# Patient Record
Sex: MALE | Race: WHITE | NOT HISPANIC OR LATINO | Employment: OTHER | URBAN - METROPOLITAN AREA
[De-identification: names, ages, dates, MRNs, and addresses within clinical notes are randomized per-mention and may not be internally consistent; named-entity substitution may affect disease eponyms.]

---

## 2017-06-14 ENCOUNTER — ALLSCRIPTS OFFICE VISIT (OUTPATIENT)
Dept: OTHER | Facility: OTHER | Age: 82
End: 2017-06-14

## 2018-01-14 VITALS
DIASTOLIC BLOOD PRESSURE: 66 MMHG | SYSTOLIC BLOOD PRESSURE: 110 MMHG | HEART RATE: 80 BPM | HEIGHT: 69 IN | WEIGHT: 231.5 LBS | BODY MASS INDEX: 34.29 KG/M2

## 2018-02-11 ENCOUNTER — APPOINTMENT (EMERGENCY)
Dept: RADIOLOGY | Facility: HOSPITAL | Age: 83
DRG: 682 | End: 2018-02-11
Payer: MEDICARE

## 2018-02-11 ENCOUNTER — HOSPITAL ENCOUNTER (INPATIENT)
Facility: HOSPITAL | Age: 83
LOS: 7 days | Discharge: HOME HEALTH | DRG: 682 | End: 2018-02-18
Attending: EMERGENCY MEDICINE | Admitting: HOSPITALIST
Payer: MEDICARE

## 2018-02-11 DIAGNOSIS — R21 RASH AND NONSPECIFIC SKIN ERUPTION: ICD-10-CM

## 2018-02-11 DIAGNOSIS — F32.A DEPRESSION, UNSPECIFIED DEPRESSION TYPE: ICD-10-CM

## 2018-02-11 DIAGNOSIS — I48.91 ATRIAL FIBRILLATION (HCC): Primary | ICD-10-CM

## 2018-02-11 DIAGNOSIS — R13.10 DYSPHAGIA: ICD-10-CM

## 2018-02-11 DIAGNOSIS — R13.10 DYSPHAGIA, UNSPECIFIED TYPE: ICD-10-CM

## 2018-02-11 DIAGNOSIS — I95.9 HYPOTENSION: ICD-10-CM

## 2018-02-11 DIAGNOSIS — I50.22 CHRONIC SYSTOLIC CHF (CONGESTIVE HEART FAILURE) (HCC): ICD-10-CM

## 2018-02-11 DIAGNOSIS — R21 RASH: ICD-10-CM

## 2018-02-11 DIAGNOSIS — N17.9 AKI (ACUTE KIDNEY INJURY) (HCC): ICD-10-CM

## 2018-02-11 DIAGNOSIS — T18.128A FOOD IMPACTION OF ESOPHAGUS, INITIAL ENCOUNTER: ICD-10-CM

## 2018-02-11 PROBLEM — R07.9 CHEST PAIN: Status: ACTIVE | Noted: 2018-02-11

## 2018-02-11 PROBLEM — C44.709: Status: ACTIVE | Noted: 2018-02-11

## 2018-02-11 PROBLEM — R17 SERUM TOTAL BILIRUBIN ELEVATED: Status: ACTIVE | Noted: 2018-02-11

## 2018-02-11 LAB
ALBUMIN SERPL BCP-MCNC: 3.6 G/DL (ref 3.5–5)
ALP SERPL-CCNC: 46 U/L (ref 46–116)
ALT SERPL W P-5'-P-CCNC: 34 U/L (ref 12–78)
ANION GAP SERPL CALCULATED.3IONS-SCNC: 13 MMOL/L (ref 4–13)
APTT PPP: 34 SECONDS (ref 23–35)
AST SERPL W P-5'-P-CCNC: 39 U/L (ref 5–45)
BASOPHILS # BLD AUTO: 0.02 THOUSANDS/ΜL (ref 0–0.1)
BASOPHILS NFR BLD AUTO: 0 % (ref 0–1)
BILIRUB SERPL-MCNC: 1.2 MG/DL (ref 0.2–1)
BUN SERPL-MCNC: 39 MG/DL (ref 5–25)
CALCIUM SERPL-MCNC: 9.3 MG/DL (ref 8.3–10.1)
CHLORIDE SERPL-SCNC: 94 MMOL/L (ref 100–108)
CO2 SERPL-SCNC: 29 MMOL/L (ref 21–32)
CREAT SERPL-MCNC: 3.78 MG/DL (ref 0.6–1.3)
DIGOXIN SERPL-MCNC: 1.1 NG/ML (ref 0.8–2)
EOSINOPHIL # BLD AUTO: 0.03 THOUSAND/ΜL (ref 0–0.61)
EOSINOPHIL NFR BLD AUTO: 0 % (ref 0–6)
ERYTHROCYTE [DISTWIDTH] IN BLOOD BY AUTOMATED COUNT: 14 % (ref 11.6–15.1)
GFR SERPL CREATININE-BSD FRML MDRD: 14 ML/MIN/1.73SQ M
GLUCOSE SERPL-MCNC: 111 MG/DL (ref 65–140)
HCT VFR BLD AUTO: 48.3 % (ref 36.5–49.3)
HGB BLD-MCNC: 15.7 G/DL (ref 12–17)
INR PPP: 1.28 (ref 0.86–1.16)
LYMPHOCYTES # BLD AUTO: 1.34 THOUSANDS/ΜL (ref 0.6–4.47)
LYMPHOCYTES NFR BLD AUTO: 13 % (ref 14–44)
MCH RBC QN AUTO: 30.8 PG (ref 26.8–34.3)
MCHC RBC AUTO-ENTMCNC: 32.5 G/DL (ref 31.4–37.4)
MCV RBC AUTO: 95 FL (ref 82–98)
MONOCYTES # BLD AUTO: 0.57 THOUSAND/ΜL (ref 0.17–1.22)
MONOCYTES NFR BLD AUTO: 6 % (ref 4–12)
NEUTROPHILS # BLD AUTO: 8.17 THOUSANDS/ΜL (ref 1.85–7.62)
NEUTS SEG NFR BLD AUTO: 81 % (ref 43–75)
NT-PROBNP SERPL-MCNC: 760 PG/ML
PLATELET # BLD AUTO: 292 THOUSANDS/UL (ref 149–390)
PMV BLD AUTO: 10.4 FL (ref 8.9–12.7)
POTASSIUM SERPL-SCNC: 4.5 MMOL/L (ref 3.5–5.3)
PROT SERPL-MCNC: 7.2 G/DL (ref 6.4–8.2)
PROTHROMBIN TIME: 16.4 SECONDS (ref 12.1–14.4)
RBC # BLD AUTO: 5.1 MILLION/UL (ref 3.88–5.62)
SODIUM SERPL-SCNC: 136 MMOL/L (ref 136–145)
TROPONIN I SERPL-MCNC: 0.05 NG/ML
TROPONIN I SERPL-MCNC: 0.06 NG/ML
WBC # BLD AUTO: 10.13 THOUSAND/UL (ref 4.31–10.16)

## 2018-02-11 PROCEDURE — 84484 ASSAY OF TROPONIN QUANT: CPT | Performed by: PHYSICIAN ASSISTANT

## 2018-02-11 PROCEDURE — 96360 HYDRATION IV INFUSION INIT: CPT

## 2018-02-11 PROCEDURE — 36415 COLL VENOUS BLD VENIPUNCTURE: CPT | Performed by: PHYSICIAN ASSISTANT

## 2018-02-11 PROCEDURE — 80162 ASSAY OF DIGOXIN TOTAL: CPT | Performed by: PHYSICIAN ASSISTANT

## 2018-02-11 PROCEDURE — 80053 COMPREHEN METABOLIC PANEL: CPT | Performed by: PHYSICIAN ASSISTANT

## 2018-02-11 PROCEDURE — 93005 ELECTROCARDIOGRAM TRACING: CPT

## 2018-02-11 PROCEDURE — 83880 ASSAY OF NATRIURETIC PEPTIDE: CPT | Performed by: PHYSICIAN ASSISTANT

## 2018-02-11 PROCEDURE — 71045 X-RAY EXAM CHEST 1 VIEW: CPT

## 2018-02-11 PROCEDURE — 96375 TX/PRO/DX INJ NEW DRUG ADDON: CPT

## 2018-02-11 PROCEDURE — 85025 COMPLETE CBC W/AUTO DIFF WBC: CPT | Performed by: PHYSICIAN ASSISTANT

## 2018-02-11 PROCEDURE — 85610 PROTHROMBIN TIME: CPT | Performed by: PHYSICIAN ASSISTANT

## 2018-02-11 PROCEDURE — 96366 THER/PROPH/DIAG IV INF ADDON: CPT

## 2018-02-11 PROCEDURE — 99285 EMERGENCY DEPT VISIT HI MDM: CPT

## 2018-02-11 PROCEDURE — 85730 THROMBOPLASTIN TIME PARTIAL: CPT | Performed by: PHYSICIAN ASSISTANT

## 2018-02-11 PROCEDURE — 96365 THER/PROPH/DIAG IV INF INIT: CPT

## 2018-02-11 PROCEDURE — 99223 1ST HOSP IP/OBS HIGH 75: CPT | Performed by: PHYSICIAN ASSISTANT

## 2018-02-11 RX ORDER — SODIUM CHLORIDE 9 MG/ML
125 INJECTION, SOLUTION INTRAVENOUS CONTINUOUS
Status: DISCONTINUED | OUTPATIENT
Start: 2018-02-11 | End: 2018-02-11

## 2018-02-11 RX ORDER — LORATADINE 10 MG/1
10 TABLET ORAL DAILY
Status: DISCONTINUED | OUTPATIENT
Start: 2018-02-12 | End: 2018-02-18 | Stop reason: HOSPADM

## 2018-02-11 RX ORDER — ALBUTEROL SULFATE 90 UG/1
2 AEROSOL, METERED RESPIRATORY (INHALATION) EVERY 4 HOURS PRN
Status: DISCONTINUED | OUTPATIENT
Start: 2018-02-11 | End: 2018-02-18 | Stop reason: HOSPADM

## 2018-02-11 RX ORDER — LORATADINE 10 MG/1
TABLET ORAL DAILY PRN
COMMUNITY

## 2018-02-11 RX ORDER — DILTIAZEM HYDROCHLORIDE 5 MG/ML
INJECTION INTRAVENOUS
Status: DISCONTINUED
Start: 2018-02-11 | End: 2018-02-11 | Stop reason: WASHOUT

## 2018-02-11 RX ORDER — METOPROLOL TARTRATE 50 MG/1
50 TABLET, FILM COATED ORAL EVERY 12 HOURS SCHEDULED
Status: ON HOLD | COMMUNITY
End: 2018-02-18

## 2018-02-11 RX ORDER — DIPHENHYDRAMINE HYDROCHLORIDE 50 MG/ML
25 INJECTION INTRAMUSCULAR; INTRAVENOUS ONCE
Status: COMPLETED | OUTPATIENT
Start: 2018-02-11 | End: 2018-02-11

## 2018-02-11 RX ORDER — ACETAMINOPHEN 325 MG/1
650 TABLET ORAL EVERY 4 HOURS PRN
Status: DISCONTINUED | OUTPATIENT
Start: 2018-02-11 | End: 2018-02-18 | Stop reason: HOSPADM

## 2018-02-11 RX ORDER — METOPROLOL SUCCINATE 50 MG/1
1 TABLET, EXTENDED RELEASE ORAL 2 TIMES DAILY
COMMUNITY
Start: 2015-06-25 | End: 2018-02-11

## 2018-02-11 RX ORDER — CITALOPRAM 20 MG/1
10 TABLET ORAL DAILY
Status: DISCONTINUED | OUTPATIENT
Start: 2018-02-12 | End: 2018-02-18

## 2018-02-11 RX ORDER — FUROSEMIDE 40 MG/1
TABLET ORAL 2 TIMES DAILY
COMMUNITY
Start: 2017-05-02 | End: 2018-02-18 | Stop reason: HOSPADM

## 2018-02-11 RX ORDER — DIGOXIN 125 MCG
1 TABLET ORAL DAILY
COMMUNITY
Start: 2015-07-02 | End: 2018-02-18 | Stop reason: HOSPADM

## 2018-02-11 RX ORDER — ASPIRIN 81 MG/1
81 TABLET ORAL DAILY
COMMUNITY

## 2018-02-11 RX ORDER — METOPROLOL TARTRATE 5 MG/5ML
5 INJECTION INTRAVENOUS ONCE
Status: COMPLETED | OUTPATIENT
Start: 2018-02-11 | End: 2018-02-11

## 2018-02-11 RX ORDER — ALBUTEROL SULFATE 90 UG/1
AEROSOL, METERED RESPIRATORY (INHALATION)
COMMUNITY
Start: 2015-04-14 | End: 2018-02-26 | Stop reason: ALTCHOICE

## 2018-02-11 RX ORDER — LISINOPRIL 2.5 MG/1
TABLET ORAL
COMMUNITY
Start: 2015-06-17 | End: 2018-02-18 | Stop reason: HOSPADM

## 2018-02-11 RX ORDER — SODIUM CHLORIDE 9 MG/ML
75 INJECTION, SOLUTION INTRAVENOUS CONTINUOUS
Status: CANCELLED | OUTPATIENT
Start: 2018-02-11

## 2018-02-11 RX ORDER — ONDANSETRON 2 MG/ML
4 INJECTION INTRAMUSCULAR; INTRAVENOUS EVERY 6 HOURS PRN
Status: DISCONTINUED | OUTPATIENT
Start: 2018-02-11 | End: 2018-02-18 | Stop reason: HOSPADM

## 2018-02-11 RX ORDER — ASPIRIN 81 MG/1
81 TABLET ORAL DAILY
Status: DISCONTINUED | OUTPATIENT
Start: 2018-02-12 | End: 2018-02-18 | Stop reason: HOSPADM

## 2018-02-11 RX ORDER — SODIUM CHLORIDE 9 MG/ML
125 INJECTION, SOLUTION INTRAVENOUS CONTINUOUS
Status: CANCELLED | OUTPATIENT
Start: 2018-02-11

## 2018-02-11 RX ORDER — CALCIUM CARBONATE 200(500)MG
1000 TABLET,CHEWABLE ORAL DAILY PRN
Status: DISCONTINUED | OUTPATIENT
Start: 2018-02-11 | End: 2018-02-18 | Stop reason: HOSPADM

## 2018-02-11 RX ORDER — METOPROLOL TARTRATE 50 MG/1
50 TABLET, FILM COATED ORAL EVERY 12 HOURS SCHEDULED
Status: DISCONTINUED | OUTPATIENT
Start: 2018-02-11 | End: 2018-02-12

## 2018-02-11 RX ORDER — CITALOPRAM 10 MG/1
TABLET ORAL
COMMUNITY
Start: 2015-07-15 | End: 2018-02-18 | Stop reason: HOSPADM

## 2018-02-11 RX ORDER — DIGOXIN 125 MCG
125 TABLET ORAL DAILY
Status: DISCONTINUED | OUTPATIENT
Start: 2018-02-12 | End: 2018-02-12

## 2018-02-11 RX ORDER — OXYBUTYNIN CHLORIDE 5 MG/1
1 TABLET ORAL 2 TIMES DAILY
COMMUNITY
End: 2019-05-02

## 2018-02-11 RX ORDER — OXYBUTYNIN CHLORIDE 5 MG/1
5 TABLET ORAL 2 TIMES DAILY
Status: DISCONTINUED | OUTPATIENT
Start: 2018-02-12 | End: 2018-02-18 | Stop reason: HOSPADM

## 2018-02-11 RX ORDER — FUROSEMIDE 10 MG/ML
40 INJECTION INTRAMUSCULAR; INTRAVENOUS ONCE
Status: COMPLETED | OUTPATIENT
Start: 2018-02-11 | End: 2018-02-11

## 2018-02-11 RX ORDER — SENNOSIDES 8.6 MG
1 TABLET ORAL DAILY
Status: DISCONTINUED | OUTPATIENT
Start: 2018-02-12 | End: 2018-02-18 | Stop reason: HOSPADM

## 2018-02-11 RX ADMIN — FUROSEMIDE 40 MG: 10 INJECTION, SOLUTION INTRAMUSCULAR; INTRAVENOUS at 23:35

## 2018-02-11 RX ADMIN — DIPHENHYDRAMINE HYDROCHLORIDE 25 MG: 50 INJECTION, SOLUTION INTRAMUSCULAR; INTRAVENOUS at 19:11

## 2018-02-11 RX ADMIN — SODIUM CHLORIDE 125 ML/HR: 0.9 INJECTION, SOLUTION INTRAVENOUS at 19:50

## 2018-02-11 RX ADMIN — METOPROLOL TARTRATE 5 MG: 1 INJECTION, SOLUTION INTRAVENOUS at 18:00

## 2018-02-11 RX ADMIN — DILTIAZEM HYDROCHLORIDE 5 MG/HR: 5 INJECTION INTRAVENOUS at 18:30

## 2018-02-11 NOTE — ED PROVIDER NOTES
History  Chief Complaint   Patient presents with    Rash     pts daughter reports pt was vomiting today and "itchy all over " pt has generalized rash  states c/p last night  pt reports generalized body aches  denies any fevers  80-year-old male with past medical history of atrial fibrillation (on metoprolol and Eliquis), hypertension, congestive heart failure, presents to emergency department for chest pain and rash  Patient states that the chest discomfort started in his mid substernal chest yesterday while he was at rest, subsiding after a few minutes  Denies any palpitations, shortness of breath, coughing, increased leg swelling  Denies any current chest pain  Denies fevers, chills, nausea, vomiting, abdominal pain  Also developed a rash to the bilateral upper extremities and anterior and posterior trunk  Endorses hives to the anterior trunk that are itchy  Did not take anything for this prior to arrival  Denies new detergents, foods, lotions, environments, medications  History provided by:  Patient and relative   used: No    Rash   Associated symptoms: no abdominal pain, no diarrhea, no fatigue, no fever, no headaches, no joint pain, no myalgias, no nausea, no shortness of breath, no sore throat, not vomiting and not wheezing        Prior to Admission Medications   Prescriptions Last Dose Informant Patient Reported? Taking?    NON FORMULARY   Yes Yes   albuterol (PROAIR HFA) 90 mcg/act inhaler   Yes Yes   Sig: Inhale   apixaban (ELIQUIS) 5 mg   Yes Yes   Sig: Take 1 tablet by mouth 2 (two) times a day   aspirin (ECOTRIN LOW STRENGTH) 81 mg EC tablet   Yes Yes   Sig: Take by mouth   citalopram (CeleXA) 10 mg tablet   Yes Yes   Sig: Take by mouth   digoxin (LANOXIN) 0 125 mg tablet   Yes Yes   Sig: Take 1 tablet by mouth daily   furosemide (LASIX) 40 mg tablet   Yes Yes   Sig: Take by mouth 2 (two) times a day     lisinopril (ZESTRIL) 2 5 mg tablet   Yes Yes   Sig: Take by mouth   loratadine (CLARITIN) 10 mg tablet   Yes Yes   Sig: Take by mouth   metoprolol tartrate (LOPRESSOR) 50 mg tablet   Yes Yes   Sig: Take 50 mg by mouth every 12 (twelve) hours   oxybutynin (DITROPAN) 5 mg tablet   Yes Yes   Sig: Take 1 tablet by mouth 2 (two) times a day      Facility-Administered Medications: None       Past Medical History:   Diagnosis Date    Atrial fibrillation (HCC)     Hypertension     Macular degeneration        History reviewed  No pertinent surgical history  History reviewed  No pertinent family history  I have reviewed and agree with the history as documented  Social History   Substance Use Topics    Smoking status: Never Smoker    Smokeless tobacco: Not on file    Alcohol use No        Review of Systems   Constitutional: Negative for chills, diaphoresis, fatigue and fever  HENT: Negative for congestion, ear pain, rhinorrhea and sore throat  Respiratory: Positive for chest tightness  Negative for cough, choking, shortness of breath, wheezing and stridor  Cardiovascular: Positive for chest pain  Negative for palpitations and leg swelling  Gastrointestinal: Negative for abdominal pain, diarrhea, nausea and vomiting  Genitourinary: Negative for difficulty urinating, dysuria, flank pain, frequency and urgency  Musculoskeletal: Negative for arthralgias, back pain, gait problem, joint swelling, myalgias, neck pain and neck stiffness  Skin: Positive for rash  Negative for color change, pallor and wound  Neurological: Negative for dizziness, weakness, light-headedness, numbness and headaches         Physical Exam  ED Triage Vitals   Temperature Pulse Respirations Blood Pressure SpO2   02/11/18 2243 02/11/18 1728 02/11/18 1728 02/11/18 1728 02/11/18 1823   98 4 °F (36 9 °C) (!) 149 16 125/73 95 %      Temp Source Heart Rate Source Patient Position - Orthostatic VS BP Location FiO2 (%)   02/11/18 1728 02/11/18 1728 02/11/18 1823 02/11/18 1823 --   Oral Monitor Lying Left arm       Pain Score       02/11/18 1728       No Pain           Orthostatic Vital Signs  Vitals:    02/11/18 2047 02/11/18 2100 02/11/18 2145 02/11/18 2243   BP: (!) 108/44 109/54 111/51 120/78   Pulse: (!) 115 (!) 116 98 75   Patient Position - Orthostatic VS: Sitting Sitting Sitting Lying       Physical Exam   Constitutional: He is oriented to person, place, and time  He appears well-developed and well-nourished  Eyes: Conjunctivae and EOM are normal  Pupils are equal, round, and reactive to light  Neck: Normal range of motion  Neck supple  Cardiovascular: Intact distal pulses  Irregularly irregular, tachycardia  Pulmonary/Chest: Effort normal and breath sounds normal  He has no wheezes  He has no rales  Abdominal: Soft  Bowel sounds are normal  He exhibits no distension  There is no tenderness  There is no rebound and no guarding  Musculoskeletal: Normal range of motion  He exhibits no edema or tenderness  Neurological: He is alert and oriented to person, place, and time  No cranial nerve deficit or sensory deficit  He exhibits normal muscle tone  Coordination normal    Skin: Skin is warm and dry  Capillary refill takes less than 2 seconds  Rash (Diffuse to bilateral upper extremities and anterior trunk and posterior trunk  Erythematous hives  Dry skin with overlying excoriations ) noted  Psychiatric: He has a normal mood and affect  His behavior is normal    Nursing note and vitals reviewed        ED Medications  Medications   diltiazem (CARDIZEM) 125 mg in sodium chloride 0 9 % 125 mL infusion (5 mg/hr Intravenous Rate/Dose Change 2/11/18 2300)   aspirin (ECOTRIN LOW STRENGTH) EC tablet 81 mg (not administered)   citalopram (CeleXA) tablet 10 mg (not administered)   digoxin (LANOXIN) tablet 125 mcg (not administered)   apixaban (ELIQUIS) tablet 2 5 mg (not administered)   loratadine (CLARITIN) tablet 10 mg (not administered)   oxybutynin (DITROPAN) tablet 5 mg (not administered) albuterol (PROVENTIL HFA,VENTOLIN HFA) inhaler 2 puff (not administered)   metoprolol tartrate (LOPRESSOR) tablet 50 mg (not administered)   senna (SENOKOT) tablet 8 6 mg (not administered)   ondansetron (ZOFRAN) injection 4 mg (not administered)   calcium carbonate (TUMS) chewable tablet 1,000 mg (not administered)   acetaminophen (TYLENOL) tablet 650 mg (not administered)   furosemide (LASIX) injection 40 mg (not administered)   metoprolol (LOPRESSOR) injection 5 mg (5 mg Intravenous Given 2/11/18 1800)   diphenhydrAMINE (BENADRYL) injection 25 mg (25 mg Intravenous Given 2/11/18 1911)       Diagnostic Studies  Results Reviewed     Procedure Component Value Units Date/Time    Digoxin level [[id number]]  (Normal) Collected:  02/11/18 1806    Lab Status:  Final result Specimen:  Blood from Arm, Right Updated:  02/11/18 2229     Digoxin Lvl 1 1 ng/mL     Troponin I [[id number]]  (Abnormal) Collected:  02/11/18 2109    Lab Status:  Final result Specimen:  Blood from Arm, Left Updated:  02/11/18 2135     Troponin I 0 06 (H) ng/mL     Narrative:         [employer] Chemistry analyzer 99% cutoff is > 0 04 ng/mL in network labs    o cTnI 99% cutoff is useful only when applied to patients in the clinical setting of myocardial ischemia  o cTnI 99% cutoff should be interpreted in the context of clinical history, ECG findings and possibly cardiac imaging to establish correct diagnosis  o cTnI 99% cutoff may be suggestive but clearly not indicative of a coronary event without the clinical setting of myocardial ischemia      B-type natriuretic peptide [[id number]]  (Abnormal) Collected:  02/11/18 1806    Lab Status:  Final result Specimen:  Blood from Arm, Right Updated:  02/11/18 1843     NT-proBNP 760 (H) pg/mL     Comprehensive metabolic panel [[id number]]  (Abnormal) Collected:  02/11/18 1806    Lab Status:  Final result Specimen:  Blood from Arm, Right Updated:  02/11/18 1837     Sodium 136 mmol/L      Potassium 4 5 mmol/L Chloride 94 (L) mmol/L      CO2 29 mmol/L      Anion Gap 13 mmol/L      BUN 39 (H) mg/dL      Creatinine 3 78 (H) mg/dL      Glucose 111 mg/dL      Calcium 9 3 mg/dL      AST 39 U/L      ALT 34 U/L      Alkaline Phosphatase 46 U/L      Total Protein 7 2 g/dL      Albumin 3 6 g/dL      Total Bilirubin 1 20 (H) mg/dL      eGFR 14 ml/min/1 73sq m     Narrative:         National Kidney Disease Education Program recommendations are as follows:  GFR calculation is accurate only with a steady state creatinine  Chronic Kidney disease less than 60 ml/min/1 73 sq  meters  Kidney failure less than 15 ml/min/1 73 sq  meters  Troponin I [[id number]]  (Abnormal) Collected:  02/11/18 1806    Lab Status:  Final result Specimen:  Blood from Arm, Right Updated:  02/11/18 1837     Troponin I 0 05 (H) ng/mL     Narrative:         [employer] Chemistry analyzer 99% cutoff is > 0 04 ng/mL in network labs    o cTnI 99% cutoff is useful only when applied to patients in the clinical setting of myocardial ischemia  o cTnI 99% cutoff should be interpreted in the context of clinical history, ECG findings and possibly cardiac imaging to establish correct diagnosis  o cTnI 99% cutoff may be suggestive but clearly not indicative of a coronary event without the clinical setting of myocardial ischemia  Protime-INR [[id number]]  (Abnormal) Collected:  02/11/18 1806    Lab Status:  Final result Specimen:  Blood from Arm, Right Updated:  02/11/18 1831     Protime 16 4 (H) seconds      INR 1 28 (H)    APTT [[id number]]  (Normal) Collected:  02/11/18 1806    Lab Status:  Final result Specimen:  Blood from Arm, Right Updated:  02/11/18 1831     PTT 34 seconds     Narrative:          Therapeutic Heparin Range = 60-90 seconds    CBC and differential [[id number]]  (Abnormal) Collected:  02/11/18 1806    Lab Status:  Final result Specimen:  Blood from Arm, Right Updated:  02/11/18 1819     WBC 10 13 Thousand/uL      RBC 5 10 Million/uL      Hemoglobin 15 7 g/dL Hematocrit 48 3 %      MCV 95 fL      MCH 30 8 pg      MCHC 32 5 g/dL      RDW 14 0 %      MPV 10 4 fL      Platelets 471 Thousands/uL      Neutrophils Relative 81 (H) %      Lymphocytes Relative 13 (L) %      Monocytes Relative 6 %      Eosinophils Relative 0 %      Basophils Relative 0 %      Neutrophils Absolute 8 17 (H) Thousands/µL      Lymphocytes Absolute 1 34 Thousands/µL      Monocytes Absolute 0 57 Thousand/µL      Eosinophils Absolute 0 03 Thousand/µL      Basophils Absolute 0 02 Thousands/µL                  XR chest 1 view portable    (Results Pending)              Procedures  ECG 12 Lead Documentation  Date/Time: 2/11/2018 6:24 PM  Performed by: Rebecca Odonnell  Authorized by: Dave Ryan     Indications / Diagnosis:  Chest pain  ECG reviewed by me, the ED Provider: no    Patient location:  ED  Previous ECG:     Previous ECG:  Compared to current    Comparison ECG info:  6/16/15    Similarity:  Changes noted  Interpretation:     Interpretation: abnormal    Rate:     ECG rate:  140    ECG rate assessment: tachycardic    Rhythm:     Rhythm comment:  A-Fib in RVR  Ectopy:     Ectopy: none    QRS:     QRS axis:  Left    QRS intervals:  Normal  Conduction:     Conduction: normal    ST segments:     ST segments:  Normal  T waves:     T waves: normal               Phone Contacts  ED Phone Contact    ED Course  ED Course as of Feb 12 0025   Sun Feb 11, 2018   1858 Troponin I: (!) 0 05         HEART Risk Score    Flowsheet Row Most Recent Value   History  0 Filed at: 02/12/2018 0023   ECG  1 Filed at: 02/12/2018 0023   Age  2 Filed at: 02/12/2018 0023   Risk Factors  2 Filed at: 02/12/2018 0023   Troponin  1 Filed at: 02/12/2018 0023   Heart Score Risk Calculator   History  0 Filed at: 02/12/2018 0023   ECG  1 Filed at: 02/12/2018 0023   Age  2 Filed at: 02/12/2018 0023   Risk Factors  2 Filed at: 02/12/2018 0023   Troponin  1 Filed at: 02/12/2018 0023   HEART Score  6 Filed at: 02/12/2018 0023   HEART Score  6 Filed at: 02/12/2018 0023                            MDM  Number of Diagnoses or Management Options  JOHN (acute kidney injury) [facility]):   Atrial fibrillation ([facility] [address] ):   Hypotension:   Rash and nonspecific skin eruption:   Diagnosis management comments: 70-year-old male with past medical history of atrial fibrillation (on metoprolol and Eliquis), hypertension, congestive heart failure, presents to emergency department for chest pain and rash  Patient found to be in A-fib with RVR  Started on a cardizem drip  Trop 0 05  CXR Congestion  Labs show new JOHN 3 78    Given benadryl for rash  Started on Diltiazem drip 5mg/hr due to hypotension  Given 1L NS slowly with improvement of blood pressure, Lungs have been reevaluated during these times and has improved as well  Have slowly increased dilt drip to 7 5 mg/hr with improvement of HR to 90s-110s  Patient is already anticoagulated on Eliquis  Admit for A-fib with RVR, rash, JOHN  Amount and/or Complexity of Data Reviewed  Clinical lab tests: ordered and reviewed  Tests in the radiology section of CPT®: ordered and reviewed  Independent visualization of images, tracings, or specimens: yes      The patient presented with a condition in which there was a high probability of imminent or life-threatening deterioration, and critical care services (excluding separately billable procedures) totalled 30-74 minutes          Disposition  Final diagnoses:   Atrial fibrillation ([facility] [address] ) - in RVR   Rash and nonspecific skin eruption   Hypotension   JOHN (acute kidney injury) ([facility] [address] )     Time reflects when diagnosis was documented in both MDM as applicable and the Disposition within this note     Time User Action Codes Description Comment    2/11/2018  8:47 PM Mattie Gallus Add [I48 91] Atrial fibrillation ([facility] [address] )     2/11/2018  8:47 PM Mattie Gallus Modify [I48 91] Atrial fibrillation ([facility] [address] ) in RVR    2/11/2018  8:47 PM Sera Bernabe Add [R21] Rash and nonspecific skin eruption 2/11/2018  8:47 PM Eliane Bernabe Add [I95 9] Hypotension     2/11/2018  8:47 PM Eliane Bernabe Add [N17 9] JOHN (acute kidney injury) [facility])       ED Disposition     ED Disposition Condition Comment    Admit  Case was discussed with SLIM PA and the patient's admission status was agreed to be Admission Status: inpatient status to the service of Dr Sarbjit Rosales   Follow-up Information    None       Current Discharge Medication List      CONTINUE these medications which have NOT CHANGED    Details   albuterol (PROAIR HFA) 90 mcg/act inhaler Inhale      apixaban (ELIQUIS) 5 mg Take 1 tablet by mouth 2 (two) times a day      aspirin (ECOTRIN LOW STRENGTH) 81 mg EC tablet Take by mouth      citalopram (CeleXA) 10 mg tablet Take by mouth      digoxin (LANOXIN) 0 125 mg tablet Take 1 tablet by mouth daily      furosemide (LASIX) 40 mg tablet Take by mouth 2 (two) times a day        lisinopril (ZESTRIL) 2 5 mg tablet Take by mouth      loratadine (CLARITIN) 10 mg tablet Take by mouth      metoprolol tartrate (LOPRESSOR) 50 mg tablet Take 50 mg by mouth every 12 (twelve) hours      NON FORMULARY       oxybutynin (DITROPAN) 5 mg tablet Take 1 tablet by mouth 2 (two) times a day           No discharge procedures on file      ED Provider  Electronically Signed by           Lucas Mena PA-C  02/12/18 0025

## 2018-02-11 NOTE — ED NOTES
Blood pressures cycling q 5 minutes  Patient remains on continuous cardiac and oxygenation monitoring       Kemi Costa RN  02/11/18 7880

## 2018-02-12 ENCOUNTER — APPOINTMENT (INPATIENT)
Dept: ULTRASOUND IMAGING | Facility: HOSPITAL | Age: 83
DRG: 682 | End: 2018-02-12
Payer: MEDICARE

## 2018-02-12 ENCOUNTER — TELEPHONE (OUTPATIENT)
Dept: CARDIOLOGY CLINIC | Facility: CLINIC | Age: 83
End: 2018-02-12

## 2018-02-12 PROBLEM — R21 RASH: Status: ACTIVE | Noted: 2018-02-12

## 2018-02-12 PROBLEM — F32.A DEPRESSION: Status: ACTIVE | Noted: 2018-02-12

## 2018-02-12 LAB
ANION GAP SERPL CALCULATED.3IONS-SCNC: 15 MMOL/L (ref 4–13)
APTT PPP: 35 SECONDS (ref 23–35)
ATRIAL RATE: 192 BPM
ATRIAL RATE: 326 BPM
BUN SERPL-MCNC: 51 MG/DL (ref 5–25)
CALCIUM SERPL-MCNC: 8.4 MG/DL (ref 8.3–10.1)
CHLORIDE SERPL-SCNC: 95 MMOL/L (ref 100–108)
CO2 SERPL-SCNC: 25 MMOL/L (ref 21–32)
CREAT SERPL-MCNC: 4.58 MG/DL (ref 0.6–1.3)
ERYTHROCYTE [DISTWIDTH] IN BLOOD BY AUTOMATED COUNT: 14 % (ref 11.6–15.1)
GFR SERPL CREATININE-BSD FRML MDRD: 11 ML/MIN/1.73SQ M
GLUCOSE SERPL-MCNC: 119 MG/DL (ref 65–140)
HCT VFR BLD AUTO: 44.6 % (ref 36.5–49.3)
HGB BLD-MCNC: 14.6 G/DL (ref 12–17)
INR PPP: 1.48 (ref 0.86–1.16)
MAGNESIUM SERPL-MCNC: 1.9 MG/DL (ref 1.6–2.6)
MCH RBC QN AUTO: 31.1 PG (ref 26.8–34.3)
MCHC RBC AUTO-ENTMCNC: 32.7 G/DL (ref 31.4–37.4)
MCV RBC AUTO: 95 FL (ref 82–98)
P AXIS: 171 DEGREES
PLATELET # BLD AUTO: 285 THOUSANDS/UL (ref 149–390)
PMV BLD AUTO: 10.8 FL (ref 8.9–12.7)
POTASSIUM SERPL-SCNC: 5 MMOL/L (ref 3.5–5.3)
PROTHROMBIN TIME: 18.4 SECONDS (ref 12.1–14.4)
QRS AXIS: -23 DEGREES
QRS AXIS: -25 DEGREES
QRSD INTERVAL: 88 MS
QRSD INTERVAL: 90 MS
QT INTERVAL: 282 MS
QT INTERVAL: 292 MS
QTC INTERVAL: 397 MS
QTC INTERVAL: 430 MS
RBC # BLD AUTO: 4.69 MILLION/UL (ref 3.88–5.62)
SODIUM SERPL-SCNC: 135 MMOL/L (ref 136–145)
T WAVE AXIS: 135 DEGREES
T WAVE AXIS: 148 DEGREES
TROPONIN I SERPL-MCNC: 0.06 NG/ML
VENTRICULAR RATE: 111 BPM
VENTRICULAR RATE: 140 BPM
WBC # BLD AUTO: 8.61 THOUSAND/UL (ref 4.31–10.16)

## 2018-02-12 PROCEDURE — 80048 BASIC METABOLIC PNL TOTAL CA: CPT | Performed by: PHYSICIAN ASSISTANT

## 2018-02-12 PROCEDURE — 85027 COMPLETE CBC AUTOMATED: CPT | Performed by: PHYSICIAN ASSISTANT

## 2018-02-12 PROCEDURE — 99233 SBSQ HOSP IP/OBS HIGH 50: CPT | Performed by: INTERNAL MEDICINE

## 2018-02-12 PROCEDURE — 76770 US EXAM ABDO BACK WALL COMP: CPT

## 2018-02-12 PROCEDURE — 83735 ASSAY OF MAGNESIUM: CPT | Performed by: PHYSICIAN ASSISTANT

## 2018-02-12 PROCEDURE — 85610 PROTHROMBIN TIME: CPT | Performed by: PHYSICIAN ASSISTANT

## 2018-02-12 PROCEDURE — 84484 ASSAY OF TROPONIN QUANT: CPT | Performed by: PHYSICIAN ASSISTANT

## 2018-02-12 PROCEDURE — 85730 THROMBOPLASTIN TIME PARTIAL: CPT | Performed by: PHYSICIAN ASSISTANT

## 2018-02-12 PROCEDURE — 93010 ELECTROCARDIOGRAM REPORT: CPT | Performed by: INTERNAL MEDICINE

## 2018-02-12 PROCEDURE — 99221 1ST HOSP IP/OBS SF/LOW 40: CPT | Performed by: NURSE PRACTITIONER

## 2018-02-12 PROCEDURE — 99222 1ST HOSP IP/OBS MODERATE 55: CPT | Performed by: INTERNAL MEDICINE

## 2018-02-12 RX ORDER — FUROSEMIDE 10 MG/ML
40 INJECTION INTRAMUSCULAR; INTRAVENOUS DAILY
Status: DISCONTINUED | OUTPATIENT
Start: 2018-02-12 | End: 2018-02-12

## 2018-02-12 RX ADMIN — ASPIRIN 81 MG: 81 TABLET, COATED ORAL at 08:56

## 2018-02-12 RX ADMIN — APIXABAN 2.5 MG: 2.5 TABLET, FILM COATED ORAL at 18:10

## 2018-02-12 RX ADMIN — METOPROLOL TARTRATE 50 MG: 50 TABLET ORAL at 08:56

## 2018-02-12 RX ADMIN — SENNOSIDES 8.6 MG: 8.6 TABLET, FILM COATED ORAL at 08:56

## 2018-02-12 RX ADMIN — LORATADINE 10 MG: 10 TABLET ORAL at 08:56

## 2018-02-12 RX ADMIN — ACETAMINOPHEN 650 MG: 325 TABLET, FILM COATED ORAL at 21:16

## 2018-02-12 RX ADMIN — HYDROCORTISONE: 25 CREAM TOPICAL at 12:59

## 2018-02-12 RX ADMIN — OXYBUTYNIN CHLORIDE 5 MG: 5 TABLET ORAL at 18:10

## 2018-02-12 RX ADMIN — OXYBUTYNIN CHLORIDE 5 MG: 5 TABLET ORAL at 08:56

## 2018-02-12 RX ADMIN — METOPROLOL TARTRATE 75 MG: 50 TABLET ORAL at 20:24

## 2018-02-12 RX ADMIN — DIGOXIN 125 MCG: 125 TABLET ORAL at 08:56

## 2018-02-12 RX ADMIN — FUROSEMIDE 40 MG: 10 INJECTION, SOLUTION INTRAMUSCULAR; INTRAVENOUS at 11:01

## 2018-02-12 RX ADMIN — APIXABAN 2.5 MG: 2.5 TABLET, FILM COATED ORAL at 08:56

## 2018-02-12 RX ADMIN — CITALOPRAM HYDROBROMIDE 10 MG: 20 TABLET ORAL at 08:55

## 2018-02-12 RX ADMIN — ONDANSETRON 4 MG: 2 INJECTION INTRAMUSCULAR; INTRAVENOUS at 21:16

## 2018-02-12 RX ADMIN — HYDROCORTISONE: 25 CREAM TOPICAL at 15:45

## 2018-02-12 RX ADMIN — SODIUM CHLORIDE 500 ML: 0.9 INJECTION, SOLUTION INTRAVENOUS at 07:44

## 2018-02-12 NOTE — ED NOTES
Two attempts to place second peripheral line unsuccessful  Consulting another nurse now       Hank Gottron, BRANDON  02/11/18 1921

## 2018-02-12 NOTE — ASSESSMENT & PLAN NOTE
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

## 2018-02-12 NOTE — ED NOTES
Titrate cardizem to 7 5 mg/hr per [facility] now       Gonsalo Akers, BRANDON  02/11/18 1946

## 2018-02-12 NOTE — ED NOTES
Patient placed on 2L NC for comfort  Denies SOB  Neg signs of distress  Pt resting comfortably on stretcher  Lights dimmed, pillows provided for head and positioning  Call bell within reach  Blood pressure continues to cycle q 5 minutes        Joanne Mccarty RN  02/11/18 [id number]

## 2018-02-12 NOTE — ED NOTES
Provider aware of patient's current VS  Cardizem to remain at 5 mg/hr until blood pressure improves  Cardizem dose to be re-evaluated at [address] [facility]       Kortney Yuan, BRANDON  02/11/18 6835

## 2018-02-12 NOTE — CONSULTS
Consultation - General Cardiology   Peggy Garzon 80 y o  male MRN: [id number]  Unit/Bed#: -01 Encounter: [id number]    Inpatient consult to Cardiology  Consult performed by: Carlos Chong ordered by: Rosita Souza        Chief Complaint   Patient presents with    Rash       pts daughter reports pt was vomiting today and "itchy all over " pt has generalized rash  states c/p last night  pt reports generalized body aches  denies any fevers            Physician Requesting Consult: Tomasz Verduzco MD  Reason for Consult / Principal Problem:  A fib    History of Present Illness   HPI: Peggy Garzon is a 80y o  year old male who has a history of nonischemic cardiomyopathy, (ejection fraction 50% improved from 20% in 4161), chronic systolic heart failure, hypertension and atrial fibrillation  He has been rate controlled with Toprol and digoxin, and anticoagulated with Eliquis  His cardiomyopathy appears to have been rate related  He follows with cardiologist Dr Anatoly Hester  He was brought to the hospital yesterday by family with complaint of chest pains and a generalized body rash  His chest pain was intermittent , nonradiating and without neck jaw or arm pain  He denied palpitations or dyspnea  No cough, fever chills  He did vomit once  He is found to be in rapid AFib with heart rates 140 to 150  His presenting blood pressure was in the 120s  His creatinine was 3 78, BUN 39  Prior labs in 2016 showed a creatinine of 1 1  Trop  05 Chest x-ray:  No pleural effusion  Clear lungs  He was given metoprolol, Cardizem, fluids and Benadryl  His BP then dipped into the low 80s  Pharmacological stress test June 2015:  Ejection fraction 21%  There was no definite myocardial ischemia or fixed perfusion defect  There was severe global hypokinesis    Review of Systems   Constitution: Positive for weakness and malaise/fatigue  Negative for chills and fever     Cardiovascular: Negative for claudication, cyanosis, dyspnea on exertion, irregular heartbeat, leg swelling, near-syncope, orthopnea, palpitations, paroxysmal nocturnal dyspnea and syncope  See HPI   Respiratory: Positive for shortness of breath  Negative for cough  Gastrointestinal: Negative for heartburn  Neurological: Negative for dizziness, focal weakness and headaches  All other systems reviewed and are negative  Historical Information   Past Medical History:   Diagnosis Date    Atrial fibrillation ([address] )     Hypertension     Macular degeneration      History reviewed  No pertinent surgical history  History   Alcohol Use No     History   Drug Use No     History   Smoking Status    Never Smoker   Smokeless Tobacco    Not on file     Family History: History reviewed  No pertinent family history      Meds/Allergies   all current active meds have been reviewed, current meds:   Current Facility-Administered Medications   Medication Dose Route Frequency    acetaminophen (TYLENOL) tablet 650 mg  650 mg Oral Q4H PRN    albuterol (PROVENTIL HFA,VENTOLIN HFA) inhaler 2 puff  2 puff Inhalation Q4H PRN    apixaban (ELIQUIS) tablet 2 5 mg  2 5 mg Oral BID    aspirin (ECOTRIN LOW STRENGTH) EC tablet 81 mg  81 mg Oral Daily    calcium carbonate (TUMS) chewable tablet 1,000 mg  1,000 mg Oral Daily PRN    citalopram (CeleXA) tablet 10 mg  10 mg Oral Daily    digoxin (LANOXIN) tablet 125 mcg  125 mcg Oral Daily    diltiazem (CARDIZEM) 125 mg in sodium chloride 0 9 % 125 mL infusion  1-15 mg/hr Intravenous Titrated    furosemide (LASIX) injection 40 mg  40 mg Intravenous Daily    hydrocortisone 2 5 % cream   Topical 4x Daily PRN    loratadine (CLARITIN) tablet 10 mg  10 mg Oral Daily    metoprolol tartrate (LOPRESSOR) tablet 50 mg  50 mg Oral Q12H [address]    ondansetron (ZOFRAN) injection 4 mg  4 mg Intravenous Q6H PRN    oxybutynin (DITROPAN) tablet 5 mg  5 mg Oral BID    senna (SENOKOT) tablet 8 6 mg  1 tablet Oral Daily    sodium chloride 0 9 % bolus 500 mL  500 mL Intravenous Once    and PTA meds:   Prior to Admission Medications   Prescriptions Last Dose Informant Patient Reported? Taking? NON FORMULARY   Yes Yes   albuterol (PROAIR HFA) 90 mcg/act inhaler   Yes Yes   Sig: Inhale   apixaban (ELIQUIS) 5 mg   Yes Yes   Sig: Take 1 tablet by mouth 2 (two) times a day   aspirin (ECOTRIN LOW STRENGTH) 81 mg EC tablet   Yes Yes   Sig: Take by mouth   citalopram (CeleXA) 10 mg tablet   Yes Yes   Sig: Take by mouth   digoxin (LANOXIN) 0 125 mg tablet   Yes Yes   Sig: Take 1 tablet by mouth daily   furosemide (LASIX) 40 mg tablet   Yes Yes   Sig: Take by mouth 2 (two) times a day     lisinopril (ZESTRIL) 2 5 mg tablet   Yes Yes   Sig: Take by mouth   loratadine (CLARITIN) 10 mg tablet   Yes Yes   Sig: Take by mouth   metoprolol tartrate (LOPRESSOR) 50 mg tablet   Yes Yes   Sig: Take 50 mg by mouth every 12 (twelve) hours   oxybutynin (DITROPAN) 5 mg tablet   Yes Yes   Sig: Take 1 tablet by mouth 2 (two) times a day      Facility-Administered Medications: None       diltiazem 1-15 mg/hr Last Rate: 5 mg/hr (02/11/18 2300)       No Known Allergies    Objective   Vitals: Blood pressure 121/55, pulse 81, temperature 98 4 °F (36 9 °C), temperature source Oral, resp  rate 16, height 5' 10" (1 778 m), weight 106 kg (232 lb 12 9 oz), SpO2 94 %  ,     Body mass index is 33 4 kg/m²  ,     Systolic (20ZHT), UWS:768 , Min:82 , LESLIE:883     Diastolic (19FHZ), JGA:05, Min:44, Max:78            Intake/Output Summary (Last 24 hours) at 02/12/18 1115  Last data filed at 02/12/18 0745   Gross per 24 hour   Intake             1000 ml   Output              450 ml   Net              550 ml     Weight (last 2 days)     Date/Time   Weight    02/12/18 0539  106 (232 81)    02/11/18 2243  105 (230 6)    02/11/18 1728  110 (243)            Invasive Devices     Peripheral Intravenous Line            Peripheral IV 02/11/18 Left Antecubital less than 1 day Physical Exam   Constitutional: He is oriented to person, place, and time  No distress  HENT:   Head: Normocephalic and atraumatic  Eyes: Pupils are equal, round, and reactive to light  Neck: Normal range of motion  Neck supple  Cardiovascular: Normal rate, normal heart sounds and intact distal pulses  An irregularly irregular rhythm present  Pulmonary/Chest: Effort normal and breath sounds normal    Abdominal: Soft  Bowel sounds are normal    Neurological: He is alert and oriented to person, place, and time  Skin: Skin is warm and dry  He is not diaphoretic  Psychiatric: He has a normal mood and affect  Nursing note and vitals reviewed          LABORATORY RESULTS:    Results from last 7 days  Lab Units 02/12/18  0047 02/11/18  2109 02/11/18  1806   TROPONIN I ng/mL 0 06* 0 06* 0 05*     CBC with diff:   Results from last 7 days  Lab Units 02/12/18  0524 02/11/18  1806   WBC Thousand/uL 8 61 10 13   HEMOGLOBIN g/dL 14 6 15 7   HEMATOCRIT % 44 6 48 3   MCV fL 95 95   PLATELETS Thousands/uL 285 292   MCH pg 31 1 30 8   MCHC g/dL 32 7 32 5   RDW % 14 0 14 0   MPV fL 10 8 10 4       CMP:  Results from last 7 days  Lab Units 02/12/18  0524 02/11/18  1806   SODIUM mmol/L 135* 136   POTASSIUM mmol/L 5 0 4 5   CHLORIDE mmol/L 95* 94*   CO2 mmol/L 25 29   ANION GAP mmol/L 15* 13   BUN mg/dL 51* 39*   CREATININE mg/dL 4 58* 3 78*   GLUCOSE RANDOM mg/dL 119 111   CALCIUM mg/dL 8 4 9 3   AST U/L  --  39   ALT U/L  --  34   ALK PHOS U/L  --  46   TOTAL PROTEIN g/dL  --  7 2   BILIRUBIN TOTAL mg/dL  --  1 20*   EGFR ml/min/1 73sq m 11 14       BMP:  Results from last 7 days  Lab Units 02/12/18  0524 02/11/18  1806   SODIUM mmol/L 135* 136   POTASSIUM mmol/L 5 0 4 5   CHLORIDE mmol/L 95* 94*   CO2 mmol/L 25 29   BUN mg/dL 51* 39*   CREATININE mg/dL 4 58* 3 78*   GLUCOSE RANDOM mg/dL 119 111   CALCIUM mg/dL 8 4 9 3          Lab Results   Component Value Date    NTBNP 760 (H) 02/11/2018            Results from last 7 days  Lab Units 18  0524   MAGNESIUM mg/dL 1 9         Results from last 7 days  Lab Units 18  0524 18  1806   INR  1 48* 1 28*         Cardiac testing:   Results for orders placed during the hospital encounter of 16   Echo complete with contrast if indicated    Narrative [address], [address] [facility]  [phone]    Transthoracic Echocardiogram  2D, M-mode, Doppler, and Color Doppler    Study date:  2016    Patient: Ani Garcia  MR number: [id number]  Account number: [de-identified]  : 1935  Age: [de-identified] years  Gender: Male  Status: Outpatient  Location: [address] [place] [address]  Height: 69 in  Weight: 218 5 lb  BP: 110/ 70 mmHg    Indications: CHF    Diagnoses: I50 9 - Heart failure, unspecified    Sonographer:  SEMAJ Styles  Primary Physician:  Nadiya Guardado DO  Referring Physician:  Noni Cabot, MD  Group:  [facility]  Interpreting Physician:  Lisandro Rascon MD    SUMMARY    LEFT VENTRICLE:  Size was normal   Systolic function was at the lower limits of normal  Ejection fraction was  estimated to be 50 %  There was mild diffuse hypokinesis  Wall thickness was at the upper limits of normal     LEFT ATRIUM:  The atrium was mildly dilated  MITRAL VALVE:  There was mild regurgitation  AORTIC VALVE:  There was mild regurgitation  TRICUSPID VALVE:  There was mild regurgitation  Estimated peak PA pressure was 25 mmHg  AORTA:  The root exhibited dilatation  ( 4 5 cm at the level of the sinuses )    HISTORY: PRIOR HISTORY: Atrial fibrillation, hypertension, nonischemic  cardiomyopathy  PROCEDURE: The study was performed in the [address] [place] [address]  This was a routine study  The transthoracic approach was used  The study  included complete 2D imaging, M-mode, complete spectral Doppler, and color  Doppler   Images were obtained from the parasternal, apical, subcostal, and  suprasternal notch acoustic windows  Image quality was adequate  LEFT VENTRICLE: Size was normal  Systolic function was at the lower limits of  normal  Ejection fraction was estimated to be 50 %  There was mild diffuse  hypokinesis  Wall thickness was at the upper limits of normal     RIGHT VENTRICLE: The size was normal  Systolic function was normal  Wall  thickness was normal     LEFT ATRIUM: The atrium was mildly dilated  RIGHT ATRIUM: Size was normal     MITRAL VALVE: Valve structure was normal  There was normal leaflet separation  DOPPLER: The transmitral velocity was within the normal range  There was no  evidence for stenosis  There was mild regurgitation  AORTIC VALVE: The valve was trileaflet  Leaflets exhibited normal thickness and  normal cuspal separation  DOPPLER: Transaortic velocity was within the normal  range  There was no evidence for stenosis  There was mild regurgitation  TRICUSPID VALVE: The valve structure was normal  There was normal leaflet  separation  DOPPLER: The transtricuspid velocity was within the normal range  There was no evidence for stenosis  There was mild regurgitation  Estimated  peak PA pressure was 25 mmHg  PULMONIC VALVE: Not well visualized  PERICARDIUM: There was no pericardial effusion  The pericardium was normal in  appearance  AORTA: The root exhibited dilatation   ( 4 5 cm at the level of the sinuses )    SYSTEM MEASUREMENT TABLES    2D  %FS: 34 71 %  AV Diam: 4 45 cm  EDV(Teich): 139 15 ml  EF Biplane: 44 86 %  EF(Cube): 72 17 %  EF(Teich): 63 39 %  ESV(Cube): 42 95 ml  ESV(Teich): 50 94 ml  IVSd: 1 25 cm  LA Area: 23 76 cm2  LA Diam: 4 22 cm  LVEDV MOD A2C: 107 4 ml  LVEDV MOD A4C: 65 6 ml  LVEDV MOD BP: 86 27 ml  LVEF MOD A2C: 38 31 %  LVEF MOD A4C: 50 97 %  LVESV MOD A2C: 66 25 ml  LVESV MOD A4C: 32 16 ml  LVESV MOD BP: 47 57 ml  LVIDd: 5 36 cm  LVIDs: 3 5 cm  LVLd A2C: 9 52 cm  LVLd A4C: 8 96 cm  LVLs A2C: 7 75 cm  LVLs A4C: 7 27 cm  LVPWd: 1 02 cm  RA Area: 16 94 cm2  SI(Cube): 51 81 ml/m2  SI(Teich): 41 03 ml/m2  SV MOD A2C: 41 14 ml  SV MOD A4C: 33 44 ml  SV(Cube): 111 39 ml  SV(Teich): 88 21 ml    CW  TR MaxP 86 mmHg  TR Vmax: 2 49 m/s    Inters\[facility]\"" Commission Accredited Echocardiography Laboratory    Prepared and electronically signed by    Billy Rivers MD  Signed 2016 16:24:13       Results for orders placed in visit on 06/09/15   NM myocardial perfusion spect (stress and/or rest)    Narrative PHARMACOLOGIC STRESS TEST, LEXISCAN          INDICATION-  Primary cardiomyopathy  History of cellulitis and lower   extremity edema  Provided ICD-9 Code- 425 4    COMPARISON- No prior studies,   TECHNIQUE-  Pharmacologic stress testing was performed utilizing   [employer]  SPECT myocardial perfusion images was performed  Dosages of   TC-99-mm Myoview were 10 7 mCi and 44 8 mCi IV  An initial dose of   9 4 mCi was attempted, however there was dose infiltration which   resulted in diminished activity on resting images  Therefore, an   additional 10 7 mCi was given at rest, and the stress dose increased to   44 8 mCi  Time to peak imaging for rest 45 minutes and stress 34   minutes  Both rest and stress images were performed  Images were then   reconstructed in the short, vertical and horizontal long axes   projections  Baseline heart rate 93 beats per minute  Peak heart of 100 beats per minute  Baseline blood pressure 125/71 mmHg  Peak blood pressure 128/70 mmHg  Symptoms-   None   Please see cardiology report of physiologic data  FINDINGS-   OVERALL QUALITY-   Acceptable  ARTIFACT-  See below   PERFUSION IMAGES-   Although there is apparent diminished activity in   the inferior wall, this is felt to be related to diaphragm attenuation   artifact  CT attenuation correction protocol could not be utilized      There is no definite ischemia or fixed perfusion deficit   WALL MOTION-  Severe global hypokinesis  Further supporting lack of a   focal defect of the inferior wall, although hypokinetic, there is   inferior wall contractility similar to the remainder of the myocardium   EJECTION FRACTION-  21 %   IMPRESSION-   1  There is no definite myocardial ischemia or fixed perfusion deficit   2  Left ventricular ejection fraction- 21%, with severe global   hypokinesis   Transcribed on- [id number]           GEETA Cain MD        Reading Radiologist- GEETA Alva MD        Electronically Signed- GEETA Alva MD        Released Date Time- 06/16/15 1211      ------------------------------------------------------------------------------   AMRIK RAMOS          Imaging: I have personally reviewed pertinent reports  and I have personally reviewed pertinent films in PACS  Xr Chest 1 View Portable  Result Date: 2/12/2018  Narrative: CHEST INDICATION:  Chest pain COMPARISON:  6/9/2015 VIEWS:   AP frontal IMAGES:  1 FINDINGS: There is mild enlargement of the cardiac silhouette  The lungs are clear  No pneumothorax or pleural effusion  Visualized osseous structures appear within normal limits for the patient's age  Impression: Mild enlargement of cardiac silhouette  Clear lungs  Workstation performed: [id number]       EKG reviewed personally:  2/11/18 at 5:31 p m  atrial fibrillation with rapid ventricular response at 140 beats per minute  There was poor anterior R-wave progression  There is nonspecific ST T wave changes  11/20/18 at 9:03 p m  atrial fibrillation at 1 1 1 beats per minute    Nonspecific ST T wave changes were present      Telemetry reviewed personally  Atrial fibrillation now in the 80s to 90s      Assessment  Principal Problem:    Atrial fibrillation with RVR ([facility])  Active Problems:    Hypertension    Chest pain    Chronic systolic CHF (congestive heart failure) ([facility])    Acute kidney injury ([address] )    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    Serum total bilirubin elevated      Assessment/ Plan  Chronic atrial fibrillation with rapid ventricular response  It is unclear what provoked his accelerated HR He has been placed on a rate control strategy   Would continue metoprolol tartrate 50 mg q 12 hours  Will get discontinue digoxin given his current creatinine  Currently on a Cardizem drip  Given his history of a cardiomyopathy, is preferable to rate control with a beta-blocker  Will wean off IV Cardizem  Can titrate the beta-blocker if necessary  Given his blood pressure of 105, will be very cautious in adding any additional antihypertensive  This certainly could affect his kidney perfusion and cr as well  Check TSH  Anticoagulated with apixaban, reduced dose of 2 5 mg q 12 hours given his age greater than [de-identified] and his elevated creatinine    Nonischemic cardiomyopathy, ejection fraction 50% by echo 2016  current echocardiogram is pending  In the past this was felt to be due to tachy mediated    Chronic systolic heart failure-I will discontinue IV Lasix  A nephrology consult been placed    Acute kidney injury  His creatinine went from 3 78  To 4 58  I discontinued IV Lasix and digoxin        Thank you for allowing us to participate in this patient's care  This pt will follow up with Dr Petra Ortega once discharged  Counseling / Coordination of Care  Total floor / unit time spent today 50 minutes  Greater than 50% of total time was spent with the patient and / or family counseling and / or coordination of care  A description of the counseling / coordination of care: Review of history, current assessment, development of a plan  Code Status: Level 1 - Full Code    ** Please Note: Dragon 360 Dictation voice to text software may have been used in the creation of this document   **

## 2018-02-12 NOTE — ED NOTES
Confirmed with PATEL Harrell ([facility]) that patient is confirmed MS3 admission  Aware of patient's current status  No further questions  Patient to be transferred upstairs shortly        Joanne Mccarty RN  02/11/18 [id number]

## 2018-02-12 NOTE — ASSESSMENT & PLAN NOTE
· Question acute CHF vs  Chronic at this time  CXR shows some congestion on L side of chest with corresponding L sided rales on PE  Minimally elevated BNP, no SOB  Concern that a fib with RVR triggered acute exacerbation CHF  Patient was preloaded with IVF in ED given elevated Cr and low BP  [place] Concern that now patient may be fluid overloaded    · Would give IV lasix x1 if BP >110  · Daily weights, I/Os, QShift bladder scan  · Echo for AM  · Tele  · Cardiology consulted-- appreciate input

## 2018-02-12 NOTE — CONSULTS
Consultation - Nephrology   Jimmie Sean 80 y o  male MRN: [id number]  Unit/Bed#: -01 Encounter: [id number]    ASSESSMENT and PLAN:  1  Acute kidney injury:  Creatinine 3 78 on admission, today creatinine further elevated to 4 58  Baseline creatinine appears to be 1 2-1 3 from 2 years ago, therefore there is likely some underlying chronicity which may be related to cardiorenal, hypertension; may have been some progression of underlying disease  · JOHN pre renal/ATN due to hypotension/cardiorenal, lisinopril  ?  Intrinsic- Rash, recent antibiotic use  No peripheral eosinophilia noted  Post renal also in the differential with history of enlarged prostate ~20 years ago and no follow-up monitoring  Renal ultrasound shows no hydronephrosis  Will check PVR  · Renal ultrasound:  Right kidney 10 6 cm, left kidney 11 7 cm, no hydronephrosis, no masses, simple cysts bilaterally, prostate enlarged with mass effect on bladder base  Bladder normally distended  · Blood pressure has improved, holding lisinopril, Lasix (last dose of Lasix 02/11/2018)  Hopefully creatinine will begin to plateau  · Plan:  Obtain urinalysis, check PVR/urinary retention protocol, continue to hold lisinopril and Lasix  No nephrotoxic agents  Check labs in the a m -monitor renal function, check phosphorus level  Restrict potassium intake  2  Atrial fibrillation with RVR:  Rate has improved-controlled  Cardiology following  · On Eliquis  · On beta-blocker  3  Nonischemic cardiomyopathy, chronic systolic heart failure:  Echocardiogram 2016 ejection fraction 50%  · Awaiting results of repeat echocardiogram  · Digoxin level 1 1  Digoxin on hold per Cardiology   · BMP mildly elevated 120 68  4  Hypertension/low blood pressure:  Holding lisinopril, Cardizem drip, diuretic  Blood pressure improving  5  Elevated total bilirubin:  Liver enzymes within normal limits  6  Rash:  Recent Keflex use, afebrile  7   Skin cancer left lower leg: Status post excision    SUMMARY OF RECOMMENDATIONS:  · Check PVR, urinary retention protocol  · Urinalysis with microscopic evaluation  · Await results of repeat echocardiogram  · Hold lisinopril and Lasix at this time  · Continue metoprolol  · Closely monitor renal function  · Avoid nephrotoxic agents  · Further recommendations forthcoming depending on results of workup  · Diet restriction-at this time restrict potassium intake  He need to place patient on a pre renal diet will check phosphorus in the a m  HISTORY OF PRESENT ILLNESS:  Requesting Physician: Kamila Adames MD  Reason for Consult:  Acute kidney injury, CKD    Rynan Jeff is a 80 y o  male with a history of hypertension, chronic systolic heart failure, atrial fibrillation and nonischemic cardiomyopathy who was admitted to [facility] after presenting with chest pain, rash, vomited x1- he was found to be in rapid atrial fib with ventricular rates of 140-150 beats per minute  In addition to the above patient also was told approximately 20 years ago that he had enlarged prostate and was seen by a urologist but had no follow-up  The patient recently had skin cancer removed from his leg and was on a course of Keflex which he completed  He denies the use of NSAIDs  He only uses Tylenol for his arthritis pain  Creatinine elevated to 3 78 on admission and the day following (02/12/2018) creatinine is up to 4 58  A renal consultation is requested today for assistance in the management of acute kidney injury  On review of records it appears that 2 years ago baseline creatinine was 1 2-1 3-he has had no blood work since  Patient was seen and examined  He has a diffuse pruritic, macular rash  He states that at times it is difficult for him to empty his bladder and his urinary stream has slowed  He denies blood or foam in urine  He becomes short of breath with activities of daily living which seems to be worsening    At this time he denies chest pain  He is comfortable at rest     PAST MEDICAL HISTORY:  Past Medical History:   Diagnosis Date    Atrial fibrillation ([address] )     Hypertension     Macular degeneration        PAST SURGICAL HISTORY:  History reviewed  No pertinent surgical history  ALLERGIES:  No Known Allergies    SOCIAL HISTORY:  History   Alcohol Use No     History   Drug Use No     History   Smoking Status    Never Smoker   Smokeless Tobacco    Not on file       FAMILY HISTORY:  History reviewed  No pertinent family history      MEDICATIONS:    Current Facility-Administered Medications:     acetaminophen (TYLENOL) tablet 650 mg, 650 mg, Oral, Q4H PRN, Sandra Hoyt PA-C    albuterol (PROVENTIL HFA,VENTOLIN HFA) inhaler 2 puff, 2 puff, Inhalation, Q4H PRN, Sandra Hoyt PA-C    apixaban (ELIQUIS) tablet 2 5 mg, 2 5 mg, Oral, BID, Sandra Hoyt PA-C, 2 5 mg at 02/12/18 0856    aspirin (ECOTRIN LOW STRENGTH) EC tablet 81 mg, 81 mg, Oral, Daily, Sandra Hoyt PA-C, 81 mg at 02/12/18 0856    calcium carbonate (TUMS) chewable tablet 1,000 mg, 1,000 mg, Oral, Daily PRN, Sandra Hoyt PA-C    citalopram (CeleXA) tablet 10 mg, 10 mg, Oral, Daily, Sandra Hoyt PA-C, 10 mg at 02/12/18 0855    diltiazem (CARDIZEM) 125 mg in sodium chloride 0 9 % 125 mL infusion, 1-15 mg/hr, Intravenous, Titrated, TAQUERIA Ocasio, Last Rate: 2 5 mL/hr at 02/12/18 1157, 2 5 mg/hr at 02/12/18 1157    hydrocortisone 2 5 % cream, , Topical, 4x Daily PRN, Laverne De Luna MD    loratadine (CLARITIN) tablet 10 mg, 10 mg, Oral, Daily, Sandra Hoyt PA-C, 10 mg at 02/12/18 0856    metoprolol tartrate (LOPRESSOR) tablet 50 mg, 50 mg, Oral, Q12H [facility], Sandra Hoyt PA-C, 50 mg at 02/12/18 0856    ondansetron (ZOFRAN) injection 4 mg, 4 mg, Intravenous, Q6H PRN, Sandra Hoyt PA-C    oxybutynin (DITROPAN) tablet 5 mg, 5 mg, Oral, BID, Sandra Hoyt PA-C, 5 mg at 02/12/18 7657    senna (SENOKOT) tablet 8 6 mg, 1 tablet, Oral, Daily, Sandra Hoyt PA-C, 8 6 mg at 02/12/18 0856    sodium chloride 0 9 % bolus 500 mL, 500 mL, Intravenous, Once, Sandra Hoyt PA-C, Last Rate: 83 3 mL/hr at 02/12/18 0744, 500 mL at 02/12/18 0744    REVIEW OF SYSTEMS:  Constitutional:  Positive for fatigue, vomited x1  Eyes: Negative for visual disturbance  Respiratory: Negative for cough, positive for shortness of breath   Cardiovascular:  Had chest pain the night before admission  Gastrointestinal: Negative for abdominal pain, constipation, diarrhea, nausea and vomiting  Genitourinary:  Complains of dysuria, no hematuria  Musculoskeletal: Negative for arthralgias, joint swelling  Skin:  Positive for rash  Neurological: Negative for focal weakness, headaches, dizziness  Hematological: Negative for easy bruising or bleeding  Psychiatric/Behavioral: Negative for confusion and sleep disturbance  All the systems were reviewed and were negative except as documented on the HPI  PHYSICAL EXAM:  Current Weight: Weight - Scale: 106 kg (232 lb 12 9 oz)  First Weight: Weight - Scale: 110 kg (243 lb)  Vitals:    02/11/18 2243 02/12/18 0059 02/12/18 0539 02/12/18 0745   BP: 120/78 122/66  121/55   BP Location: Right arm Right arm  Right arm   Pulse: 75   81   Resp: 18   16   Temp: 98 4 °F (36 9 °C)   98 4 °F (36 9 °C)   TempSrc: Oral   Oral   SpO2: 99%   94%   Weight: 105 kg (230 lb 9 6 oz)  106 kg (232 lb 12 9 oz)    Height: 5' 10" (1 778 m)          Intake/Output Summary (Last 24 hours) at 02/12/18 1333  Last data filed at 02/12/18 0745   Gross per 24 hour   Intake             1000 ml   Output              450 ml   Net              550 ml     General:  No acute distress  Sitting out of bed in the chair    Appears comfortable at rest  Skin:  Warm and dry, diffuse rash  Eyes:  Sclera clear  ENT:  Oropharynx moist  Neck:  Supple, no JVD, no bruit  Chest:  Clear bilaterally  CVS:  Irregular rhythm, no murmur, rub, gallop appreciated although heart tones are distant  Abdomen:  Rounded, obese, nontender, bowel sounds present  Extremities:  Trace to mild edema  Neuro:  Alert and oriented  Psych:  Appropriate, pleasant, cooperative    Invasive Devices:      Lab Results:     Results from last 7 days  Lab Units 02/12/18  0524 02/11/18  1806   WBC Thousand/uL 8 61 10 13   HEMOGLOBIN g/dL 14 6 15 7   HEMATOCRIT % 44 6 48 3   PLATELETS Thousands/uL 285 292   SODIUM mmol/L 135* 136   POTASSIUM mmol/L 5 0 4 5   CHLORIDE mmol/L 95* 94*   CO2 mmol/L 25 29   BUN mg/dL 51* 39*   CREATININE mg/dL 4 58* 3 78*   CALCIUM mg/dL 8 4 9 3   MAGNESIUM mg/dL 1 9  --    TOTAL PROTEIN g/dL  --  7 2   BILIRUBIN TOTAL mg/dL  --  1 20*   ALK PHOS U/L  --  46   ALT U/L  --  34   AST U/L  --  39   GLUCOSE RANDOM mg/dL 119 111     Other Studies:

## 2018-02-12 NOTE — ASSESSMENT & PLAN NOTE
· Last Cr from 1 year ago 1 18, today 3 7   Question if related to fluid overload state vs  Poor PO intake vs  Post obstructive vs  Progression to CKD  · Received IV fluid bolus and continuous fluids in ED  · D/C fluids as there is concern for fluid overload   · Bladder scan Qshift  · Daily weights, intake/output  · Repeat BMP in AM  · If no improvement consider nephro consult

## 2018-02-12 NOTE — ASSESSMENT & PLAN NOTE
· BP borderline low, high 90s to low 100s; asymptomatic  · Monitor with cardizem gtt  · Hold metoprolol, lisinopril    · Monitor off BP meds and add PRNs if necessary

## 2018-02-12 NOTE — CASE MANAGEMENT
Initial Clinical Review    Admission: Date/Time/Statement: 2/11/18 @ 2055 Inpatient Written     Orders Placed This Encounter   Procedures    Inpatient Admission (expected length of stay for this patient is greater than two midnights)     Standing Status:   Standing     Number of Occurrences:   1     Order Specific Question:   Admitting Physician     Answer:   Mansoor Berman     Order Specific Question:   Level of Care     Answer:   Med Surg [16]     Order Specific Question:   Estimated length of stay     Answer:   More than 2 Midnights     Order Specific Question:   Certification     Answer:   I certify that inpatient services are medically necessary for this patient for a duration of greater than two midnights  See H&P and MD Progress Notes for additional information about the patient's course of treatment  ED: Date/Time/Mode of Arrival:   ED Arrival Information     Expected Arrival Acuity Means of Arrival Escorted By Service Admission Type    - 2/11/2018 17:15 Emergent Walk-In Self General Medicine Urgent    Arrival Complaint    chest pain, vomiting          Chief Complaint:   Chief Complaint   Patient presents with    Rash     pts daughter reports pt was vomiting today and "itchy all over " pt has generalized rash  states c/p last night  pt reports generalized body aches  denies any fevers  History of Illness: 63-year-old male with past medical history of atrial fibrillation (on metoprolol and Eliquis), hypertension, congestive heart failure, presents to emergency department for chest pain and rash  Patient states that the chest discomfort started in his mid substernal chest yesterday while he was at rest, subsiding after a few minutes  Denies any palpitations, shortness of breath, coughing, increased leg swelling  Denies any current chest pain  Denies fevers, chills, nausea, vomiting, abdominal pain   Also developed a rash to the bilateral upper extremities and anterior and posterior trunk   Endorses hives to the anterior trunk that are itchy  Did not take anything for this prior to arrival  Denies new detergents, foods, lotions, environments, medications  ED Vital Signs:   ED Triage Vitals   Temperature Pulse Respirations Blood Pressure SpO2   02/11/18 2243 02/11/18 1728 02/11/18 1728 02/11/18 1728 02/11/18 1823   98 4 °F (36 9 °C) (!) 149 16 125/73 95 %      Temp Source Heart Rate Source Patient Position - Orthostatic VS BP Location FiO2 (%)   02/11/18 1728 02/11/18 1728 02/11/18 1823 02/11/18 1823 --   Oral Monitor Lying Left arm       Pain Score       02/11/18 1728       No Pain        Wt Readings from Last 1 Encounters:   02/12/18 106 kg (232 lb 12 9 oz)       Vital Signs (abnormal): -149, BP 90/45, 91/45, 82/46    Abnormal Labs/Diagnostic Test Results:   CHLORIDE 94*   BUN 39*   CREATININE 3 78*   BILIRUBIN TOTAL 1 20*     Troponin I 0 05       NT-proBNP 760       Protime 16 4     INR 1 28       CXR pending  EKG: a fib RVR rate 140    ED Treatment:   Medication Administration from 02/11/2018 1715 to 02/11/2018 2242       Date/Time Order Dose Route Action     02/11/2018 1800 metoprolol (LOPRESSOR) injection 5 mg 5 mg Intravenous Given     02/11/2018 1830 diltiazem (CARDIZEM) 125 mg in sodium chloride 0 9 % 125 mL infusion 5 mg/hr Intravenous New Bag     02/11/2018 1911 diphenhydrAMINE (BENADRYL) injection 25 mg 25 mg Intravenous Given     02/11/2018 1950 sodium chloride 0 9 % infusion 125 mL/hr Intravenous New Bag          Past Medical/Surgical History:    Active Ambulatory Problems     Diagnosis Date Noted    No Active Ambulatory Problems     Resolved Ambulatory Problems     Diagnosis Date Noted    No Resolved Ambulatory Problems     Past Medical History:   Diagnosis Date    Atrial fibrillation ([facility] )     Hypertension     Macular degeneration        Admitting Diagnosis: Atrial fibrillation ([facility] ) [I48 91]  Chest pain [R07 9]  Rash and nonspecific skin eruption [R21]  Hypotension [I95 9]  JOHN (acute kidney injury) ([facility] ) [N17 9]    Age/Sex: 80 y o  male    Assessment/Plan:   * Atrial fibrillation with RVR ([facility])   Assessment & Plan     ·  upon arrival, started on cardizem gtt in ED with improvement of HR to 90s-100s  · Admit  · Continue cardizem gtt overnight, titrate to keep HR < 95  · Telemetry  · Trend troponins-- first trop minimally bumped at 0 05  · Continue Eliquis, metoprolol (as BP allows), and digoxin  ? Check dig level  · Repeat ECHO: last from 6/2016 shows LEF 50% with mild diffuse hypokinesis  · Cardiology consulted, appreciate input          Chest pain   Assessment & Plan     · Suspect related to A fib with RVR  · See plan above          [facility] ([facility] )   Assessment & Plan     · Last Cr from 1 year ago 1 18, today 3 7  Question if related to fluid overload state vs  Poor PO intake vs  Post obstructive vs  Progression to CKD  · Received IV fluid bolus and continuous fluids in ED  · D/C fluids as there is concern for fluid overload   · Bladder scan Qshift  · Daily weights, intake/output  · Repeat BMP in AM  · If no improvement consider nephro consult          Chronic systolic CHF (congestive heart failure) ([facility])   Assessment & Plan     · Question acute CHF vs  Chronic at this time  CXR shows some congestion on L side of chest with corresponding L sided rales on PE  Minimally elevated BNP, no SOB  Concern that a fib with RVR triggered acute exacerbation CHF  Patient was preloaded with IVF in ED given elevated Cr and low BP  Federico Obrien Concern that now patient may be fluid overloaded    · Would give IV lasix x1 if BP >110  · Daily weights, I/Os, QShift bladder scan  · Echo for AM  · Tele  · Cardiology consulted-- appreciate input          Hypertension   Assessment & Plan     · BP borderline low, high 90s to low 100s; asymptomatic  · Monitor with cardizem gtt  · Hold metoprolol, lisinopril    · Monitor off BP meds and add PRNs if necessary          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   Assessment & Plan     · Recent excision of cancerous skin lesion on L knee  · Daily wound management with hydrogen peroxide and vaseline per patient's dermatologist          Serum total bilirubin elevated   Assessment & Plan     · TBili elevated at 1 2  No other LFT derangement  No jaundice  Diffuse pruritis  · Repeat CMP in AM  · Consider RUQ ultrasound if worsening LFTs             VTE Prophylaxis: Apixaban (Eliquis)  / sequential compression device     Anticipated Length of Stay:  Patient will be admitted on an Inpatient basis with an anticipated length of stay of  > 2 midnights     Justification for Hospital Stay: tx of a fib, JOHN, possible acute CHF    Admission Orders:  Telemetry, trop q3h  OOB with assistance  Bladder scan  Daily weights, I/O  Echo  [facility]  Consult cardiology  Sequential compression device    Scheduled Meds:   Current Facility-Administered Medications:  acetaminophen 650 mg Oral Q4H PRN Sandra Hoyt PA-C    albuterol 2 puff Inhalation Q4H PRN Sandra Hoyt PA-C    apixaban 2 5 mg Oral BID Sandra Hoyt PA-C    aspirin 81 mg Oral Daily Sandra Hoyt PA-C    calcium carbonate 1,000 mg Oral Daily PRN Sandra Hoyt PA-C    citalopram 10 mg Oral Daily Sandra Hoyt PA-C    digoxin 125 mcg Oral Daily Sandra Hoyt PA-C    diltiazem 1-15 mg/hr Intravenous Titrated uLz Bernabe PA-C Last Rate: 5 mg/hr (02/11/18 2300)   loratadine 10 mg Oral Daily Sandra Hoyt PA-C    metoprolol tartrate 50 mg Oral Q12H [address] Sandra Hoyt PA-C    ondansetron 4 mg Intravenous Q6H PRN Sandra Hoyt PA-C    oxybutynin 5 mg Oral BID Sandra Hoyt PA-C    senna 1 tablet Oral Daily Sandra Hoyt PA-C    sodium chloride 500 mL Intravenous Once Sandra Hoyt PA-C Last Rate: 500 mL (02/12/18 0744)     Continuous Infusions:   diltiazem 1-15 mg/hr Last Rate: 5 mg/hr (02/11/18 2300)     PRN Meds: Patrice Morin acetaminophen    albuterol    calcium carbonate    ondansetron    Thank you,  [facility] in the [facility] by Tip Marquez for 2017  Network Utilization Review Department  Phone: [phone]; Fax [phone]  ATTENTION: The Network Utilization Review Department is now centralized for our 7 Facilities  Make a note that we have a new phone and fax numbers for our Department  Please call with any questions or concerns to [phone] and carefully follow the prompts so that you are directed to the right person  All voicemails are confidential  Fax any determinations, approvals, denials, and requests for initial or continue stay review clinical to [phone]  Due to HIGH CALL volume, it would be easier if you could please send faxed requests to expedite your requests and in part, help us provide discharge notifications faster

## 2018-02-12 NOTE — PLAN OF CARE
CARDIOVASCULAR - ADULT     Maintains optimal cardiac output and hemodynamic stability Progressing     Absence of cardiac dysrhythmias or at baseline rhythm Progressing        Potential for Falls     Patient will remain free of falls Progressing        SKIN/TISSUE INTEGRITY - ADULT     Skin integrity remains intact Progressing     Incision(s), wounds(s) or drain site(s) healing without S/S of infection Progressing     Oral mucous membranes remain intact Progressing

## 2018-02-12 NOTE — ASSESSMENT & PLAN NOTE
·  upon arrival, started on cardizem gtt in ED with improvement of HR to 90s-100s  · Admit  · Continue cardizem gtt overnight, titrate to keep HR < 95  · Telemetry  · Trend troponins-- first trop minimally bumped at 0 05  · Continue Eliquis, metoprolol (as BP allows), and digoxin  · Check dig level  · Repeat ECHO: last from 6/2016 shows LEF 50% with mild diffuse hypokinesis  · Cardiology consulted, appreciate input

## 2018-02-12 NOTE — TELEPHONE ENCOUNTER
sherman called to let Dr Amrita Huggins know her father in law is currently in [facility]

## 2018-02-12 NOTE — H&P
H&P- Millicent Aguilar 1935, 80 y o  male MRN: [id number]    Unit/Bed#: [facility] Encounter: [id number]    Primary Care Provider: Codey Abel DO   Date and time admitted to hospital: 2/11/2018  5:21 PM    * Atrial fibrillation with RVR (HCC)   Assessment & Plan    ·  upon arrival, started on cardizem gtt in ED with improvement of HR to 90s-100s  · Admit  · Continue cardizem gtt overnight, titrate to keep HR < 95  · Telemetry  · Trend troponins-- first trop minimally bumped at 0 05  · Continue Eliquis, metoprolol (as BP allows), and digoxin  · Check dig level  · Repeat ECHO: last from 6/2016 shows LEF 50% with mild diffuse hypokinesis  · Cardiology consulted, appreciate input        Chest pain   Assessment & Plan    · Suspect related to A fib with RVR  · See plan above        Acute kidney injury ([address] )   Assessment & Plan    · Last Cr from 1 year ago 1 18, today 3 7  Question if related to fluid overload state vs  Poor PO intake vs  Post obstructive vs  Progression to CKD  · Received IV fluid bolus and continuous fluids in ED  · D/C fluids as there is concern for fluid overload   · Bladder scan Qshift  · Daily weights, intake/output  · Repeat BMP in AM  · If no improvement consider nephro consult        Chronic systolic CHF (congestive heart failure) (HCC)   Assessment & Plan    · Question acute CHF vs  Chronic at this time  CXR shows some congestion on L side of chest with corresponding L sided rales on PE  Minimally elevated BNP, no SOB  Concern that a fib with RVR triggered acute exacerbation CHF  Patient was preloaded with IVF in ED given elevated Cr and low BP  Javad Garcia Concern that now patient may be fluid overloaded    · Would give IV lasix x1 if BP >110  · Daily weights, I/Os, QShift bladder scan  · Echo for AM  · Tele  · Cardiology consulted-- appreciate input        Hypertension   Assessment & Plan    · BP borderline low, high 90s to low 100s; asymptomatic  · Monitor with cardizem gtt  · Hold metoprolol, lisinopril    · Monitor off BP meds and add PRNs if necessary        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   Assessment & Plan    · Recent excision of cancerous skin lesion on L knee  · Daily wound management with hydrogen peroxide and vaseline per patient's dermatologist        Serum total bilirubin elevated   Assessment & Plan    · TBili elevated at 1 2  No other LFT derangement  No jaundice  Diffuse pruritis  · Repeat CMP in AM  · Consider RUQ ultrasound if worsening LFTs          VTE Prophylaxis: Apixaban (Eliquis)  / sequential compression device   Code Status: Level 1-- Full Code  POLST: POLST form is not discussed and not completed at this time  Discussion with family: daughter updated at bedside     Anticipated Length of Stay:  Patient will be admitted on an Inpatient basis with an anticipated length of stay of  > 2 midnights  Justification for Hospital Stay: tx of a fib, JOHN, possible acute CHF    Total Time for Visit, including Counseling / Coordination of Care: 30 minutes  Greater than 50% of this total time spent on direct patient counseling and coordination of care  Chief Complaint:   pruritis     History of Present Illness:    Yeny Cano is a 80 y o  male with multiple medical conditions presents to the ED for evaluation of itching  Patient and family report that patient has chronically dry skin and he is often mildly itchy  They reports that overnight he we increasingly itchy overnight last night and did not sleep much  No new soaps/medications  No notable lesions or rash  He also had some chest pain that started last night and resolved  He then had a repeated episode of chest pain earlier in the day today described as a pressure sensation located in lower chest/mid epigastrum  Pain was non-radiating  No associated neck/jaw/arm pain  Had one episode of vomiting in afternoon and then sx of chest pain resolved  He denies any sensation of palpitations or SOB  No cough, fevers, chills  Has some mild LE swelling that has increased since receiving fluids in the ED  Review of Systems:    Review of Systems   Constitutional: Negative  HENT: Negative  Eyes: Negative  Respiratory: Negative  Cardiovascular: Positive for chest pain and leg swelling  Gastrointestinal: Negative  Genitourinary: Negative  Musculoskeletal: Negative  Skin:        Diffuse itching   Neurological: Negative  Hematological: Negative  Psychiatric/Behavioral: Negative  Past Medical and Surgical History:     Past Medical History:   Diagnosis Date    Atrial fibrillation ([address] )     Hypertension     Macular degeneration        History reviewed  No pertinent surgical history  Meds/Allergies:    Prior to Admission medications    Medication Sig Start Date End Date Taking?  Authorizing Provider   albuterol [facility] HFA) 90 mcg/act inhaler Inhale 4/14/15  Yes Historical Provider, MD   apixaban (ELIQUIS) 5 mg Take 1 tablet by mouth 2 (two) times a day 6/25/15  Yes Historical Provider, MD   aspirin (ECOTRIN LOW STRENGTH) 81 mg EC tablet Take by mouth   Yes Historical Provider, MD   citalopram (CeleXA) 10 mg tablet Take by mouth 7/15/15  Yes Historical Provider, MD   digoxin (LANOXIN) 0 125 mg tablet Take 1 tablet by mouth daily 7/2/15  Yes Historical Provider, MD   furosemide (LASIX) 40 mg tablet Take by mouth 2 (two) times a day   5/2/17  Yes Historical Provider, MD   lisinopril (ZESTRIL) 2 5 mg tablet Take by mouth 6/17/15  Yes Historical Provider, MD   loratadine (CLARITIN) 10 mg tablet Take by mouth   Yes Historical Provider, MD   metoprolol tartrate (LOPRESSOR) 50 mg tablet Take 50 mg by mouth every 12 (twelve) hours   Yes Historical Provider, MD   NON FORMULARY    Yes Historical Provider, MD   oxybutynin (DITROPAN) 5 mg tablet Take 1 tablet by mouth 2 (two) times a day   Yes Historical Provider, MD   metoprolol succinate (TOPROL-XL) 50 mg 24 hr tablet Take 1 tablet by mouth 2 (two) times a day 6/25/15 2/11/18 Yes Historical Provider, MD     I have reviewed home medications with patient family member  Allergies: No Known Allergies    Social History:     Marital Status:    Occupation: retired  Patient Pre-hospital Living Situation: home with family  Patient Pre-hospital Level of Mobility: independent  Patient Pre-hospital Diet Restrictions: cardiac prudent  Substance Use History:   History   Alcohol Use No     History   Smoking Status    Never Smoker   Smokeless Tobacco    Not on file     History   Drug Use No       Family History:    non-contributory    Physical Exam:     Vitals:   Blood Pressure: 111/51 (02/11/18 2145)  Pulse: 98 (02/11/18 2145)  Temp Source: Oral (02/11/18 1728)  Respirations: 18 (02/11/18 2145)  Weight - Scale: 110 kg (243 lb) (02/11/18 1728)  SpO2: 96 % (02/11/18 2145)    Physical Exam   Constitutional: He is oriented to person, place, and time  No distress  obese   HENT:   Head: Normocephalic and atraumatic  Eyes: Conjunctivae and EOM are normal  Pupils are equal, round, and reactive to light  No scleral icterus  Neck: No JVD present  Cardiovascular: Intact distal pulses  Exam reveals no gallop and no friction rub  No murmur heard  Irregularly irregular heart beat   Pulmonary/Chest: Effort normal  No respiratory distress  He has no wheezes  He has rales (LLL)  Abdominal: Soft  He exhibits no distension  There is no tenderness  There is no rebound  Obese abdomen     Musculoskeletal: He exhibits edema (+1 b/l LE edema)  He exhibits no tenderness  Neurological: He is alert and oriented to person, place, and time  He displays normal reflexes  No cranial nerve deficit  He exhibits normal muscle tone  Skin: Skin is warm  He is not diaphoretic  No erythema  No rash/urticaria noted  Diffusely dry skin   Psychiatric: He has a normal mood and affect  His behavior is normal        Additional Data:     Lab Results: I have personally reviewed pertinent reports  Results from last 7 days  Lab Units 02/11/18  1806   WBC Thousand/uL 10 13   HEMOGLOBIN g/dL 15 7   HEMATOCRIT % 48 3   PLATELETS Thousands/uL 292   NEUTROS PCT % 81*   LYMPHS PCT % 13*   MONOS PCT % 6   EOS PCT % 0       Results from last 7 days  Lab Units 02/11/18  1806   SODIUM mmol/L 136   POTASSIUM mmol/L 4 5   CHLORIDE mmol/L 94*   CO2 mmol/L 29   BUN mg/dL 39*   CREATININE mg/dL 3 78*   CALCIUM mg/dL 9 3   TOTAL PROTEIN g/dL 7 2   BILIRUBIN TOTAL mg/dL 1 20*   ALK PHOS U/L 46   ALT U/L 34   AST U/L 39   GLUCOSE RANDOM mg/dL 111       Results from last 7 days  Lab Units 02/11/18  1806   INR  1 28*       Imaging: I have personally reviewed pertinent reports  XR chest 1 view portable    (Results Pending)       EKG, Pathology, and Other Studies Reviewed on Admission:   · EKG: a fib RVR rate 140    Allscripts / Epic Records Reviewed: Yes     ** Please Note: This note has been constructed using a voice recognition system   **

## 2018-02-12 NOTE — ASSESSMENT & PLAN NOTE
· TBili elevated at 1 2  No other LFT derangement  No jaundice   Diffuse pruritis  · Repeat CMP in AM  · Consider RUQ ultrasound if worsening LFTs

## 2018-02-13 ENCOUNTER — APPOINTMENT (INPATIENT)
Dept: NON INVASIVE DIAGNOSTICS | Facility: HOSPITAL | Age: 83
DRG: 682 | End: 2018-02-13
Payer: MEDICARE

## 2018-02-13 PROBLEM — E87.1 HYPONATREMIA: Status: ACTIVE | Noted: 2018-02-13

## 2018-02-13 PROBLEM — R79.89 AZOTEMIA: Status: ACTIVE | Noted: 2018-02-13

## 2018-02-13 PROBLEM — N18.30 STAGE 3 CHRONIC KIDNEY DISEASE (HCC): Status: ACTIVE | Noted: 2018-02-13

## 2018-02-13 PROBLEM — E87.5 HYPERKALEMIA: Status: ACTIVE | Noted: 2018-02-13

## 2018-02-13 LAB
ALBUMIN SERPL BCP-MCNC: 2.9 G/DL (ref 3.5–5)
ALP SERPL-CCNC: 31 U/L (ref 46–116)
ALT SERPL W P-5'-P-CCNC: 42 U/L (ref 12–78)
ANION GAP SERPL CALCULATED.3IONS-SCNC: 11 MMOL/L (ref 4–13)
AST SERPL W P-5'-P-CCNC: 88 U/L (ref 5–45)
BACTERIA UR QL AUTO: ABNORMAL /HPF
BILIRUB SERPL-MCNC: 1.3 MG/DL (ref 0.2–1)
BILIRUB UR QL STRIP: NEGATIVE
BUN SERPL-MCNC: 82 MG/DL (ref 5–25)
CALCIUM SERPL-MCNC: 8.2 MG/DL (ref 8.3–10.1)
CHLORIDE SERPL-SCNC: 96 MMOL/L (ref 100–108)
CHLORIDE UR-SCNC: <10 MMOL/L (ref 10–330)
CLARITY UR: ABNORMAL
CO2 SERPL-SCNC: 25 MMOL/L (ref 21–32)
COLOR UR: YELLOW
CREAT SERPL-MCNC: 4.06 MG/DL (ref 0.6–1.3)
EOSINOPHIL NFR URNS MANUAL: 0 %
GFR SERPL CREATININE-BSD FRML MDRD: 13 ML/MIN/1.73SQ M
GLUCOSE SERPL-MCNC: 121 MG/DL (ref 65–140)
GLUCOSE UR STRIP-MCNC: NEGATIVE MG/DL
HGB UR QL STRIP.AUTO: ABNORMAL
KETONES UR STRIP-MCNC: NEGATIVE MG/DL
LEUKOCYTE ESTERASE UR QL STRIP: NEGATIVE
MAGNESIUM SERPL-MCNC: 1.9 MG/DL (ref 1.6–2.6)
NITRITE UR QL STRIP: NEGATIVE
NON-SQ EPI CELLS URNS QL MICRO: ABNORMAL /HPF
OSMOLALITY UR: 490 MMOL/KG
PH UR STRIP.AUTO: 5 [PH] (ref 4.5–8)
PHOSPHATE SERPL-MCNC: 4.4 MG/DL (ref 2.3–4.1)
POTASSIUM SERPL-SCNC: 5 MMOL/L (ref 3.5–5.3)
PROT SERPL-MCNC: 6 G/DL (ref 6.4–8.2)
PROT UR STRIP-MCNC: ABNORMAL MG/DL
RBC #/AREA URNS AUTO: ABNORMAL /HPF
SODIUM 24H UR-SCNC: 21 MOL/L
SODIUM SERPL-SCNC: 132 MMOL/L (ref 136–145)
SP GR UR STRIP.AUTO: >=1.03 (ref 1–1.03)
TSH SERPL DL<=0.05 MIU/L-ACNC: 0.65 UIU/ML (ref 0.36–3.74)
UROBILINOGEN UR QL STRIP.AUTO: 0.2 E.U./DL
WBC #/AREA URNS AUTO: ABNORMAL /HPF

## 2018-02-13 PROCEDURE — 84443 ASSAY THYROID STIM HORMONE: CPT | Performed by: NURSE PRACTITIONER

## 2018-02-13 PROCEDURE — 93306 TTE W/DOPPLER COMPLETE: CPT | Performed by: INTERNAL MEDICINE

## 2018-02-13 PROCEDURE — 84300 ASSAY OF URINE SODIUM: CPT | Performed by: INTERNAL MEDICINE

## 2018-02-13 PROCEDURE — 83735 ASSAY OF MAGNESIUM: CPT | Performed by: NURSE PRACTITIONER

## 2018-02-13 PROCEDURE — 93306 TTE W/DOPPLER COMPLETE: CPT

## 2018-02-13 PROCEDURE — 84100 ASSAY OF PHOSPHORUS: CPT | Performed by: NURSE PRACTITIONER

## 2018-02-13 PROCEDURE — 80053 COMPREHEN METABOLIC PANEL: CPT | Performed by: NURSE PRACTITIONER

## 2018-02-13 PROCEDURE — 99232 SBSQ HOSP IP/OBS MODERATE 35: CPT | Performed by: INTERNAL MEDICINE

## 2018-02-13 PROCEDURE — 82436 ASSAY OF URINE CHLORIDE: CPT | Performed by: INTERNAL MEDICINE

## 2018-02-13 PROCEDURE — 81001 URINALYSIS AUTO W/SCOPE: CPT | Performed by: NURSE PRACTITIONER

## 2018-02-13 PROCEDURE — 87205 SMEAR GRAM STAIN: CPT | Performed by: INTERNAL MEDICINE

## 2018-02-13 PROCEDURE — 83935 ASSAY OF URINE OSMOLALITY: CPT | Performed by: INTERNAL MEDICINE

## 2018-02-13 RX ORDER — NYSTATIN 100000 [USP'U]/G
POWDER TOPICAL 3 TIMES DAILY
Status: DISCONTINUED | OUTPATIENT
Start: 2018-02-13 | End: 2018-02-18 | Stop reason: HOSPADM

## 2018-02-13 RX ORDER — METOPROLOL TARTRATE 100 MG/1
100 TABLET ORAL EVERY 12 HOURS SCHEDULED
Status: DISCONTINUED | OUTPATIENT
Start: 2018-02-13 | End: 2018-02-18 | Stop reason: HOSPADM

## 2018-02-13 RX ORDER — DIPHENHYDRAMINE HYDROCHLORIDE, ZINC ACETATE 2; .1 G/100G; G/100G
CREAM TOPICAL 3 TIMES DAILY PRN
Status: DISCONTINUED | OUTPATIENT
Start: 2018-02-13 | End: 2018-02-18 | Stop reason: HOSPADM

## 2018-02-13 RX ADMIN — OXYBUTYNIN CHLORIDE 5 MG: 5 TABLET ORAL at 08:51

## 2018-02-13 RX ADMIN — ACETAMINOPHEN 650 MG: 325 TABLET, FILM COATED ORAL at 21:20

## 2018-02-13 RX ADMIN — DIPHENHYDRAMINE HYDROCHLORIDE, ZINC ACETATE: 2; .1 CREAM TOPICAL at 14:01

## 2018-02-13 RX ADMIN — NYSTATIN: 100000 POWDER TOPICAL at 11:30

## 2018-02-13 RX ADMIN — METOPROLOL TARTRATE 75 MG: 50 TABLET ORAL at 08:51

## 2018-02-13 RX ADMIN — SENNOSIDES 8.6 MG: 8.6 TABLET, FILM COATED ORAL at 08:51

## 2018-02-13 RX ADMIN — NYSTATIN: 100000 POWDER TOPICAL at 17:30

## 2018-02-13 RX ADMIN — METOPROLOL TARTRATE 100 MG: 100 TABLET ORAL at 21:20

## 2018-02-13 RX ADMIN — NYSTATIN 1 APPLICATION: 100000 POWDER TOPICAL at 21:21

## 2018-02-13 RX ADMIN — CITALOPRAM HYDROBROMIDE 10 MG: 20 TABLET ORAL at 08:51

## 2018-02-13 RX ADMIN — ASPIRIN 81 MG: 81 TABLET, COATED ORAL at 08:51

## 2018-02-13 RX ADMIN — APIXABAN 2.5 MG: 2.5 TABLET, FILM COATED ORAL at 08:51

## 2018-02-13 RX ADMIN — OXYBUTYNIN CHLORIDE 5 MG: 5 TABLET ORAL at 17:29

## 2018-02-13 RX ADMIN — LORATADINE 10 MG: 10 TABLET ORAL at 08:51

## 2018-02-13 RX ADMIN — APIXABAN 2.5 MG: 2.5 TABLET, FILM COATED ORAL at 17:29

## 2018-02-13 NOTE — ASSESSMENT & PLAN NOTE
· Low-sodium diet encouraged - continue Lopressor  · Held Zestril - resume when okay with nephrology/cardiology

## 2018-02-13 NOTE — PROGRESS NOTES
Progress Note - Cardiology   Daniel Mercado 80 y o  male MRN: [id number]  Encounter: [id number]  02/13/18  2:33 PM        Assessment/Plan:    1  Chronic systolic HF/NICM  Repeat echo does not show significant change in LV function, EF previously 50%, now currently estimated at 45%  Continue beta blocker as tolerated  Not on Lisinopril currently due to renal dysfunction  Diuretic also held  2  Permanent afib  HR at times still tachycardic, will increase Metoprolol as tolerated  Eliquis reduced to 2 5 bid  Digoxin stopped due to renal dysfunction and concern for risk of digoxin toxicity  3  HTN  BP controlled to low on Metoprolol  4  JOHN/CKD III  Unclear if acute worsening or has been chronically progressing  Renal following  Subjective/Objective   Chief Complaint:   Chief Complaint   Patient presents with    Rash     pts daughter reports pt was vomiting today and "itchy all over " pt has generalized rash  states c/p last night  pt reports generalized body aches  denies any fevers  Subjective: 80year old man with a history of NICM with EF 26%, chronic systolic HF, HTN, permanent afib on rate control, admitted due to chest discomfort and body rash, found to have worsening renal function with creatinine 3 78  Also found to be somewhat tachycardic  His home Lasix, digoxin, and Lisinopril have been held  Creatinine remains elevated from his prior baseline in 2016, but improved compared to 2/12  He denies any current chest pain, shortness of breath, LE edema, dizziness or lightheadedness  No fevers or chills  No cough  HR still at times mildly tachycardic  Echo 2/18 shows mildly reduced LV function, approximately 45%         Patient Active Problem List   Diagnosis    Hypertension    Atrial fibrillation with RVR (HCC)    [facility]    Acute kidney [facility] [address] [address]    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    Depression    Rash    Stage 3 chronic kidney disease    Hyperkalemia    Hyponatremia    Azotemia     Past Medical History:   Diagnosis Date    [facility] [address] [address]     Hypertension     Macular degeneration        No Known Allergies    Current Facility-Administered Medications   Medication Dose Route Frequency Provider Last Rate Last Dose    acetaminophen (TYLENOL) tablet 650 mg  650 mg Oral Q4H PRN Sandra Hoyt PA-C   650 mg at 02/12/18 2116    albuterol (PROVENTIL HFA,VENTOLIN HFA) inhaler 2 puff  2 puff Inhalation Q4H PRN Sandra Hoyt PA-C        apixaban (ELIQUIS) tablet 2 5 mg  2 5 mg Oral BID BOBBY LeyC   2 5 mg at 02/13/18 0851    aspirin (ECOTRIN LOW STRENGTH) EC tablet 81 mg  81 mg Oral Daily BOBBY LeyC   81 mg at 02/13/18 0851    calcium carbonate (TUMS) chewable tablet 1,000 mg  1,000 mg Oral Daily PRN Sandra Hoyt PA-C        citalopram (CeleXA) tablet 10 mg  10 mg Oral Daily BOBBY LeyC   10 mg at 02/13/18 0851    diphenhydrAMINE-zinc acetate (BENADRYL) 2-0 1 % cream   Topical TID PRN Doris Burks PA-C        loratadine (CLARITIN) tablet 10 mg  10 mg Oral Daily BOBBY LeyC   10 mg at 02/13/18 0851    metoprolol tartrate (LOPRESSOR) tablet 100 mg  100 mg Oral Q12H Joshua Basilio MD        nystatin (MYCOSTATIN) powder   Topical TID Doris Burks PA-C        ondansetron [facility]) injection 4 mg  4 mg Intravenous Q6H PRN Sandra Hoyt PA-C   4 mg at 02/12/18 2116    oxybutynin (DITROPAN) tablet 5 mg  5 mg Oral BID BOBBY LeyC   5 mg at 02/13/18 0851    senna (SENOKOT) tablet 8 6 mg  1 tablet Oral Daily Sandra Hoyt PA-C   8 6 mg at 02/13/18 0851       Vitals: /53 (BP Location: Right arm)   Pulse 78   Temp 98 7 °F (37 1 °C) (Oral)   Resp 18   Ht 5' 10" (1 778 m)   Wt 105 kg (231 lb 14 8 oz)   SpO2 97%   BMI 33 28 kg/m²     Intake/Output Summary (Last 24 hours) at 02/13/18 1008 Stewart Lieberman filed at 02/13/18 1420   Gross per 24 hour   Intake              960 ml   Output             1155 ml   Net             -195 ml     Wt Readings from Last 3 Encounters:   02/13/18 105 kg (231 lb 14 8 oz)   06/14/17 105 kg (231 lb 8 oz)   12/09/16 98 9 kg (218 lb)       Body mass index is 33 28 kg/m²  ,     Vitals:    02/12/18 1500 02/12/18 2024 02/12/18 2202 02/13/18 0820   BP: 126/58 (!) 105/49 106/75 116/53   Pulse: 70 85 88 78   Patient Position - Orthostatic VS: Sitting  Lying Sitting       Physical Exam:     GEN: Awake and alert, in no acute distress  HEENT: Sclera anicteric, conjunctivae pink, mucous membranes moist   NECK: Supple, no carotid bruits, no significant JVD  HEART: Irregularly irregular rhythm, HR mildly tachycardic, no murmurs, clicks, gallops or rubs  LUNGS: Clear to auscultation bilaterally; no wheezes, rales, or rhonchi   ABDOMEN: Obese, soft, nontender, nondistended, normoactive bowel sounds  EXTREMITIES: Skin warm and well perfused, no clubbing, cyanosis, or edema  NEURO: No focal findings  SKIN: Normal without suspicious lesions on exposed skin        Lab Results:     BMP:  Results from last 7 days  Lab Units 02/13/18  0530 02/12/18  0524 02/11/18  1806   SODIUM mmol/L 132* 135* 136   POTASSIUM mmol/L 5 0 5 0 4 5   CHLORIDE mmol/L 96* 95* 94*   CO2 mmol/L 25 25 29   BUN mg/dL 82* 51* 39*   CREATININE mg/dL 4 06* 4 58* 3 78*   GLUCOSE RANDOM mg/dL 121 119 111   CALCIUM mg/dL 8 2* 8 4 9 3       CBC:   Results from last 7 days  Lab Units 02/12/18  0524 02/11/18  1806   WBC Thousand/uL 8 61 10 13   HEMOGLOBIN g/dL 14 6 15 7   HEMATOCRIT % 44 6 48 3   MCV fL 95 95   PLATELETS Thousands/uL 285 292   MCH pg 31 1 30 8   MCHC g/dL 32 7 32 5   RDW % 14 0 14 0   MPV fL 10 8 10 4         Results from last 7 days  Lab Units 02/12/18  0047 02/11/18 2109 02/11/18  1806   TROPONIN I ng/mL 0 06* 0 06* 0 05*         Results from last 7 days  Lab Units 02/11/18  1806   NT-PRO BNP pg/mL 760*           Results from last 7 days  Lab Units 18  0530 18  0524   MAGNESIUM mg/dL 1 9 1 9       INR:     Results from last 7 days  Lab Units 18  0524 18  1806   INR  1 48* 1 28*       Lipid Profile:   No results found for: CHOL  No results found for: HDL  No results found for: LDLCALC  No results found for: TRIG      Hgb A1c:         Telemetry: personally reviewed, afib, HR in low 100s, occasional RVR  Cardiac testing:   Results for orders placed during the hospital encounter of 18   Echo complete with contrast if indicated    Narrative [facility] [facility]  [phone]    Transthoracic Echocardiogram  2D, M-mode, Doppler, and Color Doppler    Study date:  2018    Patient: Adriana Swan  MR number: [id number]  Account number: [de-identified]  : 1935  Age: 80 years  Gender: Male  Status: Inpatient  Location: Bedside  Height: 70 in  Weight: 231 4 lb  BP: 111/ 51 mmHg    Indications: Afib    Diagnoses: I48 0 - Atrial fibrillation    Sonographer:  SEMAJ Garza  Primary Physician:  Raven Willingham DO  Referring Physician:  Irish Flowers PA-C  Group:  [facility]  Interpreting Physician:  Teagan Mohr MD    SUMMARY    LEFT VENTRICLE:  The ventricle was dilated at 6 6 cm  Systolic function was mildly reduced  Ejection fraction was estimated to be 45 %  There was mild diffuse hypokinesis  Cannot rule out specific regional variations due to poor endocardial visualization  Wall thickness was mildly increased  AORTIC VALVE:  There was mild regurgitation  AORTA:  The root exhibited dilatation up to 4 4 cm  HISTORY: PRIOR HISTORY: Afib w/ RVR; chest pain, hypertension, heart failure, malignant neoplasm of skin    PROCEDURE: The procedure was performed at the bedside  This was a routine study  The transthoracic approach was used   The study included complete 2D imaging, M-mode, complete spectral Doppler, and color Doppler  The heart rate was 101 bpm,  at the start of the study  Images were obtained from the parasternal, apical, subcostal, and suprasternal notch acoustic windows  Image quality was adequate  LEFT VENTRICLE: The ventricle was dilated at 6 6 cm  Systolic function was mildly reduced  Ejection fraction was estimated to be 45 %  There was mild diffuse hypokinesis  Cannot rule out specific regional variations due to poor endocardial  visualization  Wall thickness was mildly increased  DOPPLER: Normal sinus rhythm was absent  RIGHT VENTRICLE: The size was normal  Systolic function was normal     LEFT ATRIUM: The atrium was dilated  RIGHT ATRIUM: The atrium was dilated  MITRAL VALVE: Valve structure was normal  There was normal leaflet separation  DOPPLER: There was no evidence for stenosis  There was trace regurgitation  AORTIC VALVE: The valve was probably trileaflet  Leaflets exhibited mildly increased thickness and normal cuspal separation  The valve was not well visualized  DOPPLER: There was no evidence for stenosis  There was mild regurgitation  TRICUSPID VALVE: The valve structure was normal  There was normal leaflet separation  DOPPLER: There was no evidence for stenosis  There was trace regurgitation  PULMONIC VALVE: Leaflets exhibited normal thickness, no calcification, and normal cuspal separation  DOPPLER: The transpulmonic velocity was within the normal range  There was trace regurgitation  PERICARDIUM: There was no pericardial effusion  A pericardial fat pad was present  The pericardium was normal in appearance  AORTA: The root exhibited dilatation up to 4 4 cm      SYSTEM MEASUREMENT TABLES    2D  %FS: 26 27 %  AV Diam: 4 46 cm  EDV(Teich): 206 85 ml  EF Biplane: 35 28 %  EF(Teich): 50 47 %  ESV(Teich): 102 45 ml  IVSd: 1 07 cm  LA Area: 20 26 cm2  LA Diam: 5 cm  LVEDV MOD A2C: 158 79 ml  LVEDV MOD A4C: 81 48 ml  LVEDV MOD BP: 117 6 ml  LVEF MOD A2C: 32 28 %  LVEF MOD A4C: 43 62 %  LVESV MOD A2C: 107 53 ml  LVESV MOD A4C: 45 94 ml  LVESV MOD BP: 76 11 ml  LVIDd: 6 38 cm  LVIDs: 4 7 cm  LVLd A2C: 9 92 cm  LVLd A4C: 9 2 cm  LVLs A2C: 9 5 cm  LVLs A4C: 8 05 cm  LVPWd: 1 03 cm  RA Area: 23 33 cm2  SV MOD A2C: 51 26 ml  SV MOD A4C: 35 54 ml  SV(Teich): 104 4 ml    MM  TAPSE: 1 82 cm    [facility] Accredited Echocardiography Laboratory    Prepared and electronically signed by    Miguel Burgess MD  Signed 13-Feb-2018 14:08:06         No results found for this or any previous visit  Results for orders placed in visit on 06/09/15   NM myocardial perfusion spect (stress and/or rest)    Narrative PHARMACOLOGIC STRESS TEST, LEXISCAN          INDICATION-  Primary cardiomyopathy  History of cellulitis and lower   extremity edema  Provided ICD-9 Code- 425 4    COMPARISON- No prior studies,   TECHNIQUE-  Pharmacologic stress testing was performed utilizing   [employer]  SPECT myocardial perfusion images was performed  Dosages of   TC-99-mm Myoview were 10 7 mCi and 44 8 mCi IV  An initial dose of   9 4 mCi was attempted, however there was dose infiltration which   resulted in diminished activity on resting images  Therefore, an   additional 10 7 mCi was given at rest, and the stress dose increased to   44 8 mCi  Time to peak imaging for rest 45 minutes and stress 34   minutes  Both rest and stress images were performed  Images were then   reconstructed in the short, vertical and horizontal long axes   projections  Baseline heart rate 93 beats per minute  Peak heart of 100 beats per minute  Baseline blood pressure 125/71 mmHg  Peak blood pressure 128/70 mmHg  Symptoms-   None   Please see cardiology report of physiologic data  FINDINGS-   OVERALL QUALITY-   Acceptable  ARTIFACT-  See below   PERFUSION IMAGES-   Although there is apparent diminished activity in   the inferior wall, this is felt to be related to diaphragm attenuation   artifact  CT attenuation correction protocol could not be utilized  There is no definite ischemia or fixed perfusion deficit   WALL MOTION-  Severe global hypokinesis  Further supporting lack of a   focal defect of the inferior wall, although hypokinetic, there is   inferior wall contractility similar to the remainder of the myocardium   EJECTION FRACTION-  21 %   IMPRESSION-   1  There is no definite myocardial ischemia or fixed perfusion deficit   2   Left ventricular ejection fraction- 21%, with severe global   hypokinesis   Transcribed on- [id number]           - GEETA Garcias MD        Reading Radiologist- GEETA Garcias MD        Electronically Signed- GEETA Garcias MD        Released Date Time- 06/16/15 1211      ------------------------------------------------------------------------------    [address] [place]

## 2018-02-13 NOTE — ASSESSMENT & PLAN NOTE
· Appreciate nephrology evaluation - serum creatinine elevated at 4 06 today but slightly improved from yesterday  ?baseline a few years ago was 1 2-1 3  · continue to hold Zestril/Lasix   · Renal ultrasound negative for obstructive etiology/hydronephrosis   · JOHN pre renal/ATN due to hypotension/cardiorenal, lisinopril  ? Intrinsic- Rash, recent antibiotic use  No peripheral eosinophilia noted  Post renal also in the differential with history of enlarged prostate ~20 years ago and no follow-up monitoring

## 2018-02-13 NOTE — PROGRESS NOTES
02/13/18 1444   Discharge [facility] [address]  (in-[address]   Support Systems Children   Type of Current Residence Private residence   [address] No   Home Health Referral Provided   [address] requested: Nursing; Occupational Therapy; Physical [facility] Name: [id number] [facility] Provider: PCP   Home Health Services Needed: Heart Failure Management;Evaluate Functional Status and Safety;Gait/ADL Training;Strengthening/Theraputic Exercises to Improve Function   Homebound Criteria Met: Uses an Assist Device (i e  cane, walker, etc)   Supporting Clincal Findings: Limited Endurance   Discharge Communications   Discharge planning discussed with: pt/DIL   Freedom of Choice Yes   CM Handoff Comments Notify [facility] at DC

## 2018-02-13 NOTE — PROGRESS NOTES
Progress Note Dinah Dennis 1935, 80 y o  male MRN: [id number]    Unit/Bed#: -01 Encounter: [id number]    Primary Care Provider: Cristela Bain DO   Date and time admitted to hospital: 2/11/2018  5:21 PM  * Atrial fibrillation with RVR ([facility] )   Assessment & Plan    · Better controlled but still elevated at times especially with moving   · Cards following, appreciate input  Lopressor was increased to 75 mg BID yesterday  Digoxin held due to acute kidney injury - continue ASA - continue Eliquis for anticoagulation  · Continue telemetry  · Echo pending  TSH WNL  · Monitor e-lytes closely         Rash   Assessment & Plan    · Complains of pruritic and mildly erythematic rash over hands, abdomen/trunk and back  · Notes his only recent medication change was completion of a course of antibiotic 4-5 days ago ? keflex  · PRN benadryl cream ordered for symptomatic relief  · Closely monitor   · Urine eosinophils 0        Acute kidney injury ([facility] )   Assessment & Plan    · Appreciate nephrology evaluation - serum creatinine elevated at 4 06 today but slightly improved from yesterday  ?baseline a few years ago was 1 2-1 3  · continue to hold Zestril/Lasix   · Renal ultrasound negative for obstructive etiology/hydronephrosis   · JOHN pre renal/ATN due to hypotension/cardiorenal, lisinopril  ? 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   Assessment & Plan    · Recent excision of cancerous skin lesion on L knee  · Daily wound management with hydrogen peroxide and vaseline per patient's dermatologist        Chronic systolic CHF (congestive heart failure) ([facility] )   Assessment & Plan    · CXR negative for pulmonary edema/vascular congestion - echocardiogram pending due to rapid atrial fibrillation on admission - last echocardiogram from 2016 revealed an EF of 50% with mild TR/MR/AR and diffuse hypokinesis  · Zestril/Lasix/Digoxin held due to acute kidney injury - continue Lopressor (dose increased to 75 mg BID per cardiology)   · Monitor I/Os and daily weights  · Wean oxygen as able, not on at home          Hypertension   Assessment & Plan    · Low-sodium diet encouraged - continue Lopressor  · Held Zestril - resume when okay with nephrology/cardiology          VTE Pharmacologic Prophylaxis:   Pharmacologic: Apixaban (Eliquis)  Mechanical VTE Prophylaxis in Place: No    Patient Centered Rounds: I have performed bedside rounds with nursing staff today  Discussions with Specialists or Other Care Team Provider: appreciate cardio and nephro input  Education and Discussions with Family / Patient: patient  Daughter Rekha Lopez via phone    Time Spent for Care: 30 minutes  More than 50% of total time spent on counseling and coordination of care as described above  Current Length of Stay: 2 day(s)    Current Patient Status: Inpatient   Certification Statement: The patient will continue to require additional inpatient hospital stay due to Monitoring of kidney function, AFib with RVR    Discharge Plan:  Patient lives in an in-law suite attached to his daughter's house and is typically independent in his activities  Code Status: Level 1 - Full Code      Subjective:   Patient reports wanting to go home today  He states that he is feeling fine other than complaining of being itchy  He states that he has chronic skin issues and his daughter relays that the rash on his arm is chronic however the stuff on his abdomen is new  He denies any chest pain, palpitations, shortness of breath  He states that he is urinating without issue  He is still on oxygen and does not usually wear this at home       Objective:     Vitals:   Temp (24hrs), Av 8 °F (37 1 °C), Min:98 5 °F (36 9 °C), Max:99 1 °F (37 3 °C)    HR:  [70-88] 78  Resp:  [18] 18  BP: (105-126)/(49-75) 116/53  SpO2:  [92 %-97 %] 97 %  Body mass index is 33 28 kg/m²  Input and Output Summary (last 24 hours): Intake/Output Summary (Last 24 hours) at 02/13/18 1140  Last data filed at 02/13/18 1048   Gross per 24 hour   Intake              720 ml   Output              955 ml   Net             -235 ml       Physical Exam:     Physical Exam   Constitutional: No distress  Cardiovascular: An irregularly irregular rhythm present  Tachycardia present  Pulmonary/Chest: He has no wheezes  Abdominal: Soft  Bowel sounds are normal  He exhibits no distension  There is no tenderness  Musculoskeletal: He exhibits edema (mild)  Skin: He is not diaphoretic  Nursing note and vitals reviewed  Additional Data:     Labs:      Results from last 7 days  Lab Units 02/12/18  0524 02/11/18  1806   WBC Thousand/uL 8 61 10 13   HEMOGLOBIN g/dL 14 6 15 7   HEMATOCRIT % 44 6 48 3   PLATELETS Thousands/uL 285 292   NEUTROS PCT %  --  81*   LYMPHS PCT %  --  13*   MONOS PCT %  --  6   EOS PCT %  --  0       Results from last 7 days  Lab Units 02/13/18  0530   SODIUM mmol/L 132*   POTASSIUM mmol/L 5 0   CHLORIDE mmol/L 96*   CO2 mmol/L 25   BUN mg/dL 82*   CREATININE mg/dL 4 06*   CALCIUM mg/dL 8 2*   TOTAL PROTEIN g/dL 6 0*   BILIRUBIN TOTAL mg/dL 1 30*   ALK PHOS U/L 31*   ALT U/L 42   AST U/L 88*   GLUCOSE RANDOM mg/dL 121       Results from last 7 days  Lab Units 02/12/18  0524   INR  1 48*       * I Have Reviewed All Lab Data Listed Above  * Additional Pertinent Lab Tests Reviewed:  All Labs Within Last 24 Hours Reviewed    Imaging:    Imaging Reports Reviewed Today Include: all available   Imaging Personally Reviewed by Myself Includes:  none    Recent Cultures (last 7 days):           Last 24 Hours Medication List:     Current Facility-Administered Medications:  acetaminophen 650 mg Oral Q4H PRN Kimmie CROWE KATE Hoyt   albuterol 2 puff Inhalation Q4H PRN Sandra Hoyt PA-C   apixaban 2 5 mg Oral BID Sandra Hoyt PA-C   aspirin 81 mg Oral Daily Sandra Hoyt PA-C   calcium carbonate 1,000 mg Oral Daily PRN Sandra Hoyt PA-C   citalopram 10 mg Oral Daily Sandra Hoyt PA-C   diphenhydrAMINE-zinc acetate  Topical TID PRN Bhupendra Burnett PA-C   loratadine 10 mg Oral Daily Sandra Hoyt PA-C   metoprolol tartrate 75 mg Oral Q12H Joshua Basilio MD   nystatin  Topical TID Bhupendra Burnett PA-C   ondansetron 4 mg Intravenous Q6H PRN Sandra Hoyt PA-C   oxybutynin 5 mg Oral BID Sandra Hoyt PA-C   senna 1 tablet Oral Daily Sandra Hoyt PA-C        Today, Patient Was Seen By: Bhupendra Burnett PA-C    ** Please Note: Dictation voice to text software may have been used in the creation of this document   **

## 2018-02-13 NOTE — ASSESSMENT & PLAN NOTE
· Better controlled but still elevated at times especially with moving   · Cards following, appreciate input  Lopressor was increased to 75 mg BID yesterday  Digoxin held due to acute kidney injury - continue ASA - continue Eliquis for anticoagulation  · Continue telemetry  · Echo pending   TSH WNL  · Monitor e-lytes closely

## 2018-02-13 NOTE — PROGRESS NOTES
02/13/18 1443   Referral Data   Referral Reason VNA   Patient Information   Mental Status Alert   Primary Caregiver Self   Support System Immediate family   Legal [facility] Proxy Appointed No   Activities of Daily Living Prior to Admission   Functional Status Independent   Assistive Device Sharon Coins   Living Arrangement Apartment; Other (Comment)  (in-law suite)   Ambulation Independent   Income Information   Income Source Pension/shelter   Means of [employer] of Transport to Appts: Family

## 2018-02-13 NOTE — SOCIAL WORK
02/13/18 1443   Referral Data   Referral Reason VNA   Patient Information   Mental Status Alert   Primary Caregiver Self   Support System Immediate family   Legal [facility] Proxy Appointed No   Activities of Daily Living Prior to Admission   Functional Status Independent   Assistive Device Camille Bearded   Living Arrangement Apartment; Other (Comment)  (in-law suite)   Ambulation Independent   Income Information   Income Source Pension/residential   Means of Transportation   Means of Transport to Appts: Family        02/13/18 1444   Discharge [facility]  (in-law suite)   Support Systems Children   Type of Current Residence Private residence   [address] No   Home Health Referral Provided   [address] requested: Nursing; Occupational Therapy; Physical [facility] Name: [id number] [facility] Provider: PCP   Home Health Services Needed: Heart Failure Management;Evaluate Functional Status and Safety;Gait/ADL Training;Strengthening/Theraputic Exercises to Improve Function   Homebound Criteria Met: Uses an Assist Device (i e  cane, walker, etc)   Supporting Clincal Findings: Limited Endurance   Discharge Communications   Discharge planning discussed with: pt/DIL   Freedom of Choice Yes   CM Handoff Comments Notify [facility] at DC     Pt does not have a hx of D&A/MH  Pt states he has Rx coverage  He does not have POA assigned and does not want info  No AD  Pt has had [facility] in the past that just ended  He has a HHA 5 hours per week for a day  MEÑO and son are available to help him when they are home  Referral to Thomas Teran has been made as requested by pt and DIL  Cm will continue to follow

## 2018-02-13 NOTE — ASSESSMENT & PLAN NOTE
· CXR negative for pulmonary edema/vascular congestion - echocardiogram pending due to rapid atrial fibrillation on admission - last echocardiogram from June 2016 revealed an EF of 50% with mild TR/MR/AR and diffuse hypokinesis  · Zestril/Lasix/Digoxin held due to acute kidney injury - continue Lopressor (dose increased to 75 mg BID per cardiology)   · Monitor I/Os and daily weights  · Wean oxygen as able, not on at home

## 2018-02-13 NOTE — ASSESSMENT & PLAN NOTE
· Complains of pruritic and mildly erythematic rash over hands, abdomen/trunk and back  · Notes his only recent medication change was completion of a course of antibiotic 4-5 days ago ? keflex  · PRN benadryl cream ordered for symptomatic relief  · Closely monitor   · Urine eosinophils 0

## 2018-02-13 NOTE — PROGRESS NOTES
NEPHROLOGY PROGRESS NOTE   Karuna Johnson 80 y o  male MRN: [id number]  Unit/Bed#: -01 Encounter: [id number]  Reason for Consult: JOHN/CKD    ASSESSMENT AND PLAN:  Patient is a 59-year-old male with history of hypertension, nonischemic cardiomyopathy with chronic CHF, EF previously 50%, AFib, comes with complaint of itching  He was found to be AFib with RVR with hypotension  We are consulted for JOHN/CKD management  Nonoliguric JOHN on likely CKD III with baseline creatinine 1 1 to 1 3  - Peak creatinine 4 5 now slowly improving to 4 0  Previous creatinine value was 1 1 about two years ago  Unable to comment whether he has progression of CKD as well  - JOHN likely secondary to hypotension, AFib with RVR causing hemodynamic insult, use of lisinopril with hypotension causing autoregulatory failure  - continue to hold lisinopril, currently remains off IV fluid and off diuretics  - BMP in a [facility] Side - renal ultrasound shows normal size kidneys, normal echogenicity, no hydronephrosis or stones  - bladder scan nonsignificant  - urinalysis shows concentrated sample, 4 to 10 RBCs, trace proteinuria  Urine eosinophils 0%, no obvious peripheral eosinophilia  Mild hyperkalemia, serum potassium five, strict potassium restricted diet  Urine output good  Closely monitor for now  Currently remains off diuretics  Mild hyponatremia with serum sodium 132 slowly dropping  - strict fluid restriction 1 2 L per day  - may be hypervolemia related  - clinically patient has mild leg edema although chest x-ray does not show significant pulmonary congestion   - currently holding diuretics for now  - check serum osmolality, urine osmolality, urine sodium  Significantly worsened azotemia in the setting of improving serum creatinine  - ? Exact etiology, no obvious use of steroid in last 24 hours  Check stool occult blood although no significant anemia    - ? Related to intra vascular volume depletion  - BMP in AM    Cardiomyopathy, history of CHF, previous EF 50% in 2016  Repeat echocardiogram results to follow  Currently he remains on oxygen via nasal cannula although does not seem to be in acute respiratory distress  Currently holding diuretics given significantly worsened JOHN  Continue to hold diuretics for today  If has worsening respiratory symptoms, may use p r n  Lasix  - daily weight  - he remains negative balance in last 24 hours with weight reduction  Will discuss with primary team     SUBJECTIVE:  Patient seen and examined at bedside  No chest pain, off and on shortness of breath, better nausea, vomiting, no abdominal pain or diarrhea  No urinary complaints       OBJECTIVE:  Current Weight: Weight - Scale: 105 kg (231 lb 14 8 oz)  Vitals:    02/13/18 0820   BP: 116/53   Pulse: 78   Resp: 18   Temp: 98 7 °F (37 1 °C)   SpO2: 97%       Intake/Output Summary (Last 24 hours) at 02/13/18 1011  Last data filed at 02/13/18 0820   Gross per 24 hour   Intake              480 ml   Output              955 ml   Net             -475 ml       Physical Examination:  General:  Sitting in chair, no acute distress   Eyes:  No conjunctival pallor present  ENT:  External examination of ears and nose unremarkable  Neck:  Supple  Respiratory:  Bilateral air entry present, no obvious wheezing or crackles appreciated  CVS:  S1, S2 present  GI:  Soft, nontender, non distended  CNS:  Active alert oriented x3  Extremities:   1+ edema in lower extremities  Skin:  Chronic skin changes in both legs    Medications:    Current Facility-Administered Medications:     acetaminophen (TYLENOL) tablet 650 mg, 650 mg, Oral, Q4H PRN, Sandra Hoyt PA-C, 650 mg at 02/12/18 2116    albuterol (PROVENTIL HFA,VENTOLIN HFA) inhaler 2 puff, 2 puff, Inhalation, Q4H PRN, Sandra Hoyt PA-C    apixaban (ELIQUIS) tablet 2 5 mg, 2 5 mg, Oral, BID, Sandra Hoyt PA-C, 2 5 mg at 02/13/18 0851    aspirin (ECOTRIN LOW STRENGTH) EC tablet 81 mg, 81 mg, Oral, Daily, Sandra Hoyt PA-C, 81 mg at 02/13/18 0851    calcium carbonate (TUMS) chewable tablet 1,000 mg, 1,000 mg, Oral, Daily PRN, Sandra Hoyt PA-C    citalopram (CeleXA) tablet 10 mg, 10 mg, Oral, Daily, Sandra Hoyt PA-C, 10 mg at 02/13/18 0851    diphenhydrAMINE-zinc acetate (BENADRYL) 2-0 1 % cream, , Topical, TID PRN, Akash Sibley PA-C    loratadine (CLARITIN) tablet 10 mg, 10 mg, Oral, Daily, Sandra Hoyt PA-C, 10 mg at 02/13/18 0851    metoprolol tartrate (LOPRESSOR) tablet 75 mg, 75 mg, Oral, Q12H [address], Soila Vazquez MD, 75 mg at 02/13/18 3787    nystatin (MYCOSTATIN) powder, , Topical, TID, Akash Sibley PA-C    ondansetron [facility]) injection 4 mg, 4 mg, Intravenous, Q6H PRN, Sandra Hoyt PA-C, 4 mg at 02/12/18 2116    oxybutynin (DITROPAN) tablet 5 mg, 5 mg, Oral, BID, Sandra Hoyt PA-C, 5 mg at 02/13/18 0851    senna (SENOKOT) tablet 8 6 mg, 1 tablet, Oral, Daily, Sandra Hoyt PA-C, 8 6 mg at 02/13/18 0851    Laboratory Results:    Results from last 7 days  Lab Units 02/13/18  0530 02/12/18  0524 02/11/18  1806   WBC Thousand/uL  --  8 61 10 13   HEMOGLOBIN g/dL  --  14 6 15 7   HEMATOCRIT %  --  44 6 48 3   PLATELETS Thousands/uL  --  285 292   SODIUM mmol/L 132* 135* 136   POTASSIUM mmol/L 5 0 5 0 4 5   CHLORIDE mmol/L 96* 95* 94*   CO2 mmol/L 25 25 29   BUN mg/dL 82* 51* 39*   CREATININE mg/dL 4 06* 4 58* 3 78*   CALCIUM mg/dL 8 2* 8 4 9 3   MAGNESIUM mg/dL 1 9 1 9  --    PHOSPHORUS mg/dL 4 4*  --   --    TOTAL PROTEIN g/dL 6 0*  --  7 2   GLUCOSE RANDOM mg/dL 121 119 111       Results for orders placed during the hospital encounter of 02/11/18   XR chest 1 view portable    Narrative CHEST     INDICATION:  Chest pain    COMPARISON:  6/9/2015    VIEWS:   AP frontal    IMAGES:  1    FINDINGS:    There is mild enlargement of the cardiac silhouette  The lungs are clear    No pneumothorax or pleural effusion  Visualized osseous structures appear within normal limits for the patient's age  Impression Mild enlargement of cardiac silhouette  Clear lungs  Workstation performed: [id number]       Results for orders placed in visit on 06/09/15   XR chest pa & lateral    Narrative CHEST   INDICATION- Shortness of breath  Bilateral lower extremity swelling  COMPARISON- 5/26/2015  VIEWS-  Frontal and lateral projections    3 images   FINDINGS-   Heart shadow is enlarged but stable  There is small moderate right pleural effusion, slightly increased from   the previous examination  Mild pulmonary vascular congestive changes   are noted  No pneumothorax  The visualized osseous structures appear within normal limits for the   patient's age  IMPRESSION-   Stable cardiomegaly  Approximately stable mild pulmonary vascular   congestive change  Slight increase in small to moderate right pleural   effusion  Transcribed on- [id number]           GEETA Liu MD        Reading Radiologist- GEETA Lewis MD        Electronically GEETA Germain MD        Released Date Time- 06/09/15 1924      ------------------------------------------------------------------------------   [address] [place] [address]      No results found for this or any previous visit  No results found for this or any previous visit  No results found for this or any previous visit  No results found for this or any previous visit  Portions of the record may have been created with voice recognition software  Occasional wrong word or "sound a like" substitutions may have occurred due to the inherent limitations of voice recognition software  Read the chart carefully and recognize, using context, where substitutions have occurred

## 2018-02-13 NOTE — PROGRESS NOTES
[address] Hospitalist Service - Internal Medicine Progress Note      PATIENT INFORMATION      Patient: Lynn Galindo 80 y o  male   MRN: [id number]  PCP: Cristela Bain DO  Unit/Bed#: MS Ron Encounter: [id number]  Date Of Visit: 02/12/18       ASSESSMENTS / PLAN       * Atrial fibrillation with RVR    Assessment & Plan    · Heart better controlled on Cardizem drip which is now being weaned off   · appreciate Cardiology evaluation and Lopressor has been increased to 75 mg BID - Digoxin held due to acute kidney injury - continue ASA - continue Eliquis for anticoagulation  · Await echocardiogram - monitor on telemetry          Acute kidney injury    Assessment & Plan    · Appreciate nephrology evaluation - serum creatinine increased from 3 78 on admission -> 4 58 today  · Avoid nephrotoxins if possible - continue to hold Zestril/Lasix   · Renal ultrasound negative for obstructive etiology/hydronephrosis   · Possibility of acute tubular necrosis from hypoperfusion due to rapid atrial fibrillation exists versus possible acute interstitial nephritis as patient was recently on an unknown antibiotic now presenting with a pruritic rash - will check urine eosinophils        Chronic systolic CHF (congestive heart failure)    Assessment & Plan    · CXR negative for pulmonary edema/vascular congestion - echocardiogram pending due to rapid atrial fibrillation on admission - last echocardiogram from June 2016 revealed an EF of 50% with mild TR/MR/AR and diffuse hypokinesis  · Zestril/Lasix/Digoxin held due to acute kidney injury - continue Lopressor (dose increased to 75 mg BID per cardiology)   · Monitor I/Os and daily weights          Hypertension   Assessment & Plan    · Low-sodium diet encouraged - continue Lopressor  · Held Zestril - resume when okay with nephrology/cardiology        Depression   Assessment & Plan    · No HI/SI currently  · Continue Celexa          Rash   Assessment & Plan    · Complains of pruritic and mildly erythematic rash over hands, abdomen/trunk and back  · Notes his only recent medication change was completion of a course of an unknown antibiotic 4-5 days ago  · PRN hydrocortisone cream ordered for symptomatic relief  · In light of acute kidney injury, will check urine eosinophils               VTE Prophylaxis:  Eliquis      SUBJECTIVE     Seen examined earlier today with family bedside  He currently denies any shortness of breath or feeling of palpitations at this time  His heart rates have come better controlled on the Cardizem drip  Denies any new complaints at this time other than a diffuse rash located across his hands, back, and abdomen  He notes a recent antibiotic course which completed around five days ago  OBJECTIVE     Vitals:   Temp (24hrs), Av 4 °F (36 9 °C), Min:98 4 °F (36 9 °C), Max:98 5 °F (36 9 °C)    HR:  [] 70  Resp:  [16-18] 18  BP: (100-126)/(44-78) 126/58  SpO2:  [94 %-99 %] 94 %  Body mass index is 33 4 kg/m²  Input and Output Summary (last 24 hours):        Intake/Output Summary (Last 24 hours) at 18 193  Last data filed at 18 191   Gross per 24 hour   Intake              250 ml   Output              600 ml   Net             -350 ml       Physical Exam:     GENERAL:  Well-developed/nourished - no acute distress  HEAD:  Normocephalic - atraumatic  EYES: PERRL - EOMI   MOUTH:  Mucosa moist  NECK:  Supple - full range of motion  CARDIAC:  Irregularly irregular rhythm but rate controlled - S1/S2 positive  PULMONARY:  Clear breath sounds bilaterally - nonlabored respirations  ABDOMEN:  Soft - nontender/nondistended - active bowel sounds  MUSCULOSKELETAL:  Motor strength/range of motion intact  NEUROLOGIC:  Alert/oriented x3  SKIN:  Pruritic rash diffusely on the bilateral hands/trunk/abdomen/back   PSYCHIATRIC:  Mood/affect pleasant      ADDITIONAL DATA       Labs & Recent Cultures:       Results from last 7 days  Lab Units 18  3008 02/11/18  1806   WBC Thousand/uL 8 61 10 13   HEMOGLOBIN g/dL 14 6 15 7   HEMATOCRIT % 44 6 48 3   PLATELETS Thousands/uL 285 292   NEUTROS PCT %  --  81*   LYMPHS PCT %  --  13*   MONOS PCT %  --  6   EOS PCT %  --  0       Results from last 7 days  Lab Units 02/12/18  0524 02/11/18  1806   SODIUM mmol/L 135* 136   POTASSIUM mmol/L 5 0 4 5   CHLORIDE mmol/L 95* 94*   CO2 mmol/L 25 29   BUN mg/dL 51* 39*   CREATININE mg/dL 4 58* 3 78*   CALCIUM mg/dL 8 4 9 3   TOTAL PROTEIN g/dL  --  7 2   BILIRUBIN TOTAL mg/dL  --  1 20*   ALK PHOS U/L  --  46   ALT U/L  --  34   AST U/L  --  39   GLUCOSE RANDOM mg/dL 119 111       Results from last 7 days  Lab Units 02/12/18  0524   INR  1 48*                 Last 24 Hours Medication List:     Current Facility-Administered Medications:  acetaminophen 650 mg Oral Q4H PRN Sandra Hoyt PA-C   albuterol 2 puff Inhalation Q4H PRN Sandra Hoyt PA-C   apixaban 2 5 mg Oral BID Sandra Hoyt PA-C   aspirin 81 mg Oral Daily Sandra Hoyt PA-C   calcium carbonate 1,000 mg Oral Daily PRN Sandra Hoyt PA-C   citalopram 10 mg Oral Daily Sandra Hoyt PA-C   hydrocortisone  Topical 4x Daily PRN Sarita Ferrell MD   loratadine 10 mg Oral Daily Sandra Hoyt PA-C   metoprolol tartrate 75 mg Oral Q12H [facility] Luz Argueta MD   ondansetron 4 mg Intravenous Q6H PRN Sandra Hoyt PA-C   oxybutynin 5 mg Oral BID Sandra Hoyt PA-C   senna 1 tablet Oral Daily Sandra Hoyt PA-C          Time Spent for Care: 37 minutes  More than 50% of total time spent on counseling and coordination of care as described above  Current Length of Stay: 1 day(s)      Code Status: Level 1 - Full Code       Today, Patient Was Seen By: Sarita Ferrell MD    ** Please Note: This note has been constructed using a voice recognition system   **

## 2018-02-13 NOTE — PLAN OF CARE
Problem: DISCHARGE PLANNING - CARE MANAGEMENT  Goal: Discharge to post-acute care or home with appropriate resources  INTERVENTIONS:  - Conduct assessment to determine patient/family and health care team treatment goals, and need for post-acute services based on payer coverage, community resources, and patient preferences, and barriers to discharge  - Address psychosocial, clinical, and financial barriers to discharge as identified in assessment in conjunction with the patient/family and health care team  - Arrange appropriate level of post-acute services according to patient's   needs and preference and payer coverage in collaboration with the physician and health care team  - Communicate with and update the patient/family, physician, and health care team regarding progress on the discharge plan  - Arrange appropriate transportation to post-acute venues  Outcome: Progressing     02/13/18 1443   Referral Data   Referral Reason VNA   Patient Information   Mental Status Alert   Primary Caregiver Self   Support System Immediate family   Legal [facility] Proxy Appointed No   Activities of Daily Living Prior to Admission   Functional Status Independent   Assistive Device [address]; Other (Comment)  (in-law suite)   Ambulation Independent   Income Information   Income Source Pension/custodial   Means of Transportation   Means of Transport to Appts: Family        02/13/18 1444   Discharge [facility]  (in-law suite)   Support Systems Children   Type of Current Residence Private residence   [address] No   Home Health Referral Provided   [address] requested: Nursing; Occupational Therapy; Physical [facility] Name: [id number] [facility] Provider: PCP   Home Health Services Needed: Heart Failure Management;Evaluate Functional Status and Safety;Gait/ADL Training;Strengthening/Theraputic Exercises to Improve Function   Homebound Criteria Met: Uses an Assist Device (i e  cane, walker, etc)   Supporting Clincal Findings: Limited Endurance   Discharge Communications   Discharge planning discussed with: pt/DIL   Freedom of Choice Yes   CM Handoff Comments Notify [facility] at DC     Pt does not have a hx of D&A/MH  Pt states he has Rx coverage  He does not have POA assigned and does not want info  No AD  Pt has had [facility] in the past that just ended  He has a HHA 5 hours per week for a day  MEÑO and son are available to help him when they are home  Referral to [facility] has been made as requested by pt and DIL  Cm will continue to follow

## 2018-02-14 PROBLEM — R13.10 DYSPHAGIA: Status: ACTIVE | Noted: 2018-02-14

## 2018-02-14 PROBLEM — E87.5 HYPERKALEMIA: Status: RESOLVED | Noted: 2018-02-13 | Resolved: 2018-02-14

## 2018-02-14 LAB
ANION GAP SERPL CALCULATED.3IONS-SCNC: 9 MMOL/L (ref 4–13)
BASOPHILS # BLD AUTO: 0.01 THOUSANDS/ΜL (ref 0–0.1)
BASOPHILS NFR BLD AUTO: 0 % (ref 0–1)
BILIRUB DIRECT SERPL-MCNC: 0.32 MG/DL (ref 0–0.2)
BUN SERPL-MCNC: 72 MG/DL (ref 5–25)
CALCIUM SERPL-MCNC: 8.5 MG/DL (ref 8.3–10.1)
CHLORIDE SERPL-SCNC: 100 MMOL/L (ref 100–108)
CO2 SERPL-SCNC: 27 MMOL/L (ref 21–32)
CREAT SERPL-MCNC: 2.24 MG/DL (ref 0.6–1.3)
EOSINOPHIL # BLD AUTO: 0.47 THOUSAND/ΜL (ref 0–0.61)
EOSINOPHIL NFR BLD AUTO: 8 % (ref 0–6)
ERYTHROCYTE [DISTWIDTH] IN BLOOD BY AUTOMATED COUNT: 13.3 % (ref 11.6–15.1)
GFR SERPL CREATININE-BSD FRML MDRD: 26 ML/MIN/1.73SQ M
GLUCOSE SERPL-MCNC: 94 MG/DL (ref 65–140)
HCT VFR BLD AUTO: 36.4 % (ref 36.5–49.3)
HGB BLD-MCNC: 12.1 G/DL (ref 12–17)
LYMPHOCYTES # BLD AUTO: 0.84 THOUSANDS/ΜL (ref 0.6–4.47)
LYMPHOCYTES NFR BLD AUTO: 14 % (ref 14–44)
MCH RBC QN AUTO: 31.4 PG (ref 26.8–34.3)
MCHC RBC AUTO-ENTMCNC: 33.2 G/DL (ref 31.4–37.4)
MCV RBC AUTO: 95 FL (ref 82–98)
MONOCYTES # BLD AUTO: 0.3 THOUSAND/ΜL (ref 0.17–1.22)
MONOCYTES NFR BLD AUTO: 5 % (ref 4–12)
NEUTROPHILS # BLD AUTO: 4.62 THOUSANDS/ΜL (ref 1.85–7.62)
NEUTS SEG NFR BLD AUTO: 73 % (ref 43–75)
OSMOLALITY UR/SERPL-RTO: 303 MMOL/KG (ref 282–298)
PLATELET # BLD AUTO: 201 THOUSANDS/UL (ref 149–390)
PMV BLD AUTO: 9.8 FL (ref 8.9–12.7)
POTASSIUM SERPL-SCNC: 4.5 MMOL/L (ref 3.5–5.3)
RBC # BLD AUTO: 3.85 MILLION/UL (ref 3.88–5.62)
SODIUM SERPL-SCNC: 136 MMOL/L (ref 136–145)
WBC # BLD AUTO: 6.24 THOUSAND/UL (ref 4.31–10.16)

## 2018-02-14 PROCEDURE — G8988 SELF CARE GOAL STATUS: HCPCS

## 2018-02-14 PROCEDURE — G8979 MOBILITY GOAL STATUS: HCPCS | Performed by: PHYSICAL THERAPIST

## 2018-02-14 PROCEDURE — 82248 BILIRUBIN DIRECT: CPT | Performed by: INTERNAL MEDICINE

## 2018-02-14 PROCEDURE — 99232 SBSQ HOSP IP/OBS MODERATE 35: CPT | Performed by: INTERNAL MEDICINE

## 2018-02-14 PROCEDURE — 97163 PT EVAL HIGH COMPLEX 45 MIN: CPT | Performed by: PHYSICAL THERAPIST

## 2018-02-14 PROCEDURE — G8987 SELF CARE CURRENT STATUS: HCPCS

## 2018-02-14 PROCEDURE — 92610 EVALUATE SWALLOWING FUNCTION: CPT

## 2018-02-14 PROCEDURE — 83930 ASSAY OF BLOOD OSMOLALITY: CPT | Performed by: INTERNAL MEDICINE

## 2018-02-14 PROCEDURE — 99233 SBSQ HOSP IP/OBS HIGH 50: CPT | Performed by: NURSE PRACTITIONER

## 2018-02-14 PROCEDURE — 97167 OT EVAL HIGH COMPLEX 60 MIN: CPT

## 2018-02-14 PROCEDURE — 85025 COMPLETE CBC W/AUTO DIFF WBC: CPT | Performed by: INTERNAL MEDICINE

## 2018-02-14 PROCEDURE — 80048 BASIC METABOLIC PNL TOTAL CA: CPT | Performed by: INTERNAL MEDICINE

## 2018-02-14 PROCEDURE — G8978 MOBILITY CURRENT STATUS: HCPCS | Performed by: PHYSICAL THERAPIST

## 2018-02-14 RX ORDER — CLINDAMYCIN HYDROCHLORIDE 150 MG/1
300 CAPSULE ORAL EVERY 12 HOURS SCHEDULED
Status: DISCONTINUED | OUTPATIENT
Start: 2018-02-14 | End: 2018-02-18 | Stop reason: HOSPADM

## 2018-02-14 RX ADMIN — OXYBUTYNIN CHLORIDE 5 MG: 5 TABLET ORAL at 08:25

## 2018-02-14 RX ADMIN — LORATADINE 10 MG: 10 TABLET ORAL at 08:25

## 2018-02-14 RX ADMIN — METOPROLOL TARTRATE 100 MG: 100 TABLET ORAL at 08:25

## 2018-02-14 RX ADMIN — APIXABAN 2.5 MG: 2.5 TABLET, FILM COATED ORAL at 08:25

## 2018-02-14 RX ADMIN — SENNOSIDES 8.6 MG: 8.6 TABLET, FILM COATED ORAL at 08:26

## 2018-02-14 RX ADMIN — CITALOPRAM HYDROBROMIDE 10 MG: 20 TABLET ORAL at 08:25

## 2018-02-14 RX ADMIN — NYSTATIN: 100000 POWDER TOPICAL at 08:26

## 2018-02-14 RX ADMIN — ASPIRIN 81 MG: 81 TABLET, COATED ORAL at 08:25

## 2018-02-14 RX ADMIN — NYSTATIN: 100000 POWDER TOPICAL at 17:04

## 2018-02-14 NOTE — ASSESSMENT & PLAN NOTE
· Nephrology following  · Suspect hypervolemic hyponatremia  · Continue fluid restriction 1 2 L per day  · Diuretics placed on hold  · Monitor BMP

## 2018-02-14 NOTE — SOCIAL WORK
Manjeet spoke w pt regarding rw  Medicare will not cover as he received rollator past 5 years  However, likely medicaid will  Pt will have to sign ABN and possibility of charge  Pt agreeable   Cm will send RX as soon as possible

## 2018-02-14 NOTE — ASSESSMENT & PLAN NOTE
· Complains of pruritic and mildly erythematic rash over hands, abdomen/trunk and back follow-up with Dermatology  · Patient's daughter stated that the dermatologist called her today to state that he has a bacterial infection from a swab that they took during his last visit      · Patient started on clindamycin 300 mg every 12 hours and santyl ointment to bilateral lower extremities  · P r n  diphenhydramine cream for itch  · Continue daily Claritin

## 2018-02-14 NOTE — PLAN OF CARE
CARDIOVASCULAR - ADULT     Maintains optimal cardiac output and hemodynamic stability Progressing     Absence of cardiac dysrhythmias or at baseline rhythm Progressing        DISCHARGE PLANNING - CARE MANAGEMENT     Discharge to post-acute care or home with appropriate resources Progressing        Potential for Falls     Patient will remain free of falls Progressing        Prexisting or High Potential for Compromised Skin Integrity     Skin integrity is maintained or improved Progressing        SKIN/TISSUE INTEGRITY - ADULT     Skin integrity remains intact Progressing     Incision(s), wounds(s) or drain site(s) healing without S/S of infection Progressing     Oral mucous membranes remain intact Progressing

## 2018-02-14 NOTE — PROGRESS NOTES
Patient coughing with vomiting after eating  Made NPO with speech eval  SLIM made aware  Called into room by patient's family member, dermatologist called daughter in law to tell results of swab done prior to hospital visit  Came back positive for bacterial infection, recommendation made for treatment  SLIM made aware of positive finding as well as recommendation for treatment  New orders placed, continue to monitor

## 2018-02-14 NOTE — SPEECH THERAPY NOTE
Speech-Language Pathology Bedside Swallow Evaluation        Patient Name: Rigoberto Joseph    CRZYY'L Date: 2/14/2018     Problem List  Patient Active Problem List   Diagnosis    Hypertension    Atrial fibrillation with RVR ([facility] )    Chronic systolic CHF (congestive heart failure) (HCC)    [facility]    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    Depression    Rash    Stage 3 chronic kidney disease    Hyperkalemia    Hyponatremia    Azotemia       Past Medical History  Past Medical History:   Diagnosis Date    Atrial fibrillation ([facility] )     Hypertension     Macular degeneration        Past Surgical History  History reviewed  No pertinent surgical history  Current Medical Status  Pt is a 80 y o  male who presented to [address] [place] [address] 2/11/18 dx: afib, w/ RVR  Pt  reports that overnight he we increasingly itchy overnight last night and did not sleep much  No new soaps/medications  No notable lesions or rash  He also had some chest pain that started last night and resolved  He then had a repeated episode of chest pain earlier in the day today described as a pressure sensation located in lower chest/mid epigastrum  Pain was non-radiating  No associated neck/jaw/arm pain  Had one episode of vomiting in afternoon and then sx of chest pain resolved  Past medical history:   Please see H&P for details      Special Studies:  CXR: lungs clear    Social/Education/Vocational Hx:  Pt lives at home       Swallow Information   Current Risks for Dysphagia & Aspiration: remote hx of dysphagia- reports OCCAS " choking on food" and regurgitates  Current Symptoms/Concerns: choking incident- pt reported ate some beef, eunice and rice at lunch, but it wasn't as good as it looked so he stopped eating it and switched to pudding  Pt then felt like pudding got stuck and tried to wash it down w/ cran juice and regurgitated pudding and juice      Current Diet: NPO      Baseline Diet: regular diet and thin liquids      Baseline Assessment   Behavior/Cognition: alert    Speech/Language Status: able to participate in conversation and able to follow commands- but not able to given good details about incident today or in the past      Patient Positioning: upright in chair       Swallow Mechanism Exam   Facial: symmetrical  Labial: WFL  Lingual: WFL  Mandible: adequate ROM  Dentition: edentulous and upper dentures  Vocal quality:clear/adequate   Volitional Cough: strong/productive   Respiratory: RA      Consistencies Assessed and Performance   Consistencies Administered: no po given     Oral Stage: NA    Pharyngeal Stage:  NA      Esophageal Concerns: suspect food impaction; pt w/ basin in front of him full of thin, clear and some frothy mucous  Pt noted to become wet and gurgly at times while speaking, +throat clearing w/ expectoration of mucous  Summary   Swallow Summary: No po given to assess oral and pharyngeal stages of swallowing due to suspected food impaction in esophagus  Pt cont to expectorate thin clear mucous         Recommendations: NPO & GI consult for possible EGD    Recommended Form of Meds: non-oral if possible     Aspiration precautions     Results Reviewed with: patient, RN and CRNP     Treatment Recommendations: will follow up after GI consult and piossible intervention

## 2018-02-14 NOTE — ASSESSMENT & PLAN NOTE
· Call from nursing this afternoon the patient was noted to possibly choke" on his lunch   · Review of history and physical indicate on admission patient admitted that he had chest pain and a pressure-like sensation in his lower chest mid epigastrium of which he vomited and then the symptoms of chest pain had resolved  · Speech therapy consult ordered  Prior to her evaluation patient was spitting all his saliva and mucus like secretions in a basin  He stated that occasionally has difficulty with swallowing his food    Denied any respiratory difficulty or tightness in his chest  · Maintain NPO status until seen and evaluated by GI  · Consult GI for possible consideration of EGD  · Start Protonix

## 2018-02-14 NOTE — ASSESSMENT & PLAN NOTE
· Patient with history of permanent atrial fibrillation  · Cardiology following recommendations appreciated  · Heart rate currently controlled on metoprolol 100 mg twice daily  · Eliquis dose reduced to 2 5 mg b i d  due to renal function  · Digoxin discontinued due to renal function and risk of toxicity

## 2018-02-14 NOTE — PROGRESS NOTES
NEPHROLOGY PROGRESS NOTE   Lahoma Prader 80 y o  male MRN: [id number]  Unit/Bed#: -01 Encounter: [id number]  Reason for Consult: JOHN    ASSESSMENT AND PLAN:  Patient is a 27-year-old male with history of hypertension, nonischemic cardiomyopathy with chronic CHF, EF previously 50%, AFib, comes with complaint of itching  He was found to be AFib with RVR with hypotension  We are consulted for JOHN/CKD management      Nonoliguric JOHN on likely CKD III with baseline creatinine 1 1 to 1 3  - Peak creatinine 4 5 now slowly improving to 2 2  Previous creatinine value was 1 1 about two years ago  Unable to comment whether he has progression of CKD as well  - JOHN likely secondary to hypotension, AFib with RVR causing hemodynamic insult, use of lisinopril with hypotension causing autoregulatory failure  - continue to hold lisinopril, currently remains off IV fluid and off diuretics  - BMP in a m  [employer] - renal ultrasound shows normal size kidneys, normal echogenicity, no hydronephrosis or stones  - bladder scan nonsignificant  - urinalysis shows concentrated sample, 4 to 10 RBCs, trace proteinuria  Urine eosinophils 0%, no obvious peripheral eosinophilia      Mild hyperkalemia, improved today  strict potassium restricted diet  Urine output good  Closely monitor for now  Currently remains off diuretics      Mild hyponatremia, likely hypervolemic; improving with fluid restriction  - strict fluid restriction 1 2 L per day  Patient is auto diuresing with negative balance last 24 hours without diuretics  - clinically patient has mild leg edema although chest x-ray does not show significant pulmonary congestion   - currently holding diuretics for now    - follow serum osmolality, urine osmolality 490, urine sodium 21, urine chloride <10     Azotemia, likely due to renal failure, improving today  - follow stool occult blood although no significant anemia   - BMP in AM     Cardiomyopathy, history of CHF,  Repeat echocardiogram shows EF 45%  Currently he remains on oxygen via nasal cannula although does not seem to be in acute respiratory distress  Currently holding diuretics for today  Consider restart diuretics in next 24-48 hours  If has worsening respiratory symptoms, may use p r n  Lasix  - daily weight  - he remains negative balance in last 24 hours    SUBJECTIVE:  Patient seen and examined at bedside  No chest pain, no worsening of shortness of breath, no nausea, vomiting, abdominal pain or diarrhea  No urinary complaints       OBJECTIVE:  Current Weight: Weight - Scale: 105 kg (231 lb 7 7 oz)  Vitals:    02/14/18 0642   BP: 135/98   Pulse: 82   Resp: 18   Temp: 98 5 °F (36 9 °C)   SpO2: 100%       Intake/Output Summary (Last 24 hours) at 02/14/18 5772  Last data filed at 02/14/18 7443   Gross per 24 hour   Intake              840 ml   Output             2175 ml   Net            -1335 ml       Physical Examination:  General:  Lying in bed, no acute distress    Eyes:  No conjunctival pallor present  ENT:  External examination of ears and nose unremarkable  Neck:  Supple  Respiratory:  Bilateral air entry present, no obvious wheezing or crackles appreciated  CVS:  S1, S2 present  GI:  Soft, nontender, nondistended  CNS:  Active, alert, oriented x3  Extremities:  1+ edema in lower extremities  Skin:  Chronic skin changes in both legs    Medications:    Current Facility-Administered Medications:     acetaminophen (TYLENOL) tablet 650 mg, 650 mg, Oral, Q4H PRN, Sandra Hoyt PA-C, 650 mg at 02/13/18 2120    albuterol (PROVENTIL HFA,VENTOLIN HFA) inhaler 2 puff, 2 puff, Inhalation, Q4H PRN, Sandra Hoyt PA-C    apixaban (ELIQUIS) tablet 2 5 mg, 2 5 mg, Oral, BID, Sandra Hoyt PA-C, 2 5 mg at 02/14/18 0825    aspirin (ECOTRIN LOW STRENGTH) EC tablet 81 mg, 81 mg, Oral, Daily, Sandra Hoyt PA-C, 81 mg at 02/14/18 0825    calcium carbonate (TUMS) chewable tablet 1,000 mg, 1,000 mg, Oral, Daily PRN, Cholo Hoyt PA-C    citalopram (CeleXA) tablet 10 mg, 10 mg, Oral, Daily, Sandra Hoyt PA-C, 10 mg at 02/14/18 0825    diphenhydrAMINE-zinc acetate (BENADRYL) 2-0 1 % cream, , Topical, TID PRN, Ze Edwards PA-C    loratadine (CLARITIN) tablet 10 mg, 10 mg, Oral, Daily, Sandra Hoyt PA-C, 10 mg at 02/14/18 0825    metoprolol tartrate (LOPRESSOR) tablet 100 mg, 100 mg, Oral, Q12H [facility], Cinthia Nassar MD, 100 mg at 02/14/18 0825    nystatin (MYCOSTATIN) powder, , Topical, TID, Ze Edwards PA-C    ondansetron [facility]) injection 4 mg, 4 mg, Intravenous, Q6H PRN, Sandra Hoyt PA-C, 4 mg at 02/12/18 2116    oxybutynin (DITROPAN) tablet 5 mg, 5 mg, Oral, BID, Sandra Hoyt PA-C, 5 mg at 02/14/18 0825    senna (SENOKOT) tablet 8 6 mg, 1 tablet, Oral, Daily, Sandra Hoyt PA-C, 8 6 mg at 02/14/18 1670    Laboratory Results:    Results from last 7 days  Lab Units 02/14/18  0547 02/13/18  0530 02/12/18  0524 02/11/18  1806   WBC Thousand/uL 6 24  --  8 61 10 13   HEMOGLOBIN g/dL 12 1  --  14 6 15 7   HEMATOCRIT % 36 4*  --  44 6 48 3   PLATELETS Thousands/uL 201  --  285 292   SODIUM mmol/L 136 132* 135* 136   POTASSIUM mmol/L 4 5 5 0 5 0 4 5   CHLORIDE mmol/L 100 96* 95* 94*   CO2 mmol/L 27 25 25 29   BUN mg/dL 72* 82* 51* 39*   CREATININE mg/dL 2 24* 4 06* 4 58* 3 78*   CALCIUM mg/dL 8 5 8 2* 8 4 9 3   MAGNESIUM mg/dL  --  1 9 1 9  --    PHOSPHORUS mg/dL  --  4 4*  --   --    TOTAL PROTEIN g/dL  --  6 0*  --  7 2   GLUCOSE RANDOM mg/dL 94 121 119 111       Results for orders placed during the hospital encounter of 02/11/18   XR chest 1 view portable    Narrative CHEST     INDICATION:  Chest pain    COMPARISON:  6/9/2015    VIEWS:   AP frontal    IMAGES:  1    FINDINGS:    There is mild enlargement of the cardiac silhouette  The lungs are clear  No pneumothorax or pleural effusion      Visualized osseous structures appear within normal limits for the patient's age  Impression Mild enlargement of cardiac silhouette  Clear lungs  Workstation performed: [id number]       Results for orders placed in visit on 06/09/15   XR chest pa & lateral    Narrative CHEST   INDICATION- Shortness of breath  Bilateral lower extremity swelling  COMPARISON- 5/26/2015  VIEWS-  Frontal and lateral projections    3 images   FINDINGS-   Heart shadow is enlarged but stable  There is small moderate right pleural effusion, slightly increased from   the previous examination  Mild pulmonary vascular congestive changes   are noted  No pneumothorax  The visualized osseous structures appear within normal limits for the   patient's age  IMPRESSION-   Stable cardiomegaly  Approximately stable mild pulmonary vascular   congestive change  Slight increase in small to moderate right pleural   effusion  Transcribed on- [id number]           GEETA Lucia MD        Reading Radiologist- GEETA Shultz MD        Electronically GEETA Lorenzo MD        Released Date Time- 06/09/15 1924      ------------------------------------------------------------------------------   [address] [place] [address]      No results found for this or any previous visit  No results found for this or any previous visit  No results found for this or any previous visit  No results found for this or any previous visit  Portions of the record may have been created with voice recognition software  Occasional wrong word or "sound a like" substitutions may have occurred due to the inherent limitations of voice recognition software  Read the chart carefully and recognize, using context, where substitutions have occurred

## 2018-02-14 NOTE — PROGRESS NOTES
General Cardiology   Progress Note -  General cardiology   Margaux Dumas 80 y o  male MRN: [id number]    Unit/Bed#: -01 Encounter: [id number]        Subjective:    No significant events overnight  Review of Systems   Constitution: Positive for weight loss  Negative for chills, fever, weakness and malaise/fatigue  Cardiovascular: Positive for leg swelling  Negative for chest pain, claudication, cyanosis, dyspnea on exertion, irregular heartbeat, near-syncope, orthopnea, palpitations, paroxysmal nocturnal dyspnea and syncope  Respiratory: Negative for cough and shortness of breath  Skin: Positive for itching  Gastrointestinal: Negative for heartburn, nausea and vomiting  Neurological: Negative for dizziness, focal weakness, headaches and light-headedness  Objective:   Vitals: Blood pressure 135/98, pulse 82, temperature 98 5 °F (36 9 °C), temperature source Oral, resp  rate 18, height 5' 10" (1 778 m), weight 105 kg (231 lb 7 7 oz), SpO2 100 %  ,       Body mass index is 33 21 kg/m²  ,     Systolic (54WRO), EBB:671 , Min:108 , BHC:070     Diastolic (24AJL), ZLP:45, Min:57, Max:98          Intake/Output Summary (Last 24 hours) at 02/14/18 1025  Last data filed at 02/14/18 0810   Gross per 24 hour   Intake              840 ml   Output             2175 ml   Net            -1335 ml     Weight (last 2 days)     Date/Time   Weight    02/14/18 0600  105 (231 48)    02/13/18 0542  105 (231 92)    02/12/18 0539  106 (232 81)                Telemetry Review:  A fib rate controlled        Physical Exam    LABORATORY RESULTS    Results from last 7 days  Lab Units 02/12/18  0047 02/11/18  2109 02/11/18  1806   TROPONIN I ng/mL 0 06* 0 06* 0 05*     CBC with diff:   Results from last 7 days  Lab Units 02/14/18  0547 02/12/18  0524 02/11/18  1806   WBC Thousand/uL 6 24 8 61 10 13   HEMOGLOBIN g/dL 12 1 14 6 15 7   HEMATOCRIT % 36 4* 44 6 48 3   MCV fL 95 95 95   PLATELETS Thousands/uL 201 285 292   MCH pg 31 4 31 1 30 8   MCHC g/dL 33 2 32 7 32 5   RDW % 13 3 14 0 14 0   MPV fL 9 8 10 8 10 4       CMP:  Results from last 7 days  Lab Units 18  0547 18  0530 18  0524 18  1806   SODIUM mmol/L 136 132* 135* 136   POTASSIUM mmol/L 4 5 5 0 5 0 4 5   CHLORIDE mmol/L 100 96* 95* 94*   CO2 mmol/L 27 25 25 29   ANION GAP mmol/L 9 11 15* 13   BUN mg/dL 72* 82* 51* 39*   CREATININE mg/dL 2 24* 4 06* 4 58* 3 78*   GLUCOSE RANDOM mg/dL 94 121 119 111   CALCIUM mg/dL 8 5 8 2* 8 4 9 3   AST U/L  --  88*  --  39   ALT U/L  --  42  --  34   ALK PHOS U/L  --  31*  --  46   TOTAL PROTEIN g/dL  --  6 0*  --  7 2   BILIRUBIN TOTAL mg/dL  --  1 30*  --  1 20*   EGFR ml/min/1 73sq m 26 13 11 14       BMP:  Results from last 7 days  Lab Units 18  0547 18  0530 18  0524 18  1806   SODIUM mmol/L 136 132* 135* 136   POTASSIUM mmol/L 4 5 5 0 5 0 4 5   CHLORIDE mmol/L 100 96* 95* 94*   CO2 mmol/L 27 25 25 29   BUN mg/dL 72* 82* 51* 39*   CREATININE mg/dL 2 24* 4 06* 4 58* 3 78*   GLUCOSE RANDOM mg/dL 94 121 119 111   CALCIUM mg/dL 8 5 8 2* 8 4 9 3       Lab Results   Component Value Date    NTBNP 760 (H) 2018             Results from last 7 days  Lab Units 18  0530 18  0524   MAGNESIUM mg/dL 1 9 1 9                 Results from last 7 days  Lab Units 18  0530   TSH 3RD GENERATON uIU/mL 0 651         Results from last 7 days  Lab Units 18  0524 18  1806   INR  1 48* 1 28*         Cardiac testing:   Results for orders placed during the hospital encounter of 18   Echo complete with contrast if indicated    Narrative [address], [address]  [phone]    Transthoracic Echocardiogram  2D, M-mode, Doppler, and Color Doppler    Study date:  2018    Patient: Marsha Reynolds  MR number: [id number]  Account number: [de-identified]  : 1935  Age: 80 years  Gender: Male  Status: Inpatient  Location: Bedside  Height: 70 in  Weight: 231 4 lb  BP: 111/ 51 mmHg    Indications: Afib    Diagnoses: I48 0 - Atrial fibrillation    Sonographer:  SEMAJ Donaldson  Primary Physician:  DO Semaj  Referring Physician:  Kevon Urias PA-C  Group:  [facility]  Interpreting Physician:  Phillip Villegas MD    SUMMARY    LEFT VENTRICLE:  The ventricle was dilated at 6 6 cm  Systolic function was mildly reduced  Ejection fraction was estimated to be 45 %  There was mild diffuse hypokinesis  Cannot rule out specific regional variations due to poor endocardial visualization  Wall thickness was mildly increased  AORTIC VALVE:  There was mild regurgitation  AORTA:  The root exhibited dilatation up to 4 4 cm  HISTORY: PRIOR HISTORY: Afib w/ RVR; chest pain, hypertension, heart failure, malignant neoplasm of skin    PROCEDURE: The procedure was performed at the bedside  This was a routine study  The transthoracic approach was used  The study included complete 2D imaging, M-mode, complete spectral Doppler, and color Doppler  The heart rate was 101 bpm,  at the start of the study  Images were obtained from the parasternal, apical, subcostal, and suprasternal notch acoustic windows  Image quality was adequate  LEFT VENTRICLE: The ventricle was dilated at 6 6 cm  Systolic function was mildly reduced  Ejection fraction was estimated to be 45 %  There was mild diffuse hypokinesis  Cannot rule out specific regional variations due to poor endocardial  visualization  Wall thickness was mildly increased  DOPPLER: Normal sinus rhythm was absent  RIGHT VENTRICLE: The size was normal  Systolic function was normal     LEFT ATRIUM: The atrium was dilated  RIGHT ATRIUM: The atrium was dilated  MITRAL VALVE: Valve structure was normal  There was normal leaflet separation  DOPPLER: There was no evidence for stenosis  There was trace regurgitation      AORTIC VALVE: The valve was probably trileaflet  Leaflets exhibited mildly increased thickness and normal cuspal separation  The valve was not well visualized  DOPPLER: There was no evidence for stenosis  There was mild regurgitation  TRICUSPID VALVE: The valve structure was normal  There was normal leaflet separation  DOPPLER: There was no evidence for stenosis  There was trace regurgitation  PULMONIC VALVE: Leaflets exhibited normal thickness, no calcification, and normal cuspal separation  DOPPLER: The transpulmonic velocity was within the normal range  There was trace regurgitation  PERICARDIUM: There was no pericardial effusion  A pericardial fat pad was present  The pericardium was normal in appearance  AORTA: The root exhibited dilatation up to 4 4 cm  SYSTEM MEASUREMENT TABLES    2D  %FS: 26 27 %  AV Diam: 4 46 cm  EDV(Teich): 206 85 ml  EF Biplane: 35 28 %  EF(Teich): 50 47 %  ESV(Teich): 102 45 ml  IVSd: 1 07 cm  LA Area: 20 26 cm2  LA Diam: 5 cm  LVEDV MOD A2C: 158 79 ml  LVEDV MOD A4C: 81 48 ml  LVEDV MOD BP: 117 6 ml  LVEF MOD A2C: 32 28 %  LVEF MOD A4C: 43 62 %  LVESV MOD A2C: 107 53 ml  LVESV MOD A4C: 45 94 ml  LVESV MOD BP: 76 11 ml  LVIDd: 6 38 cm  LVIDs: 4 7 cm  LVLd A2C: 9 92 cm  LVLd A4C: 9 2 cm  LVLs A2C: 9 5 cm  LVLs A4C: 8 05 cm  LVPWd: 1 03 cm  RA Area: 23 33 cm2  SV MOD A2C: 51 26 ml  SV MOD A4C: 35 54 ml  SV(Teich): 104 4 ml    MM  TAPSE: 1 82 cm    [employer] Accredited Echocardiography Laboratory    Prepared and electronically signed by    Nara Stratton MD  Signed 13-Feb-2018 14:08:06       Results for orders placed in visit on 06/09/15   NM myocardial perfusion spect (stress and/or rest)    Narrative PHARMACOLOGIC STRESS TEST, LEXISCAN          INDICATION-  Primary cardiomyopathy  History of cellulitis and lower   extremity edema  Provided ICD-9 Code- 425 4    COMPARISON- No prior studies,   TECHNIQUE-  Pharmacologic stress testing was performed utilizing   Dre Irish     SPECT myocardial perfusion images was performed  Dosages of   TC-99-mm Myoview were 10 7 mCi and 44 8 mCi IV  An initial dose of   9 4 mCi was attempted, however there was dose infiltration which   resulted in diminished activity on resting images  Therefore, an   additional 10 7 mCi was given at rest, and the stress dose increased to   44 8 mCi  Time to peak imaging for rest 45 minutes and stress 34   minutes  Both rest and stress images were performed  Images were then   reconstructed in the short, vertical and horizontal long axes   projections  Baseline heart rate 93 beats per minute  Peak heart of 100 beats per minute  Baseline blood pressure 125/71 mmHg  Peak blood pressure 128/70 mmHg  Symptoms-   None   Please see cardiology report of physiologic data  FINDINGS-   OVERALL QUALITY-   Acceptable  ARTIFACT-  See below   PERFUSION IMAGES-   Although there is apparent diminished activity in   the inferior wall, this is felt to be related to diaphragm attenuation   artifact  CT attenuation correction protocol could not be utilized  There is no definite ischemia or fixed perfusion deficit   WALL MOTION-  Severe global hypokinesis  Further supporting lack of a   focal defect of the inferior wall, although hypokinetic, there is   inferior wall contractility similar to the remainder of the myocardium   EJECTION FRACTION-  21 %   IMPRESSION-   1  There is no definite myocardial ischemia or fixed perfusion deficit   2   Left ventricular ejection fraction- 21%, with severe global   hypokinesis   Transcribed on- [id number]           GEETA Macdonald MD        Reading Radiologist- GEETA Anne MD        Electronically Signed- GEETA Anne MD        Released Date Time- 06/16/15 1211      ------------------------------------------------------------------------------   AMRIK RAMOS          Meds/Allergies   all current active meds have been reviewed and current meds: Current Facility-Administered Medications   Medication Dose Route Frequency    acetaminophen (TYLENOL) tablet 650 mg  650 mg Oral Q4H PRN    albuterol (PROVENTIL HFA,VENTOLIN HFA) inhaler 2 puff  2 puff Inhalation Q4H PRN    apixaban (ELIQUIS) tablet 2 5 mg  2 5 mg Oral BID    aspirin (ECOTRIN LOW STRENGTH) EC tablet 81 mg  81 mg Oral Daily    calcium carbonate (TUMS) chewable tablet 1,000 mg  1,000 mg Oral Daily PRN    citalopram (CeleXA) tablet 10 mg  10 mg Oral Daily    diphenhydrAMINE-zinc acetate (BENADRYL) 2-0 1 % cream   Topical TID PRN    loratadine (CLARITIN) tablet 10 mg  10 mg Oral Daily    metoprolol tartrate (LOPRESSOR) tablet 100 mg  100 mg Oral Q12H LAKESHIA    nystatin (MYCOSTATIN) powder   Topical TID    ondansetron (ZOFRAN) injection 4 mg  4 mg Intravenous Q6H PRN    oxybutynin (DITROPAN) tablet 5 mg  5 mg Oral BID    senna (SENOKOT) tablet 8 6 mg  1 tablet Oral Daily     Prescriptions Prior to Admission   Medication    albuterol (PROAIR HFA) 90 mcg/act inhaler    apixaban (ELIQUIS) 5 mg    aspirin (ECOTRIN LOW STRENGTH) 81 mg EC tablet    citalopram (CeleXA) 10 mg tablet    digoxin (LANOXIN) 0 125 mg tablet    furosemide (LASIX) 40 mg tablet    lisinopril (ZESTRIL) 2 5 mg tablet    loratadine (CLARITIN) 10 mg tablet    metoprolol tartrate (LOPRESSOR) 50 mg tablet    NON FORMULARY    oxybutynin (DITROPAN) 5 mg tablet            Assessment:  Principal Problem:    Atrial fibrillation with RVR ([facility])  Active Problems:    Hypertension    Chronic systolic CHF (congestive heart failure) ([facility])    Acute kidney injury ([facility])    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    Depression    Rash    Stage 3 chronic kidney disease    Hyperkalemia    Hyponatremia    Azotemia      Assessment/ Plan  Chronic systolic HF/NICM  Repeat echo does not show significant change in LV function, EF previously 50%, now currently estimated at 45%  Continue beta blocker as tolerated   Not on Lisinopril currently due to renal dysfunction  Diuretic also held  Will order fld restriction, per nephr notes       Permanent afib  Rates controlled on metoprolol tartrate 100 mg q12h  Eliquis reduced to 2 5 bid  Digoxin stopped due to renal dysfunction and concern for risk of digoxin toxicity  HTN /79  BP controlled to low on Metoprolol       JOHN/CKD III  Unclear if acute worsening or has been chronically progressing  Renal following  Cr improved at 2 2,BUN 72        Thank you for allowing us to participate in the care of your pt  He will follow up with Dr Williams Castaneda once discharged  Counseling / Coordination of Care  Total floor / unit time spent today 20 minutes  Greater than 50% of total time was spent with the patient and / or family counseling and / or coordination of care  ** Please Note: Dragon 360 Dictation voice to text software may have been used in the creation of this document   **

## 2018-02-14 NOTE — ASSESSMENT & PLAN NOTE
· Present on admission, suspect secondary to hypotension, AFib with RVR, lisinopril with hypotension causing on a regulatory failure per Nephrology  · serum creatinine improved to 2 24, unclear of exact baseline but suspect 1 1-1 3  · continue to hold Zestril/Lasix   · Renal ultrasound negative for obstructive etiology/hydronephrosis   · Avoid nephrotoxins

## 2018-02-14 NOTE — PLAN OF CARE
Problem: SLP ADULT - SWALLOWING, IMPAIRED  Goal: Initial SLP swallow eval performed  Outcome: Progressing  NPO pending GI consult

## 2018-02-14 NOTE — PROGRESS NOTES
Progress Note - Genny Yee 1935, 80 y o  male MRN: [id number]    Unit/Bed#: -[place] Encounter: [id number]    Primary Care Provider: Pancho Saeed DO   Date and time admitted to hospital: 2/11/2018  5:21 PM        Dysphagia   Assessment & Plan    · Call from nursing this afternoon the patient was noted to possibly choke" on his lunch   · Review of history and physical indicate on admission patient admitted that he had chest pain and a pressure-like sensation in his lower chest mid epigastrium of which he vomited and then the symptoms of chest pain had resolved  · Speech therapy consult ordered  Prior to her evaluation patient was spitting all his saliva and mucus like secretions in a basin  He stated that occasionally has difficulty with swallowing his food    Denied any respiratory difficulty or tightness in his chest  · Maintain NPO status until seen and evaluated by GI  · Consult GI for possible consideration of EGD  · Start Protonix        * Atrial [facility] with [facility] ([facility] )   Assessment & Plan    · Patient with history of permanent atrial fibrillation  · Cardiology following recommendations appreciated  · Heart rate currently controlled on metoprolol 100 mg twice daily  · Eliquis dose reduced to 2 5 mg b i d  due to renal function  · Digoxin discontinued due to renal function and risk of toxicity         Acute kidney injury ([facility] )   Assessment & Plan    · Present on admission, suspect secondary to hypotension, AFib with RVR, lisinopril with hypotension causing on a regulatory failure per Nephrology  · serum creatinine improved to 2 24, unclear of exact baseline but suspect 1 1-1 3  · continue to hold Zestril/Lasix   · Renal ultrasound negative for obstructive etiology/hydronephrosis   · Avoid nephrotoxins        Chronic systolic CHF (congestive heart failure) ([facility] )   Assessment & Plan    · Without acute exacerbation  · Appreciate Cardiology consultation and recommendations   · CXR negative for pulmonary edema/vascular congestion - echocardiogram EF 45% (prior 50%)   · Zestril/Lasix/Digoxin held due to acute kidney injury - continue metoprolol 100 mg b i d   · Monitor I/Os and daily weights, weights stable  · Discussed with nursing to titrate off oxygen to maintain saturations greater than 92%          Hypertension   Assessment & Plan    · Blood pressure acceptable  · Continue Lopressor  · Zestril on hold due to JOHN         Rash   Assessment & Plan    · Complains of pruritic and mildly erythematic rash over hands, abdomen/trunk and back follow-up with Dermatology  · Patient's daughter stated that the dermatologist called her today to state that he has a bacterial infection from a swab that they took during his last visit  · Patient started on clindamycin 300 mg every 12 hours and santyl ointment to bilateral lower extremities  · P r n  diphenhydramine cream for itch  · Continue daily Claritin        Depression   Assessment & Plan    · Mood appears stable  · Continue Celexa          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   Assessment & Plan    · Recent excision of cancerous skin lesion on L knee  · Daily wound management with hydrogen peroxide and vaseline per patient's dermatologist        Hyponatremia   Assessment & Plan    · Nephrology following  · Suspect hypervolemic hyponatremia  · Continue fluid restriction 1 2 L per day  · Diuretics placed on hold  · Monitor BMP          VTE Pharmacologic Prophylaxis:   Pharmacologic: Apixaban (Eliquis)  Mechanical VTE Prophylaxis in Place: No    Patient Centered Rounds: I have performed bedside rounds with nursing staff today  Discussions with Specialists or Other Care Team Provider:     Education and Discussions with Family / Patient: patient, left voicemail for daughter in-law    Time Spent for Care: 30 minutes  More than 50% of total time spent on counseling and coordination of care as described above      Current Length of Stay: 3 day(s)    Current Patient Status: Inpatient   Certification Statement: The patient will continue to require additional inpatient hospital stay due to need for GI consult regarding dysphagia, monitoring creatinine    Discharge Plan:  Home when medically stable and cleared from a nephrology standpoint and Cardiology    Code Status: Level 1 - Full Code      Subjective:   Patient denies chest pain  Shortness of breath with exertion only none at rest no N/V  Per nsg this afternoon he appeared to "choke" on his food, and speech therapy reported he is spitting all his secretion in a basin  He said he occasionally has trouble swallowing  No pain  No HA or dizziness  Requested roller walker for home  Objective:     Vitals:   Temp (24hrs), Av 8 °F (37 1 °C), Min:98 5 °F (36 9 °C), Max:99 3 °F (37 4 °C)    HR:  [71-97] 71  Resp:  [18] 18  BP: (110-144)/(57-98) 144/71  SpO2:  [94 %-100 %] 94 %  Body mass index is 33 21 kg/m²  Input and Output Summary (last 24 hours): Intake/Output Summary (Last 24 hours) at 18 1654  Last data filed at 18 1632   Gross per 24 hour   Intake              170 ml   Output             2350 ml   Net            -2180 ml       Physical Exam:     Physical Exam   Constitutional: He is oriented to person, place, and time  He appears well-developed and well-nourished  No distress  HENT:   Head: Normocephalic  Neck: Neck supple  Cardiovascular: Normal rate and regular rhythm  Pulmonary/Chest: Effort normal and breath sounds normal  No stridor  No respiratory distress  Abdominal: Soft  Bowel sounds are normal  He exhibits distension  There is no tenderness  Obese distended round abdomen   Musculoskeletal: Normal range of motion  He exhibits edema  1+ edema bilateral lower extremities, mild PVD discoloration with dry scaly skin   Neurological: He is alert and oriented to person, place, and time  No cranial nerve deficit  Skin: Skin is warm and dry     Psychiatric: He has a normal mood and affect  His behavior is normal    Vitals reviewed  Additional Data:     Labs:      Results from last 7 days  Lab Units 02/14/18  0547   WBC Thousand/uL 6 24   HEMOGLOBIN g/dL 12 1   HEMATOCRIT % 36 4*   PLATELETS Thousands/uL 201   NEUTROS PCT % 73   LYMPHS PCT % 14   MONOS PCT % 5   EOS PCT % 8*       Results from last 7 days  Lab Units 02/14/18  0547 02/13/18  0530   SODIUM mmol/L 136 132*   POTASSIUM mmol/L 4 5 5 0   CHLORIDE mmol/L 100 96*   CO2 mmol/L 27 25   BUN mg/dL 72* 82*   CREATININE mg/dL 2 24* 4 06*   CALCIUM mg/dL 8 5 8 2*   TOTAL PROTEIN g/dL  --  6 0*   BILIRUBIN TOTAL mg/dL  --  1 30*   ALK PHOS U/L  --  31*   ALT U/L  --  42   AST U/L  --  88*   GLUCOSE RANDOM mg/dL 94 121       Results from last 7 days  Lab Units 02/12/18  0524   INR  1 48*       * I Have Reviewed All Lab Data Listed Above  * Additional Pertinent Lab Tests Reviewed:  All Labs Within Last 24 Hours Reviewed    Imaging:    Imaging Reports Reviewed Today Include: none  Imaging Personally Reviewed by Myself Includes:  none    Recent Cultures (last 7 days):           Last 24 Hours Medication List:     Current Facility-Administered Medications:  acetaminophen 650 mg Oral Q4H PRN Sandra Hoyt PA-C   albuterol 2 puff Inhalation Q4H PRN Sandra Hoty PA-C   apixaban 2 5 mg Oral BID Sandra Hoyt PA-C   aspirin 81 mg Oral Daily Sandra Hoyt PA-C   calcium carbonate 1,000 mg Oral Daily PRN Sandra Hoyt PA-C   citalopram 10 mg Oral Daily Sandra Hoyt PA-C   clindamycin 300 mg Oral Q12H [facility] Nathan Odom PA-C   collagenase  Topical Daily Nathan Rapp PA-C   diphenhydrAMINE-zinc acetate  Topical TID PRN George Herrera PA-C   loratadine 10 mg Oral Daily Sandra Hoyt PA-C   metoprolol tartrate 100 mg Oral Q12H Arie Hi MD   nystatin  Topical TID George Herrera PA-C   ondansetron 4 mg Intravenous Q6H PRN Sandra Hoyt PA-C   oxybutynin 5 mg Oral BID Sandra Hoyt PA-C   senna 1 tablet Oral Daily Sandra Hoyt PA-C        Today, Patient Was Seen By: TAQUERIA Davis    ** Please Note: Dictation voice to text software may have been used in the creation of this document   **

## 2018-02-14 NOTE — PLAN OF CARE
Problem: OCCUPATIONAL THERAPY ADULT  Goal: Performs self-care activities at highest level of function for planned discharge setting  See evaluation for individualized goals  Treatment Interventions: ADL retraining, Functional transfer training, Endurance training, Patient/family training, Equipment evaluation/education, Compensatory technique education, Activityengagement          See flowsheet documentation for full assessment, interventions and recommendations  Limitation: Decreased ADL status, Decreased endurance, Decreased self-care trans, Decreased high-level ADLs     Assessment: Pt is an 81 yo male [facility] on 2/11/2018  Pt presents w/ atrial fibrillation w/ RVR and signficant PMH impacting his occupational performance including CHF, HTN, a-fib, macular degeneration  Pt reports living w/ son and daughter in law in in law suite at their house PTA  Pt reports mod I w/ ADL performance and having HHA 5 hours a week and family to assist   Pt completed grooming standing at sink w/ S after set- up  Pt completed toileting w/ S standing at toilet w/ urinalt after set- up, and min A to manage clothing up / down over hips  Pt completed functional mobility w/ min A using RW to /from bathroomo twice and benefits from cues for environmental set- up  Pt completed sit to stand from bedside reclinder chair twice during eval; once w/ mod A, once w/ min A and increased time  Pt presents w/ decreased activity tolerance, decreased standing tolerance, decreased standing balance, decreasd dynamic sitting balance impacting his I w/dressing, bathing, oral hygiene, functional mobility, functional transfers, and clothing mgmt  Pt would benefit from OT while in acute care to address deficits  From an OT perspective, pt would benfit from home OT when medically stable for discharge from acute care   Will continue to follow     OT Discharge Recommendation: Home OT  OT - OK to Discharge:  (when medically stable w/ home OT to PLOF)

## 2018-02-14 NOTE — ASSESSMENT & PLAN NOTE
· Without acute exacerbation  · Appreciate Cardiology consultation and recommendations   · CXR negative for pulmonary edema/vascular congestion - echocardiogram EF 45% (prior 50%)   · Zestril/Lasix/Digoxin held due to acute kidney injury - continue metoprolol 100 mg b i d   · Monitor I/Os and daily weights, weights stable  · Discussed with nursing to titrate off oxygen to maintain saturations greater than 92%

## 2018-02-14 NOTE — PLAN OF CARE
Problem: PHYSICAL THERAPY ADULT  Goal: Performs mobility at highest level of function for planned discharge setting  See evaluation for individualized goals  Treatment/Interventions: Functional transfer training, LE strengthening/ROM, Therapeutic exercise, Endurance training, Patient/family training, Equipment eval/education, Gait training, OT          See flowsheet documentation for full assessment, interventions and recommendations  Prognosis: Good  Problem List: Decreased endurance, Impaired balance, Decreased mobility, Decreased safety awareness, Impaired vision, Impaired sensation  Assessment: pt admitted with  a fib and rvr  pt refered to PT  pt was indep with basic adl's but needing assist for household chores  pt uses rw  pt was able to amb with rw for 180' with min asist due to imbalance and vision deficit  pt has mild to moderate functional limitations due to vision deficit, and deficits in balance, self care, cardiac and gait balance   pt will need skilled PT but will be able to return home, family and caregiver support and home PT  Barriers to Discharge: None     Recommendation: Home with family support, Home PT     PT - OK to Discharge: Yes    See flowsheet documentation for full assessment

## 2018-02-14 NOTE — PHYSICAL THERAPY NOTE
Physical Therapy Evaluation      Patient Active Problem List   Diagnosis    Hypertension    Atrial fibrillation with RVR (HCC)    [facility] failure[facility])    [facility] [address] [facility]    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    Depression    Rash    Stage 3 chronic kidney disease    Hyperkalemia    Hyponatremia    Azotemia       Past Medical History:   Diagnosis Date    At[facility] fibrillation [facility] [address] [facility]     Hypertension     Macular degeneration        History reviewed  No pertinent surgical history  02/14/18 1145   Note Type   Note type Eval only   Pain Assessment   Pain Assessment No/denies pain   Home Living   Type of Home Apartment  (in law suite)   Home Layout One level   Bathroom Equipment Grab bars in shower;Commode   Home Equipment Cane;Walker   Prior Function   Level of [place] Independent with ADLs and functional mobility; Needs assistance with IADLs   Lives With Alone   Receives Help From Family;Personal care attendant   ADL Assistance Independent   IADLs Needs assistance   Falls in the last 6 months 0   Comments pt is legally blind, uses rw for amb  pt has cane as well  pt  has assist for cleaning and laundry from [facility] once per week  last fall 8 months ago  reports he is having some difficulty with self care tasks   Restrictions/Precautions   Other Precautions Fall Risk; Chair Alarm;Visual impairment   General   Family/Caregiver Present No   Cognition   Overall Cognitive Status WFL   Orientation Level Oriented X4   RUE Assessment   RUE Assessment WFL   LUE Assessment   LUE Assessment WFL   RLE Assessment   RLE Assessment WFL   LLE Assessment   LLE Assessment WFL   Coordination   Movements are Fluid and Coordinated 1   Sensation X   Light Touch   RLE Light Touch Impaired   RLE Light Touch Comments foot   LLE Light Touch Impaired   LLE Light Touch Comments foot   Transfers   Sit to Stand 4  Minimal assistance   Additional items Assist x 1; Increased time required;Verbal cues   Stand to Sit 5  Supervision   Additional items Verbal cues; Increased time required   Ambulation/Elevation   Gait pattern Decreased foot clearance; Excessively slow  (1 provoked loss of balance)   Gait Assistance 4  Minimal assist   Assistive Device Rolling walker   Distance 180'   Balance   Static Sitting Good   Dynamic Sitting Fair +   Static Standing Fair   Dynamic Standing Fair -   Ambulatory Fair -   Endurance Deficit   Endurance Deficit No   Activity Tolerance   Activity Tolerance Patient tolerated treatment well   Nurse Made Aware yes   Assessment   Prognosis Good   Problem List Decreased endurance; Impaired balance;Decreased mobility; Decreased safety awareness; Impaired vision; Impaired sensation   Assessment pt admitted with  a fib and rvr  pt refered to PT  pt was indep with basic adl's but needing assist for household chores  pt uses rw  pt was able to amb with rw for 180' with min asist due to imbalance and vision deficit  pt has mild to moderate functional limitations due to vision deficit, and deficits in balance, self care, cardiac and gait balance   pt will need skilled PT but will be able to return home, family and caregiver support and home PT  Barriers to Discharge None   Goals   Patient Goals go home   STG Expiration Date 02/21/18   Short Term Goal #1 supervision for bed mobility, transfers, amb with rw for > 200'  demonstrate good safety practices  improve balance by 1/2 grade   Plan   Treatment/Interventions Functional transfer training;LE strengthening/ROM; Therapeutic exercise; Endurance training;Patient/family training;Equipment eval/education;Gait training;OT   PT Frequency 2-3x/wk   Recommendation   Recommendation Home with family support;Home PT   PT - OK to Discharge Yes   Modified Eliud Scale   Modified Eliud Scale 4   Barthel Index   Feeding 10   Bathing 0   Grooming Score 5   Dressing Score 5   Bladder Score 10   Bowels Score 10   Toilet Use Score 5   Transfers (Bed/Chair) Score 10   Mobility (Level Surface) Score 10   Stairs Score 0   Barthel Index Score 65   History: co - morbidities, fall risk, use of assistive device, assist for adl's,   Exam: impairments in locomotion, musculoskeletal, balance,, cardiac, vision  Clinical: unstable/unpredictable  Complexity:high    Donnita Climes, PT

## 2018-02-14 NOTE — OCCUPATIONAL THERAPY NOTE
[address] Evaluation     Patient Name: Juan James  JKSGW'M Date: 2/14/2018  Problem List  Patient Active Problem List   Diagnosis    Hypertension    Atrial fibrillation with RVR (HCC)    Chronic systolic CHF (congestive heart failure) (HCC)    [facility] [address] [facility]    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    Depression    Rash    Stage 3 chronic kidney disease    Hyperkalemia    Hyponatremia    Azotemia     Past Medical History  Past Medical History:   Diagnosis Date    Atrial fibrillation ([facility] [address] [facility]     Hypertension     Macular degeneration      Past Surgical History  History reviewed  No pertinent surgical history  02/14/18 1305   Note Type   Note type Eval/Treat   Restrictions/Precautions   Other Precautions Visual impairment; Chair Alarm; Bed Alarm; Fall Risk;Telemetry   Pain Assessment   Pain Assessment No/denies pain   Pain Score No Pain   Home Living   Type of Home Apartment; Other (Comment)  (in law suite at son and daughter in [address] [place] [address])   Home Layout One level   Bathroom Shower/Tub Walk-in shower   [address] bars in shower;Hand-held shower; Toilet raiser   Bathroom Accessibility Accessible via walker   Home Equipment Walker;Cane;Grab bars   Additional Comments Pt reports living in apt / in law suite w/ walk in shower   Prior Function   Level of [place] Independent with ADLs and functional mobility   Lives With Alone; Family; Other (Comment)  (in law suite; 2 rooms)   Receives Help From Home health; Family   ADL Assistance Independent   IADLs Needs assistance   Falls in the last 6 months 0   Vocational Retired   Comments Pt reports mod I w/ ADL and functional mobility w/ his apt using cane PTA  Pt reports having [employer] 5 hours a week to assist w/ taking him to the store and as needed ADL/ IADL   Pt reports that his family does his laundry, cleaning, meal prep   Lifestyle   Autonomy Pt reports mod I w/ ADL PTA, and needs assistance from family/ HHA w/ IADL  Pt reports legally blind for 8 years   Reciprocal Relationships Pt reports living in in law suite at family's home  Supportive and involved family arrived to visit at end of eval   Service to Others 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 [address] reports limited leisure interests, and added that he keesp to himself, and is Y[employer]  ADL   Eating Assistance 6  Modified independent   Eating Deficit Setup; Increased time to complete   Grooming Assistance 5  Supervision/Setup   Grooming Deficit Standing with assistive device; Increased time to complete;Verbal cueing;Setup   UB Bathing Assistance Unable to assess   LB Bathing Assistance Unable to assess   UB Dressing Assistance 4  Minimal Assistance   UB Dressing Deficit Setup;Verbal cueing; Increased time to complete; Fasteners   LB Dressing Assistance 5  Supervision/Setup   LB Dressing Deficit Setup; Requires assistive device for steadying;Verbal cueing; Increased time to complete;Pull up over hips   Toileting Assistance  5  Supervision/Setup   Toileting Deficit Setup; Increased time to complete;Use of bedpan/urinal setup; Other (Comment)  (standing w/ RW to use urinal in bathroom at toilet)   Bed Mobility   Supine to Sit Unable to assess   Sit to Supine Unable to assess   Additional Comments Pt seated OOB in chair upon therapist arrival and post eval w/ call bell in reach, chair alarm activated, and lunch tray   Transfers   Sit to Stand 3  Moderate assistance   Additional items Assist x 1; Armrests; Increased time required;Verbal cues  (min A 1st trial, mod A 2nd trial)   Stand to Sit 4  Minimal assistance   Additional items Assist x 1; Armrests; Increased time required;Verbal cues   Functional Mobility   Functional Mobility 4  Minimal assistance   Additional Comments to / from bathroom twice using RW; cues for env't due to decreased vision   Additional items Rolling walker   Balance   Static Sitting Good Dynamic Sitting Fair +   Static Standing Fair   Ambulatory Fair -   Activity Tolerance   Activity Tolerance Patient tolerated treatment well;Patient limited by fatigue   Medical Staff Made Aware spoke to PT, Justin Albarado spoke to RN, Chantell DIMAS Assessment   RUE Assessment [facility]   RUE Strength   RUE Overall Strength Within Functional Limits - able to perform ADL tasks with strength   LUE Assessment   LUE Assessment WFL   LUE Strength   LUE Overall Strength Within Functional Limits - able to perform ADL tasks with strength   Hand Function   Fine Motor Coordination Functional   Vision-Basic Assessment   Current Vision Other (Comment)  (legally blind for 8 years)   Visual History Macular degeneration   Patient Visual Report (pt reports seeing shadows, )   Cognition   Overall Cognitive Status [facility]   Arousal/Participation Alert; Cooperative   Attention Within functional limits   Orientation Level Oriented X4   Memory Within functional limits   Following Commands Follows one step commands with increased time or repetition   Comments Identified pt by full name, birthdate, and wrist band  Pt able to provide social history and participate in conversation appropriately this afternoon  Pt benefits from cues for environmental set- up due to decreased vision   Assessment   Limitation Decreased ADL status; Decreased endurance;Decreased self-care trans;Decreased high-level ADLs   Assessment Pt is an 81 yo male [facility]  Pt presents w/ atrial fibrillation w/ RVR and signficant PMH impacting his occupational performance including CHF, HTN, a-fib, macular degeneration  Pt reports living w/ son and daughter in law in in law suite at their house PTA  Pt reports mod I w/ ADL performance and having HHA 5 hours a week and family to assist   Pt completed grooming standing at sink w/ S after set- up   Pt completed toileting w/ S standing at toilet w/ urinalt after set- up, and min A to manage clothing up / down over hips  Pt completed functional mobility w/ min A using RW to /from bathroomo twice and benefits from cues for environmental set- up  Pt completed sit to stand from bedside reclinder chair twice during eval; once w/ mod A, once w/ min A and increased time  Pt presents w/ decreased activity tolerance, decreased standing tolerance, decreased standing balance, decreasd dynamic sitting balance impacting his I w/dressing, bathing, oral hygiene, functional mobility, functional transfers, and clothing mgmt  Pt would benefit from OT while in acute care to address deficits  From an OT perspective, pt would benfit from home OT when medically stable for discharge from acute care  Will continue to follow   Goals   Patient Goals to go home   Plan   Treatment Interventions ADL retraining;Functional transfer training; Endurance training;Patient/family training;Equipment evaluation/education; Compensatory technique education; Activityengagement   Goal Expiration Date 02/21/18   OT Frequency 3-5x/wk   Recommendation   OT Discharge Recommendation Home OT   OT - OK to Discharge (when medically stable w/ home OT to PLOF)   Barthel Index   Feeding 5   Bathing 0   Grooming Score 5   Dressing Score 5   Bladder Score 10   Bowels Score 10   Toilet Use Score 5   Transfers (Bed/Chair) Score 10   Mobility (Level Surface) Score 10   Stairs Score 0   Barthel Index Score 60   Modified Eliud Scale   Modified [place] Scale 4   Pt goals to be met in 7 days:  1  Pt will complete oral hygiene standing at sink using AD w/ mod I after set- up  2  Pt will complete functional transfers to bed, chair, and toilet w/ mod I using AD  3  Pt will complete functional shower transfer to tub seat using AD  4  Pt will complete UBD w/ mod I after set- up  5  Pt will complete LBD w/ mod I after set- up  6  Pt will complete bed mobility w/ mod I supine <> sit  7   Pt will demonstrate good attention and verbalize understanding of safety precautions during ADL performance  8  Pt will demonstrate understanding of safety precautions during ADL performance  9   Pt will demonstrate increased activity tolerance to complete grooming standing at sink using AD for at least 10 minutes w/ out sign / symptoms of fatigue  Bernarda Mcmahan, OT

## 2018-02-15 ENCOUNTER — APPOINTMENT (INPATIENT)
Dept: RADIOLOGY | Facility: HOSPITAL | Age: 83
DRG: 682 | End: 2018-02-15
Payer: MEDICARE

## 2018-02-15 ENCOUNTER — ANESTHESIA (INPATIENT)
Dept: PERIOP | Facility: HOSPITAL | Age: 83
DRG: 682 | End: 2018-02-15
Payer: MEDICARE

## 2018-02-15 ENCOUNTER — ANESTHESIA EVENT (INPATIENT)
Dept: PERIOP | Facility: HOSPITAL | Age: 83
DRG: 682 | End: 2018-02-15
Payer: MEDICARE

## 2018-02-15 PROBLEM — T18.128A FOOD IMPACTION OF ESOPHAGUS: Status: ACTIVE | Noted: 2018-02-11

## 2018-02-15 PROBLEM — E87.1 HYPONATREMIA: Status: RESOLVED | Noted: 2018-02-13 | Resolved: 2018-02-15
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
## 2018-02-15 LAB
ANION GAP SERPL CALCULATED.3IONS-SCNC: 7 MMOL/L (ref 4–13)
BUN SERPL-MCNC: 60 MG/DL (ref 5–25)
CALCIUM SERPL-MCNC: 8.8 MG/DL (ref 8.3–10.1)
CHLORIDE SERPL-SCNC: 105 MMOL/L (ref 100–108)
CO2 SERPL-SCNC: 27 MMOL/L (ref 21–32)
CREAT SERPL-MCNC: 1.5 MG/DL (ref 0.6–1.3)
GFR SERPL CREATININE-BSD FRML MDRD: 43 ML/MIN/1.73SQ M
GLUCOSE SERPL-MCNC: 97 MG/DL (ref 65–140)
POTASSIUM SERPL-SCNC: 4.1 MMOL/L (ref 3.5–5.3)
SODIUM SERPL-SCNC: 139 MMOL/L (ref 136–145)

## 2018-02-15 PROCEDURE — 99232 SBSQ HOSP IP/OBS MODERATE 35: CPT | Performed by: NURSE PRACTITIONER

## 2018-02-15 PROCEDURE — C9113 INJ PANTOPRAZOLE SODIUM, VIA: HCPCS | Performed by: PHYSICIAN ASSISTANT

## 2018-02-15 PROCEDURE — 0W3P8ZZ CONTROL BLEEDING IN GASTROINTESTINAL TRACT, VIA NATURAL OR ARTIFICIAL OPENING ENDOSCOPIC: ICD-10-PCS | Performed by: INTERNAL MEDICINE

## 2018-02-15 PROCEDURE — 99232 SBSQ HOSP IP/OBS MODERATE 35: CPT | Performed by: INTERNAL MEDICINE

## 2018-02-15 PROCEDURE — 43255 EGD CONTROL BLEEDING ANY: CPT | Performed by: INTERNAL MEDICINE

## 2018-02-15 PROCEDURE — 0DC48ZZ EXTIRPATION OF MATTER FROM ESOPHAGOGASTRIC JUNCTION, VIA NATURAL OR ARTIFICIAL OPENING ENDOSCOPIC: ICD-10-PCS | Performed by: INTERNAL MEDICINE

## 2018-02-15 PROCEDURE — 80048 BASIC METABOLIC PNL TOTAL CA: CPT | Performed by: INTERNAL MEDICINE

## 2018-02-15 RX ORDER — LIDOCAINE HYDROCHLORIDE 10 MG/ML
INJECTION, SOLUTION INFILTRATION; PERINEURAL AS NEEDED
Status: DISCONTINUED | OUTPATIENT
Start: 2018-02-15 | End: 2018-02-15 | Stop reason: SURG

## 2018-02-15 RX ORDER — SODIUM CHLORIDE 9 MG/ML
INJECTION, SOLUTION INTRAVENOUS CONTINUOUS PRN
Status: DISCONTINUED | OUTPATIENT
Start: 2018-02-15 | End: 2018-02-15 | Stop reason: SURG

## 2018-02-15 RX ORDER — ONDANSETRON 2 MG/ML
4 INJECTION INTRAMUSCULAR; INTRAVENOUS ONCE AS NEEDED
Status: DISCONTINUED | OUTPATIENT
Start: 2018-02-15 | End: 2018-02-15 | Stop reason: HOSPADM

## 2018-02-15 RX ORDER — METOPROLOL TARTRATE 5 MG/5ML
5 INJECTION INTRAVENOUS EVERY 6 HOURS PRN
Status: DISCONTINUED | OUTPATIENT
Start: 2018-02-15 | End: 2018-02-18 | Stop reason: HOSPADM

## 2018-02-15 RX ORDER — SODIUM CHLORIDE, SODIUM LACTATE, POTASSIUM CHLORIDE, CALCIUM CHLORIDE 600; 310; 30; 20 MG/100ML; MG/100ML; MG/100ML; MG/100ML
20 INJECTION, SOLUTION INTRAVENOUS CONTINUOUS
Status: DISCONTINUED | OUTPATIENT
Start: 2018-02-15 | End: 2018-02-16

## 2018-02-15 RX ORDER — TORSEMIDE 10 MG/1
10 TABLET ORAL DAILY
Status: DISCONTINUED | OUTPATIENT
Start: 2018-02-16 | End: 2018-02-18 | Stop reason: HOSPADM

## 2018-02-15 RX ORDER — METOPROLOL TARTRATE 5 MG/5ML
INJECTION INTRAVENOUS AS NEEDED
Status: DISCONTINUED | OUTPATIENT
Start: 2018-02-15 | End: 2018-02-15 | Stop reason: SURG

## 2018-02-15 RX ORDER — SODIUM CHLORIDE, SODIUM LACTATE, POTASSIUM CHLORIDE, CALCIUM CHLORIDE 600; 310; 30; 20 MG/100ML; MG/100ML; MG/100ML; MG/100ML
20 INJECTION, SOLUTION INTRAVENOUS CONTINUOUS
Status: DISCONTINUED | OUTPATIENT
Start: 2018-02-15 | End: 2018-02-16 | Stop reason: SDUPTHER

## 2018-02-15 RX ORDER — ONDANSETRON 2 MG/ML
INJECTION INTRAMUSCULAR; INTRAVENOUS AS NEEDED
Status: DISCONTINUED | OUTPATIENT
Start: 2018-02-15 | End: 2018-02-15 | Stop reason: SURG

## 2018-02-15 RX ORDER — SUCCINYLCHOLINE CHLORIDE 20 MG/ML
INJECTION INTRAMUSCULAR; INTRAVENOUS AS NEEDED
Status: DISCONTINUED | OUTPATIENT
Start: 2018-02-15 | End: 2018-02-15 | Stop reason: SURG

## 2018-02-15 RX ORDER — FENTANYL CITRATE/PF 50 MCG/ML
25 SYRINGE (ML) INJECTION
Status: DISCONTINUED | OUTPATIENT
Start: 2018-02-15 | End: 2018-02-15 | Stop reason: HOSPADM

## 2018-02-15 RX ORDER — EPHEDRINE SULFATE 50 MG/ML
INJECTION, SOLUTION INTRAVENOUS AS NEEDED
Status: DISCONTINUED | OUTPATIENT
Start: 2018-02-15 | End: 2018-02-15 | Stop reason: SURG

## 2018-02-15 RX ORDER — SUCRALFATE ORAL 1 G/10ML
1000 SUSPENSION ORAL EVERY 6 HOURS SCHEDULED
Status: DISCONTINUED | OUTPATIENT
Start: 2018-02-15 | End: 2018-02-18 | Stop reason: HOSPADM

## 2018-02-15 RX ORDER — PROPOFOL 10 MG/ML
INJECTION, EMULSION INTRAVENOUS AS NEEDED
Status: DISCONTINUED | OUTPATIENT
Start: 2018-02-15 | End: 2018-02-15 | Stop reason: SURG

## 2018-02-15 RX ADMIN — OXYBUTYNIN CHLORIDE 5 MG: 5 TABLET ORAL at 17:41

## 2018-02-15 RX ADMIN — COLLAGENASE SANTYL: 250 OINTMENT TOPICAL at 09:20

## 2018-02-15 RX ADMIN — SUCRALFATE 1000 MG: 1 SUSPENSION ORAL at 23:57

## 2018-02-15 RX ADMIN — PROPOFOL 70 MG: 10 INJECTION, EMULSION INTRAVENOUS at 14:02

## 2018-02-15 RX ADMIN — PROPOFOL 70 MG: 10 INJECTION, EMULSION INTRAVENOUS at 14:01

## 2018-02-15 RX ADMIN — METOPROLOL TARTRATE 5 MG: 1 INJECTION, SOLUTION INTRAVENOUS at 12:29

## 2018-02-15 RX ADMIN — NYSTATIN: 100000 POWDER TOPICAL at 09:20

## 2018-02-15 RX ADMIN — SODIUM CHLORIDE 80 MG: 9 INJECTION, SOLUTION INTRAVENOUS at 16:08

## 2018-02-15 RX ADMIN — SUCRALFATE 1000 MG: 1 SUSPENSION ORAL at 16:15

## 2018-02-15 RX ADMIN — METOPROLOL TARTRATE 2.5 MG: 1 INJECTION, SOLUTION INTRAVENOUS at 14:14

## 2018-02-15 RX ADMIN — SODIUM CHLORIDE, SODIUM LACTATE, POTASSIUM CHLORIDE, AND CALCIUM CHLORIDE 20 ML/HR: .6; .31; .03; .02 INJECTION, SOLUTION INTRAVENOUS at 16:07

## 2018-02-15 RX ADMIN — ONDANSETRON 4 MG: 2 INJECTION INTRAMUSCULAR; INTRAVENOUS at 14:18

## 2018-02-15 RX ADMIN — NYSTATIN: 100000 POWDER TOPICAL at 16:16

## 2018-02-15 RX ADMIN — CLINDAMYCIN HYDROCHLORIDE 300 MG: 150 CAPSULE ORAL at 20:06

## 2018-02-15 RX ADMIN — METOPROLOL TARTRATE 5 MG: 1 INJECTION, SOLUTION INTRAVENOUS at 05:56

## 2018-02-15 RX ADMIN — METOPROLOL TARTRATE 2.5 MG: 1 INJECTION, SOLUTION INTRAVENOUS at 14:06

## 2018-02-15 RX ADMIN — FENTANYL CITRATE 25 MCG: 50 INJECTION INTRAMUSCULAR; INTRAVENOUS at 14:42

## 2018-02-15 RX ADMIN — METOPROLOL TARTRATE 100 MG: 100 TABLET ORAL at 20:06

## 2018-02-15 RX ADMIN — SODIUM CHLORIDE 8 MG/HR: 9 INJECTION, SOLUTION INTRAVENOUS at 16:58

## 2018-02-15 RX ADMIN — APIXABAN 2.5 MG: 2.5 TABLET, FILM COATED ORAL at 17:41

## 2018-02-15 RX ADMIN — EPHEDRINE SULFATE 5 MG: 50 INJECTION, SOLUTION INTRAMUSCULAR; INTRAVENOUS; SUBCUTANEOUS at 14:01

## 2018-02-15 RX ADMIN — LIDOCAINE HYDROCHLORIDE 50 MG: 10 INJECTION, SOLUTION INFILTRATION; PERINEURAL at 14:01

## 2018-02-15 RX ADMIN — SUCCINYLCHOLINE CHLORIDE 100 MG: 20 INJECTION, SOLUTION INTRAMUSCULAR; INTRAVENOUS at 14:01

## 2018-02-15 RX ADMIN — SODIUM CHLORIDE: 0.9 INJECTION, SOLUTION INTRAVENOUS at 13:46

## 2018-02-15 RX ADMIN — NYSTATIN: 100000 POWDER TOPICAL at 20:29

## 2018-02-15 NOTE — ASSESSMENT & PLAN NOTE
· Patient recently had a wound culture completed of a left lower extremity wound by Dermatology    Culture results faxed and reviewed positive for Staphylococcus aureus sensitive to clindamycin  · Will continue clindamycin as ordered  · Dermatology recommended since he will to lower extremities, will continue  · Recommend outpatient follow-up after discharge with Dermatology    · Continue daily Claritin

## 2018-02-15 NOTE — ASSESSMENT & PLAN NOTE
· Present on admission, suspect secondary to hypotension, AFib with RVR, lisinopril with hypotension causing on a regulatory failure per Nephrology  · serum creatinine improved to 1 50 (peak 4 5), unclear of exact baseline but suspect 1 1-1 3  · continue to hold Zestril/Lasix, plan per Nephrology reviewed    See progress note from today by Dr Horace Gray  · Renal ultrasound negative for obstructive etiology/hydronephrosis   · Avoid nephrotoxins

## 2018-02-15 NOTE — ASSESSMENT & PLAN NOTE
· Patient with history of permanent atrial fibrillation  · Cardiology, plan of care discussed  · Heart rate currently controlled, with occasional episodes of tachycardia noted on telemetry, on metoprolol 100 mg twice daily  · Eliquis dose reduced to 2 5 mg b i d  due to renal function  · Digoxin discontinued due to renal function and risk of toxicity

## 2018-02-15 NOTE — ANESTHESIA PREPROCEDURE EVALUATION
Review of Systems/Medical History  Patient summary reviewed  Chart reviewed  No history of anesthetic complications     Cardiovascular  EKG reviewed, Exercise tolerance: poor,  Hypertension , No CAD, , No cardiac stents  CHF compensated CHF,    Pulmonary  No asthma: , No shortness of breath, No recent URI , No sleep apnea ,        GI/Hepatic            Endo/Other     GYN       Hematology   Musculoskeletal       Neurology   Psychology         Lab Results   Component Value Date    WBC 6 24 02/14/2018    HGB 12 1 02/14/2018     02/14/2018     Lab Results   Component Value Date     02/15/2018    K 4 1 02/15/2018    BUN 60 (H) 02/15/2018    CREATININE 1 50 (H) 02/15/2018    GLUCOSE 97 02/15/2018     Lab Results   Component Value Date    PTT 35 02/12/2018      Lab Results   Component Value Date    INR 1 48 (H) 02/12/2018         Lab Results   Component Value Date    HGBA1C 6 0 (H) 06/12/2015       Indications: Afib     Diagnoses: I48 0 - Atrial fibrillation     Sonographer:  SEMAJ Jimenez  Primary Physician:  Franklyn Brink DO  Referring Physician:  Melinda Hubbard PA-C  Group:  [facility]  Interpreting Physician:  Randalyn Cabot, MD     SUMMARY     LEFT VENTRICLE:  The ventricle was dilated at 6 6 cm  Systolic function was mildly reduced  Ejection fraction was estimated to be 45 %  There was mild diffuse hypokinesis  Cannot rule out specific regional variations due to poor endocardial visualization    Wall thickness was mildly increased      AORTIC VALVE:  There was mild regurgitation      AORTA:  The root exhibited dilatation up to 4 4 cm      HISTORY: PRIOR HISTORY: Afib w/ RVR; chest pain, hypertension, heart failure, malignant neoplasm of skin  Physical Exam    Airway    Mallampati score: II  TM Distance: >3 FB       Dental       Cardiovascular      Pulmonary      Other Findings  edentuluous      Anesthesia Plan  ASA Score- 4 Emergent    Anesthesia Type- general with ASA Monitors  Additional Monitors:   Airway Plan: ETT  Comment:  Aliene Lennox, M D , have personally seen and evaluated the patient prior to anesthetic care  I have reviewed the pre-anesthetic record, and other medical records if appropriate to the anesthetic care  If a CRNA is involved in the case, I have reviewed the CRNA assessment, if present, and agree  Risks/benefits and alternatives discussed with patient including possible PONV, sore throat, and possibility of rare anesthetic and surgical emergencies        Plan Factors-Patient not instructed to abstain from smoking on day of procedure  Patient did not smoke on day of surgery  Induction- intravenous and rapid sequence induction  Postoperative Plan- Plan for postoperative opioid use  Planned trial extubation    Informed Consent- Anesthetic plan and risks discussed with patient  I personally reviewed this patient with the CRNA  Discussed and agreed on the Anesthesia Plan with the MARY Turner

## 2018-02-15 NOTE — ASSESSMENT & PLAN NOTE
· Without acute exacerbation  · Cardiology following  · Diuretics on hold due to JOHN, per Renal may resume torsemide 10mg daily beginning tomorrow if creatinine [email or web address]  · CXR negative for pulmonary edema/vascular congestion - echocardiogram EF 45% (prior 50%)   · continue metoprolol 100 mg b i d   · Consider resuming lisinopril tomorrow if creatinine returns to normal per renal  · Monitor I/Os and daily weights, weights stable  · titrate off oxygen to maintain saturations greater than 92%

## 2018-02-15 NOTE — PROGRESS NOTES
Progress Note - Nephrology   Francis Patino 80 y o  male MRN: [id number]  Unit/Bed#: -01 Encounter: [id number]    ASSESSMENT AND PLAN:  Patient is a 80-year-old male with history of hypertension, nonischemic cardiomyopathy with chronic CHF, EF previously 50%, AFib, comes with complaint of itching   He was found to be AFib with RVR with hypotension   We are consulted for JOHN/CKD management  1   CKD stage 3:  Baseline creatinine is 1 1-1 3  Previous value was 1 1 mg/dL 2 years ago  2   JOHN/azotemia[de-identified]  Peak creatinine 4 5 mg/dL  The related to hypotension/atrial fibrillation with RVR with combination of lisinopril leading to auto regulatory failure and probable ATN  Patient's creatinine has improved nicely to 1 5 mg/dL  -renal ultrasound was unremarkable  -bladder scan insignificant  -UA:  Concentrated, 4-10 RBCs, trace proteinuria  -urine eosinophils was negative  Recommendations:  -repeat UA regarding microscopic hematuria  If persistent consideration for urology evaluation  -check UPC  -hold lisinopril   -consider Re initiating torsemide/diuretics in the next 1-2 days once creatinine returns to baseline  3  Electrolytes:  Mild hyperkalemia has resolved  Hyponatremia has also resolved  Most likely related to JOHN  4   Hypertension:  Acceptable at this time  Hold ACE-inhibitor for now  Reinitiate diuretics as outlined above in the next 1-2 days  5   Cardiomyopathy/CHF:  Ejection fraction 45%  Reinitiate diuretics in the next 1-2 days  I would start on torsemide 10 mg daily beginning tomorrow  Subjective:   Patient overall feels better  No chest pain, palpitations or shortness of Breath  No nausea vomiting or diarrhea  Urinating well  Slight worsening of lower extremity edema  Objective:     Vitals: Blood pressure 137/65, pulse 91, temperature 99 °F (37 2 °C), temperature source Oral, resp  rate 18, height 5' 10" (1 778 m), weight 105 kg (231 lb 11 3 oz), SpO2 90 %  ,Body mass index is 33 25 kg/m²      Weight (last 2 days)     Date/Time   Weight    02/15/18 0600  105 (231 7)    02/14/18 0600  105 (231 48)    02/13/18 0542  105 (231 92)                Intake/Output Summary (Last 24 hours) at 02/15/18 0948  Last data filed at 02/15/18 0600   Gross per 24 hour   Intake               50 ml   Output             2125 ml   Net            -2075 ml            Physical Exam: General:  Obese, no acute distress sitting out of bed  Skin:  No acute rash  Eyes:  No scleral icterus  ENT:  Moist mucous membranes  Neck:  Supple, no jugular venous distention  Chest:  Clear to auscultation  CVS:  No rubs or gallops appreciated  Abdomen:  Obese, soft and nontender with normal bowel sounds  Extremities:  1-2 +lower extremity edema 1/2 the way up the pretibial region bilaterally  Neuro:  Grossly intact  Psych:  Alert and oriented                Medications:    Scheduled Meds:  Current Facility-Administered Medications:  acetaminophen 650 mg Oral Q4H PRN Sandra Hoyt PA-C   albuterol 2 puff Inhalation Q4H PRN Sandra Hoyt, PA-C   apixaban 2 5 mg Oral BID Sandra Hoyt, PA-C   aspirin 81 mg Oral Daily Sandra AMRITA Hoyt, KATE   calcium carbonate 1,000 mg Oral Daily PRN Sandra Hoyt PA-C   citalopram 10 mg Oral Daily Sandra A Lai, PA-C   clindamycin 300 mg Oral Q12H [facility] Nathan Odom PA-C   collagenase  Topical Daily Nathan Chaney PA-C   diphenhydrAMINE-zinc acetate  Topical TID PRN Ze RifKATE españa   loratadine 10 mg Oral Daily Sandra Hoyt PA-C   metoprolol 5 mg Intravenous Q6H PRN Man [place] PA-C   metoprolol tartrate 100 mg Oral Q12H [facility] Cinthia Nassar MD   nystatin  Topical TID Ze Riftaco, KATE   ondansetron 4 mg Intravenous Q6H PRN Sandra Hoyt, KATE   oxybutynin 5 mg Oral BID Sandra Hoyt, PA-C   senna 1 tablet Oral Daily Sandra A Hillegass, PA-C       PRN Meds:   acetaminophen    albuterol    calcium carbonate   diphenhydrAMINE-zinc acetate    metoprolol    ondansetron    Continuous Infusions:     Lab, Imaging and other studies: I have personally reviewed pertinent labs  Laboratory Results:    Results from last 7 days  Lab Units 02/15/18  0603 02/14/18  0547 02/13/18  0530 02/12/18  0524 02/11/18  1806   WBC Thousand/uL  --  6 24  --  8 61 10 13   HEMOGLOBIN g/dL  --  12 1  --  14 6 15 7   HEMATOCRIT %  --  36 4*  --  44 6 48 3   PLATELETS Thousands/uL  --  201  --  285 292   SODIUM mmol/L 139 136 132* 135* 136   POTASSIUM mmol/L 4 1 4 5 5 0 5 0 4 5   CHLORIDE mmol/L 105 100 96* 95* 94*   CO2 mmol/L 27 27 25 25 29   BUN mg/dL 60* 72* 82* 51* 39*   CREATININE mg/dL 1 50* 2 24* 4 06* 4 58* 3 78*   CALCIUM mg/dL 8 8 8 5 8 2* 8 4 9 3   MAGNESIUM mg/dL  --   --  1 9 1 9  --    PHOSPHORUS mg/dL  --   --  4 4*  --   --    TOTAL PROTEIN g/dL  --   --  6 0*  --  7 2   GLUCOSE RANDOM mg/dL 97 94 121 119 111     Urinalysis: Lab Results   Component Value Date    COLORU Yellow 02/13/2018    CLARITYU Slightly Cloudy 02/13/2018    SPECGRAV >=1 030 02/13/2018    PHUR 5 0 02/13/2018    LEUKOCYTESUR Negative 02/13/2018    NITRITE Negative 02/13/2018    PROTEINUA Trace (A) 02/13/2018    GLUCOSEU Negative 02/13/2018    KETONESU Negative 02/13/2018    BILIRUBINUR Negative 02/13/2018    BLOODU Large (A) 02/13/2018     ABGs: No results found for: [address]  Radiology review:     Portions of the record may have been created with voice recognition software   Occasional wrong word or "sound a like" substitutions may have occurred due to the inherent limitations of voice recognition software   Read the chart carefully and recognize, using context, where substitutions have occurred

## 2018-02-15 NOTE — CASE MANAGEMENT
Continued Stay Review    Date: 02/15/2018    Vital Signs: /89 (BP Location: Right arm)   Pulse (!) 117   Temp 99 °F (37 2 °C) (Oral)   Resp 18   Ht 5' 10" (1 778 m)   Wt 105 kg (231 lb 11 3 oz)   SpO2 90%   BMI 33 25 kg/m²     Medications:   Scheduled Meds:   Current Facility-Administered Medications:  apixaban 2 5 mg Oral BID   aspirin 81 mg Oral Daily   citalopram 10 mg Oral Daily   clindamycin 300 mg Oral Q12H [address]   collagenase  Topical Daily   loratadine 10 mg Oral Daily   metoprolol tartrate 100 mg Oral Q12H LAKESHIA   nystatin  Topical TID   oxybutynin 5 mg Oral BID   senna 1 tablet Oral Daily   [START ON 2/16/2018] torsemide 10 mg Oral Daily     Abnormal Labs/Diagnostic Results:     Age/Sex: 80 y o  male     Assessment/Plan:     Dysphagia   Assessment & Plan     · Call from nursing this afternoon the patient was noted to possibly choke" on his lunch   · Review of history and physical indicate on admission patient admitted that he had chest pain and a pressure-like sensation in his lower chest mid epigastrium of which he vomited and then the symptoms of chest pain had resolved  · Speech therapy consult ordered  Prior to her evaluation patient was spitting all his saliva and mucus like secretions in a basin  He stated that occasionally has difficulty with swallowing his food    Denied any respiratory difficulty or tightness in his chest  · Maintain NPO status until seen and evaluated by GI  · Consult GI for possible consideration of EGD  · Start Protonix          * Atrial fibrillation with RVR ([facility] )   Assessment & Plan     · Patient with history of permanent atrial fibrillation  · Cardiology following recommendations appreciated  · Heart rate currently controlled on metoprolol 100 mg twice daily  · Eliquis dose reduced to 2 5 mg b i d  due to renal function  · Digoxin discontinued due to renal function and risk of toxicity           Ac[facility] ([facility] )   Assessment & Plan     · Present on admission, suspect secondary to hypotension, AFib with RVR, lisinopril with hypotension causing on a regulatory failure per Nephrology  · serum creatinine improved to 2 24, unclear of exact baseline but suspect 1 1-1 3  · continue to hold Zestril/Lasix   · Renal ultrasound negative for obstructive etiology/hydronephrosis   · Avoid nephrotoxins          Chronic systolic CHF (congestive heart failure) ([facility] )   Assessment & Plan     · Without acute exacerbation  · Appreciate Cardiology consultation and recommendations   · CXR negative for pulmonary edema/vascular congestion - echocardiogram EF 45% (prior 50%)   · Zestril/Lasix/Digoxin held due to acute kidney injury - continue metoprolol 100 mg b i d   · Monitor I/Os and daily weights, weights stable  · Discussed with nursing to titrate off oxygen to maintain saturations greater than 92%             Hypertension   Assessment & Plan     · Blood pressure acceptable  · Continue Lopressor  · Zestril on hold due to JOHN           Rash   Assessment & Plan     · Complains of pruritic and mildly erythematic rash over hands, abdomen/trunk and back follow-up with Dermatology  · Patient's daughter stated that the dermatologist called her today to state that he has a bacterial infection from a swab that they took during his last visit      · Patient started on clindamycin 300 mg every 12 hours and santyl ointment to bilateral lower extremities  · P r n  diphenhydramine cream for itch  · Continue daily Claritin          Depression   Assessment & Plan     · Mood appears stable  · Continue Celexa             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   Assessment & Plan     · Recent excision of cancerous skin lesion on L knee  · Daily wound management with hydrogen peroxide and vaseline per patient's dermatologist          Hyponatremia   Assessment & Plan     · Nephrology following  · Suspect hypervolemic hyponatremia  · Continue fluid restriction 1 2 L per day  · Diuretics placed on hold  · Monitor BMP             VTE Pharmacologic Prophylaxis:   Pharmacologic: Apixaban (Eliquis)  Mechanical VTE Prophylaxis in Place: No    Current Length of Stay: 4 day(s)     Current Patient Status: Inpatient   Certification Statement: The patient will continue to require additional inpatient hospital stay due to need for GI consult regarding dysphagia, monitoring creatinine     Discharge Plan:  Home when medically stable and cleared from a nephrology standpoint and Cardiology    Cardiology:    Assessment/Plan:     1  Chronic systolic HF/NICM  Repeat echo does not show significant change in LV function, EF previously 50%, now currently estimated at 45%  Continue beta blocker as tolerated  Not on Lisinopril currently due to renal dysfunction  Was on 2 5 mg daily prior to admission  Diuretic also held (lasix 40 bid)       2  Permanent afib  HR mostly controlled with high dose Metoprolol, intermittent spikes to 150, but may be related to vomiting/ambulation  Eliquis reduced to 2 5 bid due to renal dysfunction  Digoxin stopped due to renal dysfunction and concern for risk of digoxin toxicity  If creatinine normalizes, could resume digoxin, as BP likely will not tolerate addition of Diltiazem for rate control      3  HTN  BP controlled on Metoprolol  Resume Lisinopril 2 5 if able once creatinine stabilizes      4  JOHN/CKD III  Creatinine improving dramatically over last 2 days, now closer to his previous baseline of 1 18 in 4/16 (today 1 5, down from peak of 4 58)  May have had AIN given rash on presentation  Urinating well currently without diuretics, would recommend resuming home diuretic at discharge to avoid volume overload in future (was on Lasix 40 bid prior to admission)  Nephrology:    1  CKD stage 3:  Baseline creatinine is 1 1-1 3  Previous value was 1 1 mg/dL 2 years ago  2   JOHN/azotemia[de-identified]  Peak creatinine 4 5 mg/dL    The related to hypotension/atrial fibrillation with RVR with combination of lisinopril leading to auto regulatory failure and probable ATN  Patient's creatinine has improved nicely to 1 5 mg/dL  -renal ultrasound was unremarkable  -bladder scan insignificant  -UA:  Concentrated, 4-10 RBCs, trace proteinuria  -urine eosinophils was negative  Recommendations:  -repeat UA regarding microscopic hematuria  If persistent consideration for urology evaluation  -check UPC  -hold lisinopril   -consider Re initiating torsemide/diuretics in the next 1-2 days once creatinine returns to baseline  3  Electrolytes:  Mild hyperkalemia has resolved  Hyponatremia has also resolved  Most likely related to JOHN  4   Hypertension:  Acceptable at this time  Hold ACE-inhibitor for now  Reinitiate diuretics as outlined above in the next 1-2 days  5   Cardiomyopathy/CHF:  Ejection fraction 45%  Reinitiate diuretics in the next 1-2 days  I would start on torsemide 10 mg daily beginning tomorrow

## 2018-02-15 NOTE — PROGRESS NOTES
Progress Note - Murphy Delgadillo 1935, 80 y o  male MRN: [id number]    Unit/Bed#: -01 Encounter: [id number]    Primary Care Provider: Kelsea Delgadillo DO   Date and time admitted to hospital: 2/11/2018  5:21 PM        Dysphagia with suspected food impaction   Assessment & Plan    · Appreciate GI consultation recommendations, plan of care discussed with GI  · Yesterday patient was noted to have difficulty with swallowing his food, denied vomiting but he was regurgitating his lunch and was not swallowing any saliva  · Review of history and physical indicate on admission patient admitted that he had chest pain and a pressure-like sensation in his lower chest mid epigastrium of which he vomited and then the symptoms of chest pain had resolved  · Plan for EGD today, evaluate for structural abnormality of the esophagus including stricture, ring or web    · Maintain NPO status  · Hold anticoagulation last dose was yesterday morning  · May require repeat speech evaluation  · Discussed with patient's daughter-in-law at bedside will continue to monitor closely Call from nursing this afternoon the patient was noted to possibly choke" on his lunch   · Continue Protonix        * At[facility] )   Assessment & Plan    · Patient with history of permanent atrial fibrillation  · Cardiology, plan of care discussed  · Heart rate currently controlled, with occasional episodes of tachycardia noted on telemetry, on metoprolol 100 mg twice daily  · Eliquis dose reduced to 2 5 mg b i d  due to renal function  · Digoxin discontinued due to renal function and risk of toxicity         Acute kidney injury ([facility] )   Assessment & Plan    · Present on admission, suspect secondary to hypotension, AFib with RVR, lisinopril with hypotension causing on a regulatory failure per Nephrology  · serum creatinine improved to 1 50 (peak 4 5), unclear of exact baseline but suspect 1 1-1 3  · continue to hold Zestril/Lasix, plan per Nephrology reviewed  See progress note from today by Dr Beatris Hatchet  · Renal ultrasound negative for obstructive etiology/hydronephrosis   · Avoid nephrotoxins        Chronic systolic CHF (congestive heart failure) ([address] )   Assessment & Plan    · Without acute exacerbation  · Cardiology following  · Diuretics on hold due to JOHN, per Renal may resume torsemide 10mg daily beginning tomorrow if creatinine [facility]  · CXR negative for pulmonary edema/vascular congestion - echocardiogram EF 45% (prior 50%)   · continue metoprolol 100 mg b i d   · Consider resuming lisinopril tomorrow if creatinine returns to normal per renal  · Monitor I/Os and daily weights, weights stable  · titrate off oxygen to maintain saturations greater than 92%          Hypertension   Assessment & Plan    · Blood pressure acceptable  · Continue Lopressor  · Zestril on hold due to JOHN         Staph aureus infection of the left lower extremity   Assessment & Plan    · Patient recently had a wound culture completed of a left lower extremity wound by Dermatology  Culture results faxed and reviewed positive for Staphylococcus aureus sensitive to clindamycin  · Will continue clindamycin as ordered  · Dermatology recommended since he will to lower extremities, will continue  · Recommend outpatient follow-up after discharge with Dermatology    · Continue daily Claritin        Depression   Assessment & Plan    · Mood appears stable  · Continue Celexa          Hyponatremiaresolved as of 2/15/2018   Assessment & Plan    · Nephrology following  · Suspect hypervolemic hyponatremia  · Continue fluid restriction 1 2 L per day  · Diuretics main on hold  · Monitor BMP          VTE Pharmacologic Prophylaxis:   Pharmacologic: Apixaban (Eliquis)  Mechanical VTE Prophylaxis in Place: No    Patient Centered Rounds: I have performed bedside rounds with nursing staff today      Discussions with Specialists or Other Care Team Provider: cardiology, GI    Education and Discussions with Family / Patient: patient and spoke with daughter    Time Spent for Care: 30 minutes  More than 50% of total time spent on counseling and coordination of care as described above  Current Length of Stay: 4 day(s)    Current Patient Status: Inpatient   Certification Statement: The patient will continue to require additional inpatient hospital stay due to IV protonix for GIB    Discharge Plan: home with [facility] when cleared by GI    Code Status: Level 1 - Full Code      Subjective:   Reports no further difficulty with swallowing  Handling secretions today  No pain in chest, +[place], no CP, HA, or dizziness    Objective:     Vitals:   Temp (24hrs), Av 1 °F (37 3 °C), Min:98 9 °F (37 2 °C), Max:99 3 °F (37 4 °C)    HR:  [] 117  Resp:  [18] 18  BP: (132-146)/(65-94) 138/89  SpO2:  [90 %-95 %] 90 %  Body mass index is 33 25 kg/m²  Input and Output Summary (last 24 hours): Intake/Output Summary (Last 24 hours) at 02/15/18 1335  Last data filed at 02/15/18 1008   Gross per 24 hour   Intake               50 ml   Output             2200 ml   Net            -2150 ml       Physical Exam:     Physical Exam   Constitutional: He is oriented to person, place, and time  He appears well-developed and well-nourished  No distress  HENT:   Head: Normocephalic  Cardiovascular: Normal rate  An irregular rhythm present  Pulmonary/Chest: Effort normal  No respiratory distress  Abdominal: Soft  Bowel sounds are normal  He exhibits distension  There is no tenderness  Distended round obese abd   Neurological: He is alert and oriented to person, place, and time  No cranial nerve deficit  Skin: Skin is warm and dry  dressing dry and intact left knee  BLE with excoriations and PVD discoloration    Psychiatric: He has a normal mood and affect  His behavior is normal    Vitals reviewed        Additional Data:     Labs:      Results from last 7 days  Lab Units 18  0547   WBC Thousand/uL 6 24   HEMOGLOBIN g/dL 12 1   HEMATOCRIT % 36 4*   PLATELETS Thousands/uL 201   NEUTROS PCT % 73   LYMPHS PCT % 14   MONOS PCT % 5   EOS PCT % 8*       Results from last 7 days  Lab Units 02/15/18  0603  02/13/18  0530   SODIUM mmol/L 139  < > 132*   POTASSIUM mmol/L 4 1  < > 5 0   CHLORIDE mmol/L 105  < > 96*   CO2 mmol/L 27  < > 25   BUN mg/dL 60*  < > 82*   CREATININE mg/dL 1 50*  < > 4 06*   CALCIUM mg/dL 8 8  < > 8 2*   TOTAL PROTEIN g/dL  --   --  6 0*   BILIRUBIN TOTAL mg/dL  --   --  1 30*   ALK PHOS U/L  --   --  31*   ALT U/L  --   --  42   AST U/L  --   --  88*   GLUCOSE RANDOM mg/dL 97  < > 121   < > = values in this interval not displayed  Results from last 7 days  Lab Units 02/12/18  0524   INR  1 48*       * I Have Reviewed All Lab Data Listed Above  * Additional Pertinent Lab Tests Reviewed:  All Labs Within Last 24 Hours Reviewed    Imaging:    Imaging Reports Reviewed Today Include: none  Imaging Personally Reviewed by Myself Includes:  none    Recent Cultures (last 7 days):           Last 24 Hours Medication List:     Current Facility-Administered Medications:  acetaminophen 650 mg Oral Q4H PRN Sandra Hoyt PA-C   albuterol 2 puff Inhalation Q4H PRN Sandra Hoyt PA-C   apixaban 2 5 mg Oral BID Sandra Hoyt PA-C   aspirin 81 mg Oral Daily Sandra Hoyt PA-C   calcium carbonate 1,000 mg Oral Daily PRN Sandra Hoyt PA-C   citalopram 10 mg Oral Daily Sandra Hoyt PA-C   clindamycin 300 mg Oral Q12H [address] Nathan BRIAN Odom PA-C   collagenase  Topical Daily Nathan Carrizales PA-C   diphenhydrAMINE-zinc acetate  Topical TID PRN Liza Mena PA-C   loratadine 10 mg Oral Daily Sandra Hoyt PA-C   metoprolol 5 mg Intravenous Q6H PRN Karilyn Gerold, PA-C   metoprolol tartrate 100 mg Oral Q12H [address] Chay Downs MD   nystatin  Topical TID Liza Mena PA-C   ondansetron 4 mg Intravenous Q6H PRN Sandra Hoyt PA-C   oxybutynin 5 mg Oral BID Sandra Hoyt PA-C   senna 1 tablet Oral Daily Sandra Hoyt PA-C   [START ON 2018] torsemide 10 mg Oral Daily Roverto Andersen MD        Today, Patient Was Seen By: TAQUERIA Gay    ** Please Note: Dictation voice to text software may have been used in the creation of this document   **

## 2018-02-15 NOTE — MEDICAL STUDENT
[facility] Progress Note  Patient: Karuna Johnson 80 y o  male   MRN: [id number]  PCP: Lurdes Delvalle DO  Unit/Bed#: MS [facility] Encounter: [id number]  Date Of Visit: 02/15/18    Assessment:    Principal Problem:    Atrial fibrillation with RVR ([address] )   HR 140bpm on admission- placed on cardizem gtt in ED since d/c'd  EKG 2/11 showed a-fib with RVR, non-specific ST segment elevations,  troponins mildly elevated, 0 05, 0 06, 0 06  Continue eliquis and digoxin  Dig level 1 1  HR stable 80-90's  Continue telemetry  Continue eliquis and digoxin  2/13 ECHO EF 45%, no pericardial effusion, mild aortic regurg with dilatation 4 4cm, no evidence of stenosis which is no change from June 2016 Echo  Cardiology following  Continue Metoprolol 100mg PO Q12      Active Problems:    Hypertension  's  Hold lisinopril for JOHN    Dysphagia:  Nsg reported pt was having difficulty swallowing yesterday described as choking on his food after expectorating moderate amount of clear mucous  Speech consulted- suspect esophageal food impaction   MBS ordered   GI consulted - NPO until GI consult complete possible EGD   CXR on admission showed lungs are clear   Low grade temps overnight repeat CXR for possible aspiration      Chronic systolic CHF (congestive heart failure) (HCC)  O2 saturation < 90%      Acute kidney injury (HCC)  Renal ultrasound benign  Hold nephrotoxic agents   Hold lisinopril       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    Depression      Rash  Low grade fevers overnight 99 3F      Stage 3 chronic kidney disease  · Creatinine 1 5, improved  · Baseline creatinine 1 1-1 3  · Continue to hold diuretics for 48hrs  · Nephrology following, appreciate input      Hyponatremia      Azotemia      Dysphagia      Plan:    ·        VTE Pharmacologic Prophylaxis:   Pharmacologic: Apixaban (Eliquis)  Mechanical VTE Prophylaxis in Place: Yes    Patient Centered Rounds: I have performed bedside rounds with nursing staff today  Discussions with Specialists or Other Care Team Provider:     Education and Discussions with Family / Patient: discussed care at beside with pt  Time Spent for Care: 30 minutes  More than 50% of total time spent on counseling and coordination of care as described above  Current Length of Stay: 4 day(s)    Current Patient Status: Inpatient   Certification Statement: The patient will continue to require additional inpatient hospital stay due to 0    Discharge Plan / Estimated Discharge Date:     Code Status: Level 1 - Full Code      Subjective:       Objective:     Vitals:   Temp (24hrs), Av 1 °F (37 3 °C), Min:98 9 °F (37 2 °C), Max:99 3 °F (37 4 °C)    HR:  [] 91  Resp:  [18] 18  BP: (132-146)/(65-94) 137/65  SpO2:  [90 %-95 %] 90 %  Body mass index is 33 25 kg/m²  Input and Output Summary (last 24 hours):        Intake/Output Summary (Last 24 hours) at 02/15/18 1049  Last data filed at 02/15/18 1008   Gross per 24 hour   Intake               50 ml   Output             2350 ml   Net            -2300 ml       Physical Exam:     Physical Exam    (  Be Sure to Include Physical Exam: Delete this entire line when you have entered your exam)    Additional Data:     Labs:      Results from last 7 days  Lab Units 18  0547   WBC Thousand/uL 6 24   HEMOGLOBIN g/dL 12 1   HEMATOCRIT % 36 4*   PLATELETS Thousands/uL 201   NEUTROS PCT % 73   LYMPHS PCT % 14   MONOS PCT % 5   EOS PCT % 8*       Results from last 7 days  Lab Units 02/15/18  0603  18  0530   SODIUM mmol/L 139  < > 132*   POTASSIUM mmol/L 4 1  < > 5 0   CHLORIDE mmol/L 105  < > 96*   CO2 mmol/L 27  < > 25   BUN mg/dL 60*  < > 82*   CREATININE mg/dL 1 50*  < > 4 06*   CALCIUM mg/dL 8 8  < > 8 2*   TOTAL PROTEIN g/dL  --   --  6 0*   BILIRUBIN TOTAL mg/dL  --   --  1 30*   ALK PHOS U/L  --   --  31*   ALT U/L  --   --  42   AST U/L  --   --  88*   GLUCOSE RANDOM mg/dL 97  < > 121   < > = values in this interval not displayed  Results from last 7 days  Lab Units 02/12/18  0524   INR  1 48*       * I Have Reviewed All Lab Data Listed Above  * Additional Pertinent Lab Tests Reviewed: [facility] Admission Reviewed    Imaging:    Imaging Reports Reviewed Today Include: Imaging Personally Reviewed by Myself Includes:     Recent Cultures (last 7 days):           Last 24 Hours Medication List:     Current Facility-Administered Medications:  acetaminophen 650 mg Oral Q4H PRN Sandra Hoyt PA-C   albuterol 2 puff Inhalation Q4H PRN Sandra Hoyt PA-C   apixaban 2 5 mg Oral BID Sandra Hoyt PA-C   aspirin 81 mg Oral Daily Sandra Hoyt PA-C   calcium carbonate 1,000 mg Oral Daily PRN Sandra Hoyt PA-C   citalopram 10 mg Oral Daily Sandra Hoyt PA-C   clindamycin 300 mg Oral Q12H [address] Nathan Odom PA-C   collagenase  Topical Daily Nathan Evans PA-C   diphenhydrAMINE-zinc acetate  Topical TID PRN Cuauhtemoc Fontaine PA-C   loratadine 10 mg Oral Daily Sandra Hoyt PA-C   metoprolol 5 mg Intravenous Q6H PRN Coby Miller PA-C   metoprolol tartrate 100 mg Oral Q12H [address] Charleen Lopez MD   nystatin  Topical TID Cuauhtemoc Fontaine PA-C   ondansetron 4 mg Intravenous Q6H PRN Sandra Hoyt PA-C   oxybutynin 5 mg Oral BID Sandra Hoyt PA-C   senna 1 tablet Oral Daily Sandra Hoyt PA-C   [START ON 2/16/2018] torsemide 10 mg Oral Daily Charleen Lopez MD        Today, Patient Was Seen By: Yeny Escalera    ** Please Note: This note has been constructed using a voice recognition system   **

## 2018-02-15 NOTE — CONSULTS
Consultation - [facility]  Bernabe Kiran 80 y o  male MRN: [id number]  Unit/Bed#: -01 Encounter: [id number]         Reason for Consult / Principal Problem: Dysphagia    HPI: Abhishek Gonzalez is a 80year old male with a history of atrial fibrillation on Eliquis, CHF and CKD stage III  He is a somewhat unreliable historian, and much of the history was obtained by the patient and his daughter in law  Patient was admitted to the hospital for atrial fibrillation with RVR and acute on chronic kidney injury  Prior to admission, patient was complaining of generalized pruritis and had an episode of mid sternal chest pain with vomiting  We were consulted for a "choking" episode that occurred yesterday at lunch  He reports eating roast beef, broccoli and pudding  Shortly after taking a small bite of each, he began to "choke " He then began to spit up clear secretions into a basin  Speech pathology came to evaluate the patient  Per her evaluation, she had high suspicion for food impaction in the esophagus  She reported the patient regurgitated some pudding and juice  Very occassionally at home, he does experience the sensation of food getting stuck  However, he attributes this to not chewing food well or eating too quickly  Otherwise, he denies a chronic issue with dysphagia  He denies odynophagia or pyrosis  He denies unintentional weight loss  No prior EGD per patient and family  Last colonoscopy was likely over 10 years ago  Review of Systems:    CONSTITUTIONAL: Denies any fever, chills, or rigors  Good appetite, and no recent weight loss  HEENT: No earache or tinnitus  Denies hearing loss or visual disturbances  CARDIOVASCULAR: No chest pain or palpitations  RESPIRATORY: Denies any cough, hemoptysis, shortness of breath or dyspnea on exertion  GASTROINTESTINAL: As noted in the History of Present Illness  GENITOURINARY: No problems with urination   Denies any hematuria or dysuria  NEUROLOGIC: No dizziness or vertigo, denies headaches  MUSCULOSKELETAL: Denies any muscle or joint pain  SKIN: Positive for rash and itching  ENDOCRINE: Denies excessive thirst  Denies intolerance to heat or cold  PSYCHOSOCIAL: Denies depression or anxiety  Denies any recent memory loss  Historical Information   Past Medical History:   Diagnosis Date    Atrial fibrillation ([facility] [address] )     Hypertension     Macular degeneration      History reviewed  No pertinent surgical history  Social History   History   Alcohol Use No     History   Drug Use No     History   Smoking Status    Never Smoker   Smokeless Tobacco    Not on file     History reviewed  No pertinent family history       Meds/Allergies     Current Facility-Administered Medications   Medication Dose Route Frequency    acetaminophen (TYLENOL) tablet 650 mg  650 mg Oral Q4H PRN    albuterol (PROVENTIL HFA,VENTOLIN HFA) inhaler 2 puff  2 puff Inhalation Q4H PRN    apixaban (ELIQUIS) tablet 2 5 mg  2 5 mg Oral BID    aspirin (ECOTRIN LOW STRENGTH) EC tablet 81 mg  81 mg Oral Daily    calcium carbonate (TUMS) chewable tablet 1,000 mg  1,000 mg Oral Daily PRN    citalopram (CeleXA) tablet 10 mg  10 mg Oral Daily    clindamycin (CLEOCIN) capsule 300 mg  300 mg Oral Q12H [address]    collagenase (SANTYL) ointment   Topical Daily    diphenhydrAMINE-zinc acetate (BENADRYL) 2-0 1 % cream   Topical TID PRN    loratadine (CLARITIN) tablet 10 mg  10 mg Oral Daily    metoprolol (LOPRESSOR) injection 5 mg  5 mg Intravenous Q6H PRN    metoprolol tartrate (LOPRESSOR) tablet 100 mg  100 mg Oral Q12H LAKESHIA    nystatin (MYCOSTATIN) powder   Topical TID    ondansetron (ZOFRAN) injection 4 mg  4 mg Intravenous Q6H PRN    oxybutynin (DITROPAN) tablet 5 mg  5 mg Oral BID    senna (SENOKOT) tablet 8 6 mg  1 tablet Oral Daily    [START ON 2/16/2018] torsemide (DEMADEX) tablet 10 mg  10 mg Oral Daily       No Known Allergies      Objective     Blood pressure 137/65, pulse 91, temperature 99 °F (37 2 °C), temperature source Oral, resp  rate 18, height 5' 10" (1 778 m), weight 105 kg (231 lb 11 3 oz), SpO2 90 %  Intake/Output Summary (Last 24 hours) at 02/15/18 1117  Last data filed at 02/15/18 1008   Gross per 24 hour   Intake               50 ml   Output             2200 ml   Net            -2150 ml         PHYSICAL EXAM:      General Appearance:   Alert and oriented x 3  Cooperative, and in no respiratory distress   HEENT:   Normocephalic, atraumatic, anicteric      Neck:  Supple, symmetrical, trachea midline   Lungs:   Clear to auscultation bilaterally   Heart[de-identified]   Regular rate and rhythm   Abdomen:   Soft, non-tender, non-distended; normal bowel sounds; no masses, no organomegaly    Genitalia:   Deferred    Rectal:   Deferred    Extremities:  No cyanosis, clubbing or edema    Pulses:  2+ and symmetric all extremities        Lymph nodes:  No palpable cervical or supraclavicular lymphadenopathy        Lab Results:     Results from last 7 days  Lab Units 02/14/18  0547   WBC Thousand/uL 6 24   HEMOGLOBIN g/dL 12 1   HEMATOCRIT % 36 4*   PLATELETS Thousands/uL 201   NEUTROS PCT % 73   LYMPHS PCT % 14   MONOS PCT % 5   EOS PCT % 8*       Results from last 7 days  Lab Units 02/15/18  0603  02/13/18  0530   SODIUM mmol/L 139  < > 132*   POTASSIUM mmol/L 4 1  < > 5 0   CHLORIDE mmol/L 105  < > 96*   CO2 mmol/L 27  < > 25   BUN mg/dL 60*  < > 82*   CREATININE mg/dL 1 50*  < > 4 06*   CALCIUM mg/dL 8 8  < > 8 2*   TOTAL PROTEIN g/dL  --   --  6 0*   BILIRUBIN TOTAL mg/dL  --   --  1 30*   ALK PHOS U/L  --   --  31*   ALT U/L  --   --  42   AST U/L  --   --  88*   GLUCOSE RANDOM mg/dL 97  < > 121   < > = values in this interval not displayed  Results from last 7 days  Lab Units 02/12/18  0524   INR  1 48*           Imaging Studies: I have personally reviewed pertinent imaging studies      Xr Chest 1 View Portable    Result Date: 2/12/2018  Impression: Mild enlargement of cardiac silhouette  Clear lungs  Workstation performed: [id number]      Kidney And Bladder    Result Date: 2/12/2018  Impression: 1  No hydronephrosis or other acute findings to explain acute kidney injury  2   Simple renal cyst, bilaterally  No suspicious masses or lesions requiring imaging follow-up  Workstation performed: [id number]       ASSESSMENT and PLAN:    Principal Problem:    Atrial fibrillation with RVR ([address] )  Active Problems:    Hypertension    Chronic systolic CHF (congestive heart failure) (HCC)    Acute kidney injury (HCC)    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    Depression    Rash    Stage 3 chronic kidney disease    Hyponatremia    Azotemia    Dysphagia    Food impaction of esophagus    1 Dysphagia with suspected food impaction - Discussed with speech pathology  As we cannot safely have the patient undergo barium swallow with risk of possible aspiration, we will perform EGD to investigate  There may be underlying structural abnormality of the esophagus including stricture, ring or web  - NPO   - Continue to hold Ac, last given at 0800 yesterday   - Pending results of above, patient be need to be started on PPI therapy   - Speech pathology may re-evaluate the patient tomorrow      The patient was seen and examined by Dr Franklyn Thomson, all lopez medical decisions were made with Dr Franklyn Thomson  Thank you for allowing us to participate in the care of this pleasant patient  We will follow up with you closely

## 2018-02-15 NOTE — OP NOTE
**** GI/ENDOSCOPY REPORT ****     PATIENT NAME: DAVIDA ARAGON - VISIT ID:  Patient ID: [id number]   YOB: 1935     INTRODUCTION: Esophagogastroduodenoscopy - A 80 male patient presents for   an inpatient Esophagogastroduodenoscopy at [place] [address]  INDICATIONS: Food impaction  CONSENT: The benefits, risks, and alternatives to the procedure were   discussed and informed consent was obtained from the patient  PREPARATION:  EKG, pulse, pulse oximetry and blood pressure were monitored   throughout the procedure  MEDICATIONS: Anesthesia-check records     PROCEDURE:  The endoscope was passed without difficulty through the mouth   under direct visualization and advanced to the 2nd portion of the   duodenum  The scope was withdrawn and the mucosa was carefully examined  FINDINGS:   Esophagus: Impacted food material in distal esophagus was   genly pushed down with scope and noted erythema and ulceration underneath,   more at the GEJ  No strictures  Stomach: The stomach appeared to be   normal   Duodenum: Fresh blood clots in distal bulb and D2 washed off and   suctioned  Diffuse ulceration noted in the bulb extending down the   duodenal sweep with active bleeding  Attempted placing hemoclips but the   bleeding continued and was controlled with epinephrine injection and   cauterization with gold probe  COMPLICATIONS: There were no complications  IMPRESSIONS: Impacted food material in distal esophagus was genly pushed   down with scope and noted erythema and ulceration underneath, more at the   GEJ  No strictures  Normal stomach  Fresh blood clots in distal bulb and   D2 washed off and suctioned  Diffuse ulceration noted in the bulb   extending down the duodenal sweep with active bleeding  Attempted placing   hemoclips but the bleeding continued and was controlled with epinephrine   injection and cauterization with gold probe       RECOMMENDATIONS: IV protonix drip and Carafate; monitor h/h  ESTIMATED BLOOD LOSS:     PATHOLOGY SPECIMENS:     PROCEDURE CODES:     ICD-9 Codes:     ICD-10 Codes:     PERFORMED BY: JOHN Spear  on 02/15/2018  Version 1, electronically signed by JOHN Ordoñez  on 02/15/2018   at 14:27

## 2018-02-15 NOTE — OCCUPATIONAL THERAPY NOTE
Occupational Therapy Note        Patient Name: Colleen Gonzalez  MLDJU'L Date: 2/15/2018    Chart review completed  Pt currently off floor for EGD  Will continue to follow to complete OT treatment as appropriate and schedule allows      Mary Cadet OT

## 2018-02-15 NOTE — ASSESSMENT & PLAN NOTE
· Appreciate GI consultation recommendations, plan of care discussed with GI  · Yesterday patient was noted to have difficulty with swallowing his food, denied vomiting but he was regurgitating his lunch and was not swallowing any saliva  · Review of history and physical indicate on admission patient admitted that he had chest pain and a pressure-like sensation in his lower chest mid epigastrium of which he vomited and then the symptoms of chest pain had resolved  · Plan for EGD today, evaluate for structural abnormality of the esophagus including stricture, ring or web    · Maintain NPO status  · Hold anticoagulation last dose was yesterday morning  · May require repeat speech evaluation  · Discussed with patient's daughter-in-law at bedside will continue to monitor closely Call from nursing this afternoon the patient was noted to possibly choke" on his lunch   · Continue Protonix

## 2018-02-15 NOTE — ANESTHESIA POSTPROCEDURE EVALUATION
Post-Op Assessment Note      CV Status:  Stable    Mental Status:  Alert and awake    Hydration Status:  Euvolemic    PONV Controlled:  Controlled    Airway Patency:  Patent    Post Op Vitals Reviewed: Yes          Staff: MARY           BP (!) 189/80 (02/15/18 1426)    Temp (!) 97 4 °F (36 3 °C) (02/15/18 1426)    Pulse 102 (02/15/18 1426)   Resp 22 (02/15/18 1426)    SpO2 94 % (02/15/18 1426)

## 2018-02-15 NOTE — PLAN OF CARE
Problem: Potential for Falls  Goal: Patient will remain free of falls  INTERVENTIONS:  - Assess patient frequently for physical needs  -  Identify cognitive and physical deficits and behaviors that affect risk of falls  -  [place] fall precautions as indicated by assessment   - Educate patient/family on patient safety including physical limitations  - Instruct patient to call for assistance with activity based on assessment  - Modify environment to reduce risk of injury  - Consider OT/PT consult to assist with strengthening/mobility   Outcome: Progressing      Problem: CARDIOVASCULAR - ADULT  Goal: Maintains optimal cardiac output and hemodynamic stability  INTERVENTIONS:  - Monitor I/O, vital signs and rhythm  - Monitor for S/S and trends of decreased cardiac output i e  bleeding, hypotension  - Administer and titrate ordered vasoactive medications to optimize hemodynamic stability  - Assess quality of pulses, skin color and temperature  - Assess for signs of decreased coronary artery perfusion - ex   Angina  - Instruct patient to report change in severity of symptoms   Outcome: Progressing    Goal: Absence of cardiac dysrhythmias or at baseline rhythm  INTERVENTIONS:  - Continuous cardiac monitoring, monitor vital signs, obtain 12 lead EKG if indicated  - Administer antiarrhythmic and heart rate control medications as ordered  - Monitor electrolytes and administer replacement therapy as ordered   Outcome: Progressing      Problem: SKIN/TISSUE INTEGRITY - ADULT  Goal: Skin integrity remains intact  INTERVENTIONS  - Identify patients at risk for skin breakdown  - Assess and monitor skin integrity  - Assess and monitor nutrition and hydration status  - Monitor labs (i e  albumin)  - Assess for incontinence   - Turn and reposition patient  - Assist with mobility/ambulation  - Relieve pressure over bony prominences  - Avoid friction and shearing  - Provide appropriate hygiene as needed including keeping skin clean and dry  - Evaluate need for skin moisturizer/barrier cream  - Collaborate with interdisciplinary team (i e  Nutrition, Rehabilitation, etc )   - Patient/family teaching   Outcome: Progressing    Goal: Incision(s), wounds(s) or drain site(s) healing without S/S of infection  INTERVENTIONS  - Assess and document risk factors for skin impairment   - Assess and document dressing, incision, wound bed, drain sites and surrounding tissue  - Initiate Nutrition services consult and/or wound management as needed   Outcome: Progressing    Goal: Oral mucous membranes remain intact  INTERVENTIONS  - Assess oral mucosa and hygiene practices  - Implement preventative oral hygiene regimen  - Implement oral medicated treatments as ordered  - Initiate Nutrition services referral as needed   Outcome: Completed Date Met: 02/15/18      Problem: Prexisting or High Potential for Compromised Skin Integrity  Goal: Skin integrity is maintained or improved  INTERVENTIONS:  - Identify patients at risk for skin breakdown  - Assess and monitor skin integrity  - Assess and monitor nutrition and hydration status  - Monitor labs (i e  albumin)  - Assess for incontinence   - Turn and reposition patient  - Assist with mobility/ambulation  - Relieve pressure over bony prominences  - Avoid friction and shearing  - Provide appropriate hygiene as needed including keeping skin clean and dry  - Evaluate need for skin moisturizer/barrier cream  - Collaborate with interdisciplinary team (i e  Nutrition, Rehabilitation, etc )   - Patient/family teaching   Outcome: Progressing      Problem: DISCHARGE PLANNING - CARE MANAGEMENT  Goal: Discharge to post-acute care or home with appropriate resources  INTERVENTIONS:  - Conduct assessment to determine patient/family and health care team treatment goals, and need for post-acute services based on payer coverage, community resources, and patient preferences, and barriers to discharge  - Address psychosocial, clinical, and financial barriers to discharge as identified in assessment in conjunction with the patient/family and health care team  - Arrange appropriate level of post-acute services according to patient's   needs and preference and payer coverage in collaboration with the physician and health care team  - Communicate with and update the patient/family, physician, and health care team regarding progress on the discharge plan  - Arrange appropriate transportation to post-acute venues   Outcome: Progressing

## 2018-02-15 NOTE — PROGRESS NOTES
Progress Note - Cardiology   Peggy Rater 80 y o  male MRN: [id number]  Encounter: [id number]  02/15/18  11:01 AM        Assessment/Plan:    1  Chronic systolic HF/NICM  Repeat echo does not show significant change in LV function, EF previously 50%, now currently estimated at 45%  Continue beta blocker as tolerated  Not on Lisinopril currently due to renal dysfunction  Was on 2 5 mg daily prior to admission  Diuretic also held (lasix 40 bid)  2  Permanent afib  HR mostly controlled with high dose Metoprolol, intermittent spikes to 150, but may be related to vomiting/ambulation  Eliquis reduced to 2 5 bid due to renal dysfunction  Digoxin stopped due to renal dysfunction and concern for risk of digoxin toxicity  If creatinine normalizes, could resume digoxin, as BP likely will not tolerate addition of Diltiazem for rate control     3  HTN  BP controlled on Metoprolol  Resume Lisinopril 2 5 if able once creatinine stabilizes  4  JOHN/CKD III  Creatinine improving dramatically over last 2 days, now closer to his previous baseline of 1 18 in 4/16 (today 1 5, down from peak of 4 58)  May have had AIN given rash on presentation  Urinating well currently without diuretics, would recommend resuming home diuretic at discharge to avoid volume overload in future (was on Lasix 40 bid prior to admission)  Follows with Dr Anatoly Hester as outpt, follow up scheduled after discharge  Subjective/Objective   Chief Complaint:   Chief Complaint   Patient presents with    Rash     pts daughter reports pt was vomiting today and "itchy all over " pt has generalized rash  states c/p last night  pt reports generalized body aches  denies any fevers  Subjective: 80year old man with a history of NICM with EF 21%, chronic systolic HF, HTN, permanent afib on rate control, admitted due to chest discomfort and body rash, found to have worsening renal function with creatinine 3 78  Also found to be somewhat tachycardic  His home Lasix, digoxin, and Lisinopril have been held  Creatinine remains elevated from his prior baseline in 2016, but trending down significantly compared to admission  He denies any current chest pain, shortness of breath, LE edema, dizziness or lightheadedness  No fevers or chills  No cough  HR still at times mildly tachycardic  Daughter reports he has been having issues with tolerating PO  Unclear if he is vomiting up food or having difficulty swallowing it  GI consulted, EGD planned for later today  Echo 2/18 shows mildly reduced LV function, approximately 45%         Patient Active Problem List   Diagnosis    Hypertension    [facility]VR [facility])    Chronic systolic CHF (congestive heart failure) ([facility])    [facility] kidney [facility] [address] [facility]    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    Depression    Rash    Stage 3 chronic kidney disease    Hyponatremia    Azotemia    Dysphagia     Past Medical History:   Diagnosis Date    [facility] fibrillation [facility]     Hypertension     Macular degeneration        No Known Allergies    Current Facility-Administered Medications   Medication Dose Route Frequency Provider Last Rate Last Dose    acetaminophen (TYLENOL) tablet 650 mg  650 mg Oral Q4H PRN Sadnra Hoyt PA-C   650 mg at 02/13/18 2120    albuterol (PROVENTIL HFA,VENTOLIN HFA) inhaler 2 puff  2 puff Inhalation Q4H PRN Sandra Hoyt PA-C        apixaban (ELIQUIS) tablet 2 5 mg  2 5 mg Oral BID Sandra Hoyt PA-C   2 5 mg at 02/14/18 0825    aspirin (ECOTRIN LOW STRENGTH) EC tablet 81 mg  81 mg Oral Daily Sandra Hoyt PA-C   81 mg at 02/14/18 0825    calcium carbonate (TUMS) chewable tablet 1,000 mg  1,000 mg Oral Daily PRN Sandra Hoyt PA-C        citalopram (CeleXA) tablet 10 mg  10 mg Oral Daily Sandra Hoyt PA-C   10 mg at 02/14/18 0825    clindamycin (CLEOCIN) capsule 300 mg  300 mg Oral Q12H [address] Nathan Juan David Douglas PA-C        collagenase (SANTYL) ointment   Topical Daily Nathan Leigh PA-C        diphenhydrAMINE-zinc acetate (BENADRYL) 2-0 1 % cream   Topical TID PRN Aan Luisa Tapia PA-C        loratadine (CLARITIN) tablet 10 mg  10 mg Oral Daily Sandra Hoyt PA-C   10 mg at 02/14/18 0825    metoprolol (LOPRESSOR) injection 5 mg  5 mg Intravenous Q6H PRN Justyn Mart PA-C   5 mg at 02/15/18 0556    metoprolol tartrate (LOPRESSOR) tablet 100 mg  100 mg Oral Q12H [address] Yemi Chow MD   100 mg at 02/14/18 0825    nystatin (MYCOSTATIN) powder   Topical TID Ana Luisa Tapia PA-C        [facility]etron [facility]) injection 4 mg  4 mg Intravenous Q6H PRN Sandra Hoyt PA-C   4 mg at 02/12/18 2116    oxybutynin (DITROPAN) tablet 5 mg  5 mg Oral BID Sandra Hoyt PA-C   5 mg at 02/14/18 0825    senna (SENOKOT) tablet 8 6 mg  1 tablet Oral Daily Sandra Hoyt PA-C   8 6 mg at 02/14/18 0826    [START ON 2/16/2018] torsemide (DEMADEX) tablet 10 mg  10 mg Oral Daily Yemi Chow MD           Vitals: /65 (BP Location: Left arm)   Pulse 91   Temp 99 °F (37 2 °C) (Oral)   Resp 18   Ht 5' 10" (1 778 m)   Wt 105 kg (231 lb 11 3 oz)   SpO2 90%   BMI 33 25 kg/m²     Intake/Output Summary (Last 24 hours) at 02/15/18 1101  Last data filed at 02/15/18 1008   Gross per 24 hour   Intake               50 ml   Output             2350 ml   Net            -2300 ml     Wt Readings from Last 3 Encounters:   02/15/18 105 kg (231 lb 11 3 oz)   06/14/17 105 kg (231 lb 8 oz)   12/09/16 98 9 kg (218 lb)       Body mass index is 33 25 kg/m²  ,     Vitals:    02/14/18 1500 02/14/18 2255 02/15/18 0552 02/15/18 0706   BP: 144/71 132/94 146/67 137/65   Pulse: 71 92 (!) 139 91   Patient Position - Orthostatic VS: Sitting Lying Lying Lying       Physical Exam:     GEN: Awake and alert, in no acute distress      HEENT: Sclera anicteric, conjunctivae pink, mucous membranes moist   NECK: Supple, no carotid bruits, no significant JVD    HEART: Irregularly irregular rhythm, HR mildly tachycardic, no murmurs, clicks, gallops or rubs  LUNGS: Clear to auscultation bilaterally; no wheezes, rales, or rhonchi   ABDOMEN: Obese, soft, nontender, nondistended, normoactive bowel sounds  EXTREMITIES: Skin warm and well perfused, no clubbing, cyanosis, or edema  NEURO: No focal findings  SKIN: Normal without suspicious lesions on exposed skin  Resolving rash  Lab Results:     BMP:    Results from last 7 days  Lab Units 02/15/18  0603 02/14/18  0547 02/13/18  0530 02/12/18  0524 02/11/18  1806   SODIUM mmol/L 139 136 132* 135* 136   POTASSIUM mmol/L 4 1 4 5 5 0 5 0 4 5   CHLORIDE mmol/L 105 100 96* 95* 94*   CO2 mmol/L 27 27 25 25 29   BUN mg/dL 60* 72* 82* 51* 39*   CREATININE mg/dL 1 50* 2 24* 4 06* 4 58* 3 78*   GLUCOSE RANDOM mg/dL 97 94 121 119 111   CALCIUM mg/dL 8 8 8 5 8 2* 8 4 9 3       CBC:     Results from last 7 days  Lab Units 02/14/18  0547 02/12/18  0524 02/11/18  1806   WBC Thousand/uL 6 24 8 61 10 13   HEMOGLOBIN g/dL 12 1 14 6 15 7   HEMATOCRIT % 36 4* 44 6 48 3   MCV fL 95 95 95   PLATELETS Thousands/uL 201 285 292   MCH pg 31 4 31 1 30 8   MCHC g/dL 33 2 32 7 32 5   RDW % 13 3 14 0 14 0   MPV fL 9 8 10 8 10 4         Results from last 7 days  Lab Units 02/12/18  0047 02/11/18  2109 02/11/18  1806   TROPONIN I ng/mL 0 06* 0 06* 0 05*         Results from last 7 days  Lab Units 02/11/18  1806   NT-PRO BNP pg/mL 760*             Results from last 7 days  Lab Units 02/13/18  0530 02/12/18  0524   MAGNESIUM mg/dL 1 9 1 9       INR:     Results from last 7 days  Lab Units 02/12/18  0524 02/11/18  1806   INR  1 48* 1 28*       Lipid Profile:   No results found for: CHOL  No results found for: HDL  No results found for: LDLCALC  No results found for: TRIG      Hgb A1c:         Telemetry: personally reviewed, afib, HR in low 100s, occasional RVR to 150s         Cardiac testing:   Results for orders placed during the hospital encounter of 18   Echo complete with contrast if indicated    Narrative [address] [facility]  [phone]    Transthoracic Echocardiogram  2D, M-mode, Doppler, and Color Doppler    Study date:  2018    Patient: Nanci Flannery  MR number: [id number]  Account number: [de-identified]  : 1935  Age: 80 years  Gender: Male  Status: Inpatient  Location: Bedside  Height: 70 in  Weight: 231 4 lb  BP: 111/ 51 mmHg    Indications: Afib    Diagnoses: I48 0 - Atrial fibrillation    Sonographer:  SEMAJ Gallagher  Primary Physician:  Foreign Jaimes DO  Referring Physician:  Johnie Putnam PA-C  Group:  [facility]  Interpreting Physician:  Joy Martinez MD    SUMMARY    LEFT VENTRICLE:  The ventricle was dilated at 6 6 cm  Systolic function was mildly reduced  Ejection fraction was estimated to be 45 %  There was mild diffuse hypokinesis  Cannot rule out specific regional variations due to poor endocardial visualization  Wall thickness was mildly increased  AORTIC VALVE:  There was mild regurgitation  AORTA:  The root exhibited dilatation up to 4 4 cm  HISTORY: PRIOR HISTORY: Afib w/ RVR; chest pain, hypertension, heart failure, malignant neoplasm of skin    PROCEDURE: The procedure was performed at the bedside  This was a routine study  The transthoracic approach was used  The study included complete 2D imaging, M-mode, complete spectral Doppler, and color Doppler  The heart rate was 101 bpm,  at the start of the study  Images were obtained from the parasternal, apical, subcostal, and suprasternal notch acoustic windows  Image quality was adequate  LEFT VENTRICLE: The ventricle was dilated at 6 6 cm  Systolic function was mildly reduced  Ejection fraction was estimated to be 45 %  There was mild diffuse hypokinesis  Cannot rule out specific regional variations due to poor endocardial  visualization   Wall thickness was mildly increased  DOPPLER: Normal sinus rhythm was absent  RIGHT VENTRICLE: The size was normal  Systolic function was normal     LEFT ATRIUM: The atrium was dilated  RIGHT ATRIUM: The atrium was dilated  MITRAL VALVE: Valve structure was normal  There was normal leaflet separation  DOPPLER: There was no evidence for stenosis  There was trace regurgitation  AORTIC VALVE: The valve was probably trileaflet  Leaflets exhibited mildly increased thickness and normal cuspal separation  The valve was not well visualized  DOPPLER: There was no evidence for stenosis  There was mild regurgitation  TRICUSPID VALVE: The valve structure was normal  There was normal leaflet separation  DOPPLER: There was no evidence for stenosis  There was trace regurgitation  PULMONIC VALVE: Leaflets exhibited normal thickness, no calcification, and normal cuspal separation  DOPPLER: The transpulmonic velocity was within the normal range  There was trace regurgitation  PERICARDIUM: There was no pericardial effusion  A pericardial fat pad was present  The pericardium was normal in appearance  AORTA: The root exhibited dilatation up to 4 4 cm      SYSTEM MEASUREMENT TABLES    2D  %FS: 26 27 %  AV Diam: 4 46 cm  EDV(Teich): 206 85 ml  EF Biplane: 35 28 %  EF(Teich): 50 47 %  ESV(Teich): 102 45 ml  IVSd: 1 07 cm  LA Area: 20 26 cm2  LA Diam: 5 cm  LVEDV MOD A2C: 158 79 ml  LVEDV MOD A4C: 81 48 ml  LVEDV MOD BP: 117 6 ml  LVEF MOD A2C: 32 28 %  LVEF MOD A4C: 43 62 %  LVESV MOD A2C: 107 53 ml  LVESV MOD A4C: 45 94 ml  LVESV MOD BP: 76 11 ml  LVIDd: 6 38 cm  LVIDs: 4 7 cm  LVLd A2C: 9 92 cm  LVLd A4C: 9 2 cm  LVLs A2C: 9 5 cm  LVLs A4C: 8 05 cm  LVPWd: 1 03 cm  RA Area: 23 33 cm2  SV MOD A2C: 51 26 ml  SV MOD A4C: 35 54 ml  SV(Teich): 104 4 ml    MM  TAPSE: 1 82 cm    [employer] Accredited [facility]chocardi[facility]    Prepared and electronically signed by    Ginny Woodward MD  Signed 13-Feb-2018 14:08:06         No results found for this or any previous visit  Results for orders placed in visit on 06/09/15   NM myocardial perfusion spect (stress and/or rest)    Narrative PHARMACOLOGIC STRESS TEST, LEXISCAN          INDICATION-  Primary cardiomyopathy  History of cellulitis and lower   extremity edema  Provided ICD-9 Code- 425 4    COMPARISON- No prior studies,   TECHNIQUE-  Pharmacologic stress testing was performed utilizing   20x200  SPECT myocardial perfusion images was performed  Dosages of   TC-99-mm Myoview were 10 7 mCi and 44 8 mCi IV  An initial dose of   9 4 mCi was attempted, however there was dose infiltration which   resulted in diminished activity on resting images  Therefore, an   additional 10 7 mCi was given at rest, and the stress dose increased to   44 8 mCi  Time to peak imaging for rest 45 minutes and stress 34   minutes  Both rest and stress images were performed  Images were then   reconstructed in the short, vertical and horizontal long axes   projections  Baseline heart rate 93 beats per minute  Peak heart of 100 beats per minute  Baseline blood pressure 125/71 mmHg  Peak blood pressure 128/70 mmHg  Symptoms-   None   Please see cardiology report of physiologic data  FINDINGS-   OVERALL QUALITY-   Acceptable  ARTIFACT-  See below   PERFUSION IMAGES-   Although there is apparent diminished activity in   the inferior wall, this is felt to be related to diaphragm attenuation   artifact  CT attenuation correction protocol could not be utilized  There is no definite ischemia or fixed perfusion deficit   WALL MOTION-  Severe global hypokinesis  Further supporting lack of a   focal defect of the inferior wall, although hypokinetic, there is   inferior wall contractility similar to the remainder of the myocardium   EJECTION FRACTION-  21 %   IMPRESSION-   1  There is no definite myocardial ischemia or fixed perfusion deficit   2   Left ventricular ejection fraction- 21%, with severe global   hypokinesis   Transcribed on- [id number]           - GEETA Ray MD        Reading Radiologist- GEETA Ray MD        Electronically Signed- GEETA Ray MD        Released Date Time- 06/16/15 1211      ------------------------------------------------------------------------------   AMRIK RAMOS

## 2018-02-15 NOTE — ASSESSMENT & PLAN NOTE
· Nephrology following  · Suspect hypervolemic hyponatremia  · Continue fluid restriction 1 2 L per day  · Diuretics main on hold  · Monitor BMP

## 2018-02-15 NOTE — PLAN OF CARE
CARDIOVASCULAR - ADULT     Maintains optimal cardiac output and hemodynamic stability Progressing     Absence of cardiac dysrhythmias or at baseline rhythm Progressing        DISCHARGE PLANNING - CARE MANAGEMENT     Discharge to post-acute care or home with appropriate resources Progressing        Nutrition/Hydration-ADULT     Nutrient/Hydration intake appropriate for improving, restoring or maintaining nutritional needs Progressing        Potential for Falls     Patient will remain free of falls Progressing        Prexisting or High Potential for Compromised Skin Integrity     Skin integrity is maintained or improved Progressing        SKIN/TISSUE INTEGRITY - ADULT     Skin integrity remains intact Progressing     Incision(s), wounds(s) or drain site(s) healing without S/S of infection Progressing

## 2018-02-16 LAB
ANION GAP SERPL CALCULATED.3IONS-SCNC: 7 MMOL/L (ref 4–13)
BASOPHILS # BLD AUTO: 0.01 THOUSANDS/ΜL (ref 0–0.1)
BASOPHILS NFR BLD AUTO: 0 % (ref 0–1)
BUN SERPL-MCNC: 48 MG/DL (ref 5–25)
CALCIUM SERPL-MCNC: 8.4 MG/DL (ref 8.3–10.1)
CHLORIDE SERPL-SCNC: 105 MMOL/L (ref 100–108)
CO2 SERPL-SCNC: 28 MMOL/L (ref 21–32)
CREAT SERPL-MCNC: 1.45 MG/DL (ref 0.6–1.3)
EOSINOPHIL # BLD AUTO: 0.65 THOUSAND/ΜL (ref 0–0.61)
EOSINOPHIL NFR BLD AUTO: 10 % (ref 0–6)
ERYTHROCYTE [DISTWIDTH] IN BLOOD BY AUTOMATED COUNT: 13.9 % (ref 11.6–15.1)
GFR SERPL CREATININE-BSD FRML MDRD: 45 ML/MIN/1.73SQ M
GLUCOSE SERPL-MCNC: 84 MG/DL (ref 65–140)
HCT VFR BLD AUTO: 33.4 % (ref 36.5–49.3)
HCT VFR BLD AUTO: 34.5 % (ref 36.5–49.3)
HGB BLD-MCNC: 10.9 G/DL (ref 12–17)
HGB BLD-MCNC: 11.2 G/DL (ref 12–17)
LYMPHOCYTES # BLD AUTO: 1.25 THOUSANDS/ΜL (ref 0.6–4.47)
LYMPHOCYTES NFR BLD AUTO: 18 % (ref 14–44)
MAGNESIUM SERPL-MCNC: 2.2 MG/DL (ref 1.6–2.6)
MCH RBC QN AUTO: 32.1 PG (ref 26.8–34.3)
MCHC RBC AUTO-ENTMCNC: 32.6 G/DL (ref 31.4–37.4)
MCV RBC AUTO: 98 FL (ref 82–98)
MONOCYTES # BLD AUTO: 0.72 THOUSAND/ΜL (ref 0.17–1.22)
MONOCYTES NFR BLD AUTO: 11 % (ref 4–12)
NEUTROPHILS # BLD AUTO: 4.16 THOUSANDS/ΜL (ref 1.85–7.62)
NEUTS SEG NFR BLD AUTO: 61 % (ref 43–75)
PLATELET # BLD AUTO: 219 THOUSANDS/UL (ref 149–390)
PMV BLD AUTO: 10.2 FL (ref 8.9–12.7)
POTASSIUM SERPL-SCNC: 4.3 MMOL/L (ref 3.5–5.3)
RBC # BLD AUTO: 3.4 MILLION/UL (ref 3.88–5.62)
SODIUM SERPL-SCNC: 140 MMOL/L (ref 136–145)
WBC # BLD AUTO: 6.79 THOUSAND/UL (ref 4.31–10.16)

## 2018-02-16 PROCEDURE — 80048 BASIC METABOLIC PNL TOTAL CA: CPT | Performed by: INTERNAL MEDICINE

## 2018-02-16 PROCEDURE — 99232 SBSQ HOSP IP/OBS MODERATE 35: CPT | Performed by: PHYSICIAN ASSISTANT

## 2018-02-16 PROCEDURE — 85014 HEMATOCRIT: CPT | Performed by: PHYSICIAN ASSISTANT

## 2018-02-16 PROCEDURE — 97535 SELF CARE MNGMENT TRAINING: CPT

## 2018-02-16 PROCEDURE — C9113 INJ PANTOPRAZOLE SODIUM, VIA: HCPCS | Performed by: PHYSICIAN ASSISTANT

## 2018-02-16 PROCEDURE — 97530 THERAPEUTIC ACTIVITIES: CPT | Performed by: PHYSICAL THERAPIST

## 2018-02-16 PROCEDURE — 99232 SBSQ HOSP IP/OBS MODERATE 35: CPT | Performed by: INTERNAL MEDICINE

## 2018-02-16 PROCEDURE — 85018 HEMOGLOBIN: CPT | Performed by: PHYSICIAN ASSISTANT

## 2018-02-16 PROCEDURE — 85025 COMPLETE CBC W/AUTO DIFF WBC: CPT | Performed by: NURSE PRACTITIONER

## 2018-02-16 PROCEDURE — 83735 ASSAY OF MAGNESIUM: CPT | Performed by: INTERNAL MEDICINE

## 2018-02-16 PROCEDURE — 99232 SBSQ HOSP IP/OBS MODERATE 35: CPT | Performed by: NURSE PRACTITIONER

## 2018-02-16 PROCEDURE — 97116 GAIT TRAINING THERAPY: CPT | Performed by: PHYSICAL THERAPIST

## 2018-02-16 RX ADMIN — OXYBUTYNIN CHLORIDE 5 MG: 5 TABLET ORAL at 08:38

## 2018-02-16 RX ADMIN — OXYBUTYNIN CHLORIDE 5 MG: 5 TABLET ORAL at 18:13

## 2018-02-16 RX ADMIN — METOPROLOL TARTRATE 100 MG: 100 TABLET ORAL at 08:39

## 2018-02-16 RX ADMIN — NYSTATIN: 100000 POWDER TOPICAL at 14:55

## 2018-02-16 RX ADMIN — SODIUM CHLORIDE 8 MG/HR: 9 INJECTION, SOLUTION INTRAVENOUS at 14:54

## 2018-02-16 RX ADMIN — LORATADINE 10 MG: 10 TABLET ORAL at 08:39

## 2018-02-16 RX ADMIN — SUCRALFATE 1000 MG: 1 SUSPENSION ORAL at 13:31

## 2018-02-16 RX ADMIN — CLINDAMYCIN HYDROCHLORIDE 300 MG: 150 CAPSULE ORAL at 21:34

## 2018-02-16 RX ADMIN — ASPIRIN 81 MG: 81 TABLET, COATED ORAL at 08:39

## 2018-02-16 RX ADMIN — APIXABAN 2.5 MG: 2.5 TABLET, FILM COATED ORAL at 08:39

## 2018-02-16 RX ADMIN — SUCRALFATE 1000 MG: 1 SUSPENSION ORAL at 18:13

## 2018-02-16 RX ADMIN — SODIUM CHLORIDE 8 MG/HR: 9 INJECTION, SOLUTION INTRAVENOUS at 04:42

## 2018-02-16 RX ADMIN — CITALOPRAM HYDROBROMIDE 10 MG: 20 TABLET ORAL at 08:38

## 2018-02-16 RX ADMIN — CLINDAMYCIN HYDROCHLORIDE 300 MG: 150 CAPSULE ORAL at 08:39

## 2018-02-16 RX ADMIN — SUCRALFATE 1000 MG: 1 SUSPENSION ORAL at 04:42

## 2018-02-16 RX ADMIN — METOPROLOL TARTRATE 100 MG: 100 TABLET ORAL at 21:32

## 2018-02-16 RX ADMIN — SENNOSIDES 8.6 MG: 8.6 TABLET, FILM COATED ORAL at 08:39

## 2018-02-16 RX ADMIN — TORSEMIDE 10 MG: 10 TABLET ORAL at 08:39

## 2018-02-16 RX ADMIN — NYSTATIN: 100000 POWDER TOPICAL at 08:40

## 2018-02-16 RX ADMIN — NYSTATIN: 100000 POWDER TOPICAL at 21:34

## 2018-02-16 RX ADMIN — COLLAGENASE SANTYL: 250 OINTMENT TOPICAL at 08:40

## 2018-02-16 NOTE — PLAN OF CARE
Problem: Nutrition/Hydration-ADULT  Goal: Nutrient/Hydration intake appropriate for improving, restoring or maintaining nutritional needs  Monitor and assess patient's nutrition/hydration status for malnutrition (ex- brittle hair, bruises, dry skin, pale skin and conjunctiva, muscle wasting, smooth red tongue, and disorientation)  Collaborate with interdisciplinary team and initiate plan and interventions as ordered  Monitor patient's weight and dietary intake as ordered or per policy  Utilize nutrition screening tool and intervene per policy  Determine patient's food preferences and provide high-protein, high-caloric foods as appropriate       INTERVENTIONS:  - Monitor oral intake, urinary output, labs, and treatment plans  - Assess nutrition and hydration status and recommend course of action  - Evaluate amount of meals eaten  - Assist patient with eating if necessary   - Allow adequate time for meals  - Recommend/ encourage appropriate diets, oral nutritional supplements, and vitamin/mineral supplements  - Order, calculate, and assess calorie counts as needed  - Recommend, monitor, and adjust tube feedings and TPN/PPN based on assessed needs  - Assess need for intravenous fluids  - Provide specific nutrition/hydration education as appropriate  - Include patient/family/caregiver in decisions related to nutrition   Outcome: Progressing  Diet now advanced to full liquid, reviewed menu options with patient and daughter

## 2018-02-16 NOTE — ASSESSMENT & PLAN NOTE
· Patient with difficulty swallowing, noted to be choking on his lunch on 2/14/18  · GI consulted  · EGD: impacted food material in distal esophagus which was gently pushed down with scope  Erythema and ulceration noted underneath  No strictures  Fresh blood clots in distal bulb  Diffuse ulceration noted in the bulb extending down the duodenal sweep with active bleeding  Bleeding controlled with epinephrine injection and cauterization with gold probe  · Clear liquid diet  · Continue Protonix gtt and Carafate  · Hg 10 9 today from 12 1 yesterday  · Repeat H&H this evening  · Hold Eliquis

## 2018-02-16 NOTE — PROGRESS NOTES
Progress Note - Kevon Scott 80 y o  male MRN: [id number]    Unit/Bed#: -01 Encounter: [id number]        Subjective:   Patient reports no bowel movements since yesterday's procedure, reports he is feeling okay  Tolerating liquids, no nausea or vomiting, denies abdominal pain  Objective:     Vitals: Blood pressure 110/75, pulse 71, temperature 98 4 °F (36 9 °C), temperature source Oral, resp  rate 19, height 5' 10" (1 778 m), weight 104 kg (229 lb 4 5 oz), SpO2 92 %  ,Body mass index is 32 9 kg/m²  Intake/Output Summary (Last 24 hours) at 02/16/18 1436  Last data filed at 02/15/18 2325   Gross per 24 hour   Intake              990 ml   Output             1040 ml   Net              -50 ml       Physical Exam:   General appearance: alert, appears stated age and cooperative  Lungs: clear to auscultation bilaterally, no labored breathing/accessory muscle use  Heart: regular rate and rhythm, S1, S2 normal, no murmur, click, rub or gallop  Abdomen: soft, obese, non-tender; bowel sounds normal; no masses,  no organomegaly  Extremities: no edema    Invasive Devices     Peripheral Intravenous Line            Peripheral IV 02/11/18 Left Antecubital 4 days                Lab, Imaging and other studies: I have personally reviewed pertinent reports      Admission on 02/11/2018   Component Date Value    WBC 02/11/2018 10 13     RBC 02/11/2018 5 10     Hemoglobin 02/11/2018 15 7     Hematocrit 02/11/2018 48 3     MCV 02/11/2018 95     MCH 02/11/2018 30 8     MCHC 02/11/2018 32 5     RDW 02/11/2018 14 0     MPV 02/11/2018 10 4     Platelets 87/90/6301 292     Neutrophils Relative 02/11/2018 81*    Lymphocytes Relative 02/11/2018 13*    Monocytes Relative 02/11/2018 6     Eosinophils Relative 02/11/2018 0     Basophils Relative 02/11/2018 0     Neutrophils Absolute 02/11/2018 8 17*    Lymphocytes Absolute 02/11/2018 1 34     Monocytes Absolute 02/11/2018 0 57     Eosinophils Absolute 02/11/2018 0 03  Basophils Absolute 02/11/2018 0 02     Sodium 02/11/2018 136     Potassium 02/11/2018 4 5     Chloride 02/11/2018 94*    CO2 02/11/2018 29     Anion Gap 02/11/2018 13     BUN 02/11/2018 39*    Creatinine 02/11/2018 3 78*    Glucose 02/11/2018 111     Calcium 02/11/2018 9 3     AST 02/11/2018 39     ALT 02/11/2018 34     Alkaline Phosphatase 02/11/2018 46     Total Protein 02/11/2018 7 2     Albumin 02/11/2018 3 6     Total Bilirubin 02/11/2018 1 20*    eGFR 02/11/2018 14     Troponin I 02/11/2018 0 05*    NT-proBNP 02/11/2018 760*    Protime 02/11/2018 16 4*    INR 02/11/2018 1 28*    PTT 02/11/2018 34     Troponin I 02/11/2018 0 06*    Digoxin Lvl 02/11/2018 1 1     Troponin I 02/12/2018 0 06*    Sodium 02/12/2018 135*    Potassium 02/12/2018 5 0     Chloride 02/12/2018 95*    CO2 02/12/2018 25     Anion Gap 02/12/2018 15*    BUN 02/12/2018 51*    Creatinine 02/12/2018 4 58*    Glucose 02/12/2018 119     Calcium 02/12/2018 8 4     eGFR 02/12/2018 11     WBC 02/12/2018 8 61     RBC 02/12/2018 4 69     Hemoglobin 02/12/2018 14 6     Hematocrit 02/12/2018 44 6     MCV 02/12/2018 95     MCH 02/12/2018 31 1     MCHC 02/12/2018 32 7     RDW 02/12/2018 14 0     Platelets 25/89/3644 285     MPV 02/12/2018 10 8     Magnesium 02/12/2018 1 9     Protime 02/12/2018 18 4*    INR 02/12/2018 1 48*    PTT 02/12/2018 35     Ventricular Rate 02/11/2018 111     Atrial Rate 02/11/2018 192     QRSD Interval 02/11/2018 90     QT Interval 02/11/2018 292     QTC Interval 02/11/2018 397     P Axis 02/11/2018 171     QRS [place] 02/11/2018 -25     T Wave [place] 02/11/2018 148     Ventricular Rate 02/11/2018 140     Atrial Rate 02/11/2018 326     QRSD Interval 02/11/2018 88     QT Interval 02/11/2018 282     QTC Interval 02/11/2018 430     QRS [place] 02/11/2018 -23     T Wave [place] 02/11/2018 135     Clarity, UA 02/13/2018 Slightly Cloudy     Color, UA 02/13/2018 Yellow     Specific Gravity, UA 02/13/2018 >=1 030     pH, UA 02/13/2018 5 0     Glucose, UA 02/13/2018 Negative     Ketones, UA 02/13/2018 Negative     Blood, UA 02/13/2018 Large*    Protein, UA 02/13/2018 Trace*    Nitrite, UA 02/13/2018 Negative     Bilirubin, UA 02/13/2018 Negative     Urobilinogen, UA 02/13/2018 0 2     Leukocytes, UA 02/13/2018 Negative     WBC, UA 02/13/2018 0-5     RBC, UA 02/13/2018 4-10*    Bacteria, UA 02/13/2018 Occasional     Epithelial Cells 02/13/2018 Occasional     Eosinophil, Urine 02/13/2018 0     TSH 3RD GENERATON 02/13/2018 0 651     Sodium 02/13/2018 132*    Potassium 02/13/2018 5 0     Chloride 02/13/2018 96*    CO2 02/13/2018 25     Anion Gap 02/13/2018 11     BUN 02/13/2018 82*    Creatinine 02/13/2018 4 06*    Glucose 02/13/2018 121     Calcium 02/13/2018 8 2*    AST 02/13/2018 88*    ALT 02/13/2018 42     Alkaline Phosphatase 02/13/2018 31*    Total Protein 02/13/2018 6 0*    Albumin 02/13/2018 2 9*    Total Bilirubin 02/13/2018 1 30*    eGFR 02/13/2018 13     Phosphorus 02/13/2018 4 4*    Magnesium 02/13/2018 1 9     Osmolality, Ur 02/13/2018 490     Sodium, Ur 02/13/2018 21     Chloride, Ur 02/13/2018 <10*    Sodium 02/14/2018 136     Potassium 02/14/2018 4 5     Chloride 02/14/2018 100     CO2 02/14/2018 27     Anion Gap 02/14/2018 9     BUN 02/14/2018 72*    Creatinine 02/14/2018 2 24*    Glucose 02/14/2018 94     Calcium 02/14/2018 8 5     eGFR 02/14/2018 26     WBC 02/14/2018 6 24     RBC 02/14/2018 3 85*    Hemoglobin 02/14/2018 12 1     Hematocrit 02/14/2018 36 4*    MCV 02/14/2018 95     MCH 02/14/2018 31 4     MCHC 02/14/2018 33 2     RDW 02/14/2018 13 3     MPV 02/14/2018 9 8     Platelets 19/96/4296 201     Neutrophils Relative 02/14/2018 73     Lymphocytes Relative 02/14/2018 14     Monocytes Relative 02/14/2018 5     Eosinophils Relative 02/14/2018 8*    Basophils Relative 02/14/2018 0     Neutrophils Absolute 02/14/2018 4 62     Lymphocytes Absolute 02/14/2018 0 84     Monocytes Absolute 02/14/2018 0 30     Eosinophils Absolute 02/14/2018 0 47     Basophils Absolute 02/14/2018 0 01     Bilirubin, Direct 02/14/2018 0 32*    Osmolality Serum 02/14/2018 303*    Sodium 02/15/2018 139     Potassium 02/15/2018 4 1     Chloride 02/15/2018 105     CO2 02/15/2018 27     Anion Gap 02/15/2018 7     BUN 02/15/2018 60*    Creatinine 02/15/2018 1 50*    Glucose 02/15/2018 97     Calcium 02/15/2018 8 8     eGFR 02/15/2018 43     Sodium 02/16/2018 140     Potassium 02/16/2018 4 3     Chloride 02/16/2018 105     CO2 02/16/2018 28     Anion Gap 02/16/2018 7     BUN 02/16/2018 48*    Creatinine 02/16/2018 1 45*    Glucose 02/16/2018 84     Calcium 02/16/2018 8 4     eGFR 02/16/2018 45     Magnesium 02/16/2018 2 2     WBC 02/16/2018 6 79     RBC 02/16/2018 3 40*    Hemoglobin 02/16/2018 10 9*    Hematocrit 02/16/2018 33 4*    MCV 02/16/2018 98     MCH 02/16/2018 32 1     MCHC 02/16/2018 32 6     RDW 02/16/2018 13 9     MPV 02/16/2018 10 2     Platelets 80/67/1561 219     Neutrophils Relative 02/16/2018 61     Lymphocytes Relative 02/16/2018 18     Monocytes Relative 02/16/2018 11     Eosinophils Relative 02/16/2018 10*    Basophils Relative 02/16/2018 0     Neutrophils Absolute 02/16/2018 4 16     Lymphocytes Absolute 02/16/2018 1 25     Monocytes Absolute 02/16/2018 0 72     Eosinophils Absolute 02/16/2018 0 65*    Basophils Absolute 02/16/2018 0 01          EGD  FINDINGS:   Esophagus: Impacted food material in distal esophagus was   genly pushed down with scope and noted erythema and ulceration underneath,   more at the GEJ  No strictures  Stomach: The stomach appeared to be   normal   Duodenum: Fresh blood clots in distal bulb and D2 washed off and   suctioned  Diffuse ulceration noted in the bulb extending down the   duodenal sweep with active bleeding   Attempted placing hemoclips but the bleeding continued and was controlled with epinephrine injection and   cauterization with gold probe  COMPLICATIONS: There were no complications  IMPRESSIONS: Impacted food material in distal esophagus was genly pushed   down with scope and noted erythema and ulceration underneath, more at the   GEJ  No strictures  Normal stomach  Fresh blood clots in distal bulb and   D2 washed off and suctioned  Diffuse ulceration noted in the bulb   extending down the duodenal sweep with active bleeding  Attempted placing   hemoclips but the bleeding continued and was controlled with epinephrine   injection and cauterization with gold probe  RECOMMENDATIONS: IV protonix drip and Carafate; monitor h/h  Assessment/Plan:    1  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, status post EGD with disimpaction yesterday  - outpatient follow-up, can consider repeating EGD depending on patient's symptomatology  - advised patient to chew food thoroughly, take small bites, pace himself with eating  - continue PPI      2  Duodenal ulcerations with bleeding, status post hemostasis with epinephrine injection and gold probe cauterization during EGD yesterday    No evidence of active bleeding at this time    - continue Protonix drip for now  - may advance to full liquid diet, check on patient tomorrow and consider advancing to soft nonulcerogenic diet if continuing to do well  - continue Carafate

## 2018-02-16 NOTE — PLAN OF CARE
Problem: Potential for Falls  Goal: Patient will remain free of falls  INTERVENTIONS:  - Assess patient frequently for physical needs  -  Identify cognitive and physical deficits and behaviors that affect risk of falls  -  [place] fall precautions as indicated by assessment   - Educate patient/family on patient safety including physical limitations  - Instruct patient to call for assistance with activity based on assessment  - Modify environment to reduce risk of injury  - Consider OT/PT consult to assist with strengthening/mobility   Outcome: Progressing      Problem: CARDIOVASCULAR - ADULT  Goal: Maintains optimal cardiac output and hemodynamic stability  INTERVENTIONS:  - Monitor I/O, vital signs and rhythm  - Monitor for S/S and trends of decreased cardiac output i e  bleeding, hypotension  - Administer and titrate ordered vasoactive medications to optimize hemodynamic stability  - Assess quality of pulses, skin color and temperature  - Assess for signs of decreased coronary artery perfusion - ex   Angina  - Instruct patient to report change in severity of symptoms   Outcome: Progressing    Goal: Absence of cardiac dysrhythmias or at baseline rhythm  INTERVENTIONS:  - Continuous cardiac monitoring, monitor vital signs, obtain 12 lead EKG if indicated  - Administer antiarrhythmic and heart rate control medications as ordered  - Monitor electrolytes and administer replacement therapy as ordered   Outcome: Progressing      Problem: SKIN/TISSUE INTEGRITY - ADULT  Goal: Skin integrity remains intact  INTERVENTIONS  - Identify patients at risk for skin breakdown  - Assess and monitor skin integrity  - Assess and monitor nutrition and hydration status  - Monitor labs (i e  albumin)  - Assess for incontinence   - Turn and reposition patient  - Assist with mobility/ambulation  - Relieve pressure over bony prominences  - Avoid friction and shearing  - Provide appropriate hygiene as needed including keeping skin clean and dry  - Evaluate need for skin moisturizer/barrier cream  - Collaborate with interdisciplinary team (i e  Nutrition, Rehabilitation, etc )   - Patient/family teaching   Outcome: Progressing    Goal: Incision(s), wounds(s) or drain site(s) healing without S/S of infection  INTERVENTIONS  - Assess and document risk factors for skin impairment   - Assess and document dressing, incision, wound bed, drain sites and surrounding tissue  - Initiate Nutrition services consult and/or wound management as needed   Outcome: Progressing      Problem: Prexisting or High Potential for Compromised Skin Integrity  Goal: Skin integrity is maintained or improved  INTERVENTIONS:  - Identify patients at risk for skin breakdown  - Assess and monitor skin integrity  - Assess and monitor nutrition and hydration status  - Monitor labs (i e  albumin)  - Assess for incontinence   - Turn and reposition patient  - Assist with mobility/ambulation  - Relieve pressure over bony prominences  - Avoid friction and shearing  - Provide appropriate hygiene as needed including keeping skin clean and dry  - Evaluate need for skin moisturizer/barrier cream  - Collaborate with interdisciplinary team (i e  Nutrition, Rehabilitation, etc )   - Patient/family teaching   Outcome: Progressing      Problem: DISCHARGE PLANNING - CARE MANAGEMENT  Goal: Discharge to post-acute care or home with appropriate resources  INTERVENTIONS:  - Conduct assessment to determine patient/family and health care team treatment goals, and need for post-acute services based on payer coverage, community resources, and patient preferences, and barriers to discharge  - Address psychosocial, clinical, and financial barriers to discharge as identified in assessment in conjunction with the patient/family and health care team  - Arrange appropriate level of post-acute services according to patient's   needs and preference and payer coverage in collaboration with the physician and health care team  - Communicate with and update the patient/family, physician, and health care team regarding progress on the discharge plan  - Arrange appropriate transportation to post-acute venues   Outcome: Progressing      Problem: Nutrition/Hydration-ADULT  Goal: Nutrient/Hydration intake appropriate for improving, restoring or maintaining nutritional needs  Monitor and assess patient's nutrition/hydration status for malnutrition (ex- brittle hair, bruises, dry skin, pale skin and conjunctiva, muscle wasting, smooth red tongue, and disorientation)  Collaborate with interdisciplinary team and initiate plan and interventions as ordered  Monitor patient's weight and dietary intake as ordered or per policy  Utilize nutrition screening tool and intervene per policy  Determine patient's food preferences and provide high-protein, high-caloric foods as appropriate       INTERVENTIONS:  - Monitor oral intake, urinary output, labs, and treatment plans  - Assess nutrition and hydration status and recommend course of action  - Evaluate amount of meals eaten  - Assist patient with eating if necessary   - Allow adequate time for meals  - Recommend/ encourage appropriate diets, oral nutritional supplements, and vitamin/mineral supplements  - Order, calculate, and assess calorie counts as needed  - Recommend, monitor, and adjust tube feedings and TPN/PPN based on assessed needs  - Assess need for intravenous fluids  - Provide specific nutrition/hydration education as appropriate  - Include patient/family/caregiver in decisions related to nutrition   Outcome: Progressing

## 2018-02-16 NOTE — PROGRESS NOTES
Progress Note - Cardiology   Paolo Gonzalez 80 y o  male MRN: [id number]  Encounter: [id number]  02/16/18  11:44 AM        Assessment/Plan:    1  Chronic systolic HF/NICM  Repeat echo does not show significant change in LV function, EF previously 50%, now currently estimated at 45%  Continue beta blocker as tolerated  Not on Lisinopril currently due to renal dysfunction  Was on 2 5 mg daily prior to admission  Home diuretic also held (at home was on lasix 40 bid)  Started on Torsemide 10 2/16, assess urinary response, likely will need higher dose in long term  2  Permanent afib  HR mostly controlled with high dose Metoprolol  Eliquis reduced to 2 5 bid due to renal dysfunction  Digoxin stopped due to renal dysfunction and concern for risk of digoxin toxicity  If creatinine normalizes, could resume digoxin, as BP likely will not tolerate addition of Diltiazem for rate control, but will defer this to outpt setting  3  HTN  BP controlled on Metoprolol  May be able to resume Lisinopril 2 5 as outpt if renal function stable  4  JONH/CKD III  Creatinine improving dramatically over last 3 days, now closer to his previous baseline of 1 18 in 4/16 (today 1 45, down from peak of 4 58)  May have had AIN given rash on presentation  Was on Lasix 40 bid prior to admission, has been started on Torsemide 10 by renal, likely this will need to be titrated up to at least Torsemide 40 daily in long term given that he was on Lasix 40 bid previously  Follows with Dr Jean Spivey as outpt, follow up scheduled after discharge  Subjective/Objective   Chief Complaint:   Chief Complaint   Patient presents with    Rash     pts daughter reports pt was vomiting today and "itchy all over " pt has generalized rash  states c/p last night  pt reports generalized body aches  denies any fevers            Subjective: 80year old man with a history of NICM with EF 43%, chronic systolic HF, HTN, permanent afib on rate control, admitted due to chest discomfort and body rash, found to have worsening renal function with creatinine 3 78  Also found to be somewhat tachycardic  Echo 2/18 shows mildly reduced LV function, approximately 45%  His home Lasix, digoxin, and Lisinopril have been held  Creatinine remains elevated from his prior baseline in 2016, but trending down significantly compared to admission  He denies any current chest pain, shortness of breath, daughter reports he is starting to get more LE edema, left greater than right  No dizziness or lightheadedness  No fevers or chills  No cough  HR still at times mildly tachycardic  He had EGD 2/15 which showed food impaction, as well as duodenal ulcers with fresh blood  Still NPO  On low dose IVF as well as IV protonix        Patient Active Problem List   Diagnosis    Hypertension    Atrial fibrillation with RVR ([place] [address] )    Chronic systolic CHF (congestive heart failure) ([place] [address] )    Acute kidney injury ([place] [address] )    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    Depression    Staph aureus infection of the left lower extremity    Stage 3 chronic kidney disease    Azotemia    Dysphagia with suspected food impaction    Food impaction of esophagus     Past Medical History:   Diagnosis Date    Atrial fibrillation ([place] [address] )     Hypertension     Macular degeneration        No Known Allergies    Current Facility-Administered Medications   Medication Dose Route Frequency Provider Last Rate Last Dose    acetaminophen (TYLENOL) tablet 650 mg  650 mg Oral Q4H PRN Sandra Hoyt PA-C   650 mg at 02/13/18 2120    albuterol (PROVENTIL HFA,VENTOLIN HFA) inhaler 2 puff  2 puff Inhalation Q4H PRN Sandra Hoyt PA-C        aspirin (ECOTRIN LOW STRENGTH) EC tablet 81 mg  81 mg Oral Daily Sandra Hoyt PA-C   81 mg at 02/16/18 0839    calcium carbonate (TUMS) chewable tablet 1,000 mg  1,000 mg Oral Daily PRN Sandra Hoyt PA-C        citalopram (CeleXA) tablet 10 mg  10 mg Oral Daily Sandra Hoyt PA-C   10 mg at 02/16/18 1542    clindamycin (CLEOCIN) capsule 300 mg  300 mg Oral Q12H [address] Nathan Odom PA-C   300 mg at 02/16/18 9381    collagenase (SANTYL) ointment   Topical Daily Nathan Chapman PA-C        diphenhydrAMINE-zinc acetate (BENADRYL) 2-0 1 % cream   Topical TID PRN Jerson Samuel PA-C        loratadine (CLARITIN) tablet 10 mg  10 mg Oral Daily Sandra Hoyt PA-C   10 mg at 02/16/18 0839    metoprolol (LOPRESSOR) injection 5 mg  5 mg Intravenous Q6H PRN Jae Arias PA-C   5 mg at 02/15/18 1229    metoprolol tartrate (LOPRESSOR) tablet 100 mg  100 mg Oral Q12H [address] Petra Estrada MD   100 mg at 02/16/18 9160    nystatin (MYCOSTATIN) powder   Topical TID Jerson Samuel PA-C        [facility]ron [facility] injection 4 mg  4 mg Intravenous Q6H PRN Sandra Hoyt PA-C   4 mg at 02/12/18 2116    oxybutynin (DITROPAN) tablet 5 mg  5 mg Oral BID Sandra Hoyt PA-C   5 mg at 02/16/18 5566    pantoprazole (PROTONIX) 80 mg in sodium chloride 0 9 % 100 mL infusion  8 mg/hr Intravenous Continuous Chip Lin PA-C 10 mL/hr at 02/16/18 0442 8 mg/hr at 02/16/18 0442    senna (SENOKOT) tablet 8 6 mg  1 tablet Oral Daily Sandra Hoyt PA-C   8 6 mg at 02/16/18 0839    sucralfate (CARAFATE) oral suspension 1,000 mg  1,000 mg Oral Q6H [address] Chip Lin PA-C   1,000 mg at 02/16/18 0442    torsemide (DEMADEX) tablet 10 mg  10 mg Oral Daily Petra Estrada MD   10 mg at 02/16/18 0839       Vitals: /75 (BP Location: Left arm)   Pulse 71   Temp 98 4 °F (36 9 °C) (Oral)   Resp 19   Ht 5' 10" (1 778 m)   Wt 104 kg (229 lb 4 5 oz)   SpO2 92%   BMI 32 90 kg/m²     Intake/Output Summary (Last 24 hours) at 02/16/18 1144  Last data filed at 02/15/18 2325   Gross per 24 hour   Intake             1190 ml   Output             1240 ml   Net              -50 ml     Wt Readings from Last 3 Encounters:   02/16/18 104 kg (229 lb 4 5 oz) 06/14/17 105 kg (231 lb 8 oz)   12/09/16 98 9 kg (218 lb)       Body mass index is 32 9 kg/m²  ,     Vitals:    02/15/18 1610 02/15/18 2006 02/15/18 2247 02/16/18 0709   BP: 145/76 142/96 99/54 110/75   Pulse: 94  75 71   Patient Position - Orthostatic VS: Lying  Lying Sitting       Physical Exam:     GEN: Awake and alert, in no acute distress  HEENT: Sclera anicteric, conjunctivae pink, mucous membranes moist   NECK: Supple, no carotid bruits, no significant JVD  HEART: Irregularly irregular rhythm, HR mildly tachycardic, no murmurs, clicks, gallops or rubs  LUNGS: Clear to auscultation bilaterally; no wheezes, rales, or rhonchi   ABDOMEN: Obese, soft, nontender, nondistended, normoactive bowel sounds  EXTREMITIES: Skin warm and well perfused, no clubbing, cyanosis, or edema  NEURO: No focal findings  SKIN: Normal without suspicious lesions on exposed skin  Resolving rash        Lab Results:     BMP:    Results from last 7 days  Lab Units 02/16/18  0443 02/15/18  0603 02/14/18  0547 02/13/18  0530 02/12/18  0524 02/11/18  1806   SODIUM mmol/L 140 139 136 132* 135* 136   POTASSIUM mmol/L 4 3 4 1 4 5 5 0 5 0 4 5   CHLORIDE mmol/L 105 105 100 96* 95* 94*   CO2 mmol/L 28 27 27 25 25 29   BUN mg/dL 48* 60* 72* 82* 51* 39*   CREATININE mg/dL 1 45* 1 50* 2 24* 4 06* 4 58* 3 78*   GLUCOSE RANDOM mg/dL 84 97 94 121 119 111   CALCIUM mg/dL 8 4 8 8 8 5 8 2* 8 4 9 3       CBC:     Results from last 7 days  Lab Units 02/16/18  0443 02/14/18  0547 02/12/18  0524 02/11/18  1806   WBC Thousand/uL 6 79 6 24 8 61 10 13   HEMOGLOBIN g/dL 10 9* 12 1 14 6 15 7   HEMATOCRIT % 33 4* 36 4* 44 6 48 3   MCV fL 98 95 95 95   PLATELETS Thousands/uL 219 201 285 292   MCH pg 32 1 31 4 31 1 30 8   MCHC g/dL 32 6 33 2 32 7 32 5   RDW % 13 9 13 3 14 0 14 0   MPV fL 10 2 9 8 10 8 10 4         Results from last 7 days  Lab Units 02/12/18  0047 02/11/18  2109 02/11/18  1806   TROPONIN I ng/mL 0 06* 0 06* 0 05*         Results from last 7 days  Lab Units 18  1806   NT-PRO BNP pg/mL 760*             Results from last 7 days  Lab Units 18  0443 18  0530 18  0524   MAGNESIUM mg/dL 2 2 1 9 1 9       INR:     Results from last 7 days  Lab Units 18  0524 18  1806   INR  1 48* 1 28*       Lipid Profile:   No results found for: CHOL  No results found for: HDL  No results found for: LDLCALC  No results found for: TRIG      Hgb A1c:         Telemetry: personally reviewed, afib, HR in low 100s, occasional RVR to 150s  Cardiac testing:   Results for orders placed during the hospital encounter of 18   Echo complete with contrast if indicated    Narrative [place] [id number], [address] [facility]  [phone]    Transthoracic Echocardiogram  2D, M-mode, Doppler, and Color Doppler    Study date:  2018    Patient: Cl Olmedo  MR number: [id number]  Account number: [de-identified]  : 1935  Age: 80 years  Gender: Male  Status: Inpatient  Location: Bedside  Height: 70 in  Weight: 231 4 lb  BP: 111/ 51 mmHg    Indications: Afib    Diagnoses: I48 0 - Atrial fibrillation    Sonographer:  SEMAJ Garcia  Primary Physician:  Bretta Boxer, DO  Referring Physician:  Tali Moore PA-C  Group:  [facility]  Interpreting Physician:  Ed Lam MD    SUMMARY    LEFT VENTRICLE:  The ventricle was dilated at 6 6 cm  Systolic function was mildly reduced  Ejection fraction was estimated to be 45 %  There was mild diffuse hypokinesis  Cannot rule out specific regional variations due to poor endocardial visualization  Wall thickness was mildly increased  AORTIC VALVE:  There was mild regurgitation  AORTA:  The root exhibited dilatation up to 4 4 cm  HISTORY: PRIOR HISTORY: Afib w/ RVR; chest pain, hypertension, heart failure, malignant neoplasm of skin    PROCEDURE: The procedure was performed at the bedside  This was a routine study   The transthoracic approach was used  The study included complete 2D imaging, M-mode, complete spectral Doppler, and color Doppler  The heart rate was 101 bpm,  at the start of the study  Images were obtained from the parasternal, apical, subcostal, and suprasternal notch acoustic windows  Image quality was adequate  LEFT VENTRICLE: The ventricle was dilated at 6 6 cm  Systolic function was mildly reduced  Ejection fraction was estimated to be 45 %  There was mild diffuse hypokinesis  Cannot rule out specific regional variations due to poor endocardial  visualization  Wall thickness was mildly increased  DOPPLER: Normal sinus rhythm was absent  RIGHT VENTRICLE: The size was normal  Systolic function was normal     LEFT ATRIUM: The atrium was dilated  RIGHT ATRIUM: The atrium was dilated  MITRAL VALVE: Valve structure was normal  There was normal leaflet separation  DOPPLER: There was no evidence for stenosis  There was trace regurgitation  AORTIC VALVE: The valve was probably trileaflet  Leaflets exhibited mildly increased thickness and normal cuspal separation  The valve was not well visualized  DOPPLER: There was no evidence for stenosis  There was mild regurgitation  TRICUSPID VALVE: The valve structure was normal  There was normal leaflet separation  DOPPLER: There was no evidence for stenosis  There was trace regurgitation  PULMONIC VALVE: Leaflets exhibited normal thickness, no calcification, and normal cuspal separation  DOPPLER: The transpulmonic velocity was within the normal range  There was trace regurgitation  PERICARDIUM: There was no pericardial effusion  A pericardial fat pad was present  The pericardium was normal in appearance  AORTA: The root exhibited dilatation up to 4 4 cm      SYSTEM MEASUREMENT TABLES    2D  %FS: 26 27 %  AV Diam: 4 46 cm  EDV(Teich): 206 85 ml  EF Biplane: 35 28 %  EF(Teich): 50 47 %  ESV(Teich): 102 45 ml  IVSd: 1 07 cm  LA Area: 20 26 cm2  LA Diam: 5 cm  LVEDV MOD A2C: 158 79 ml  LVEDV MOD A4C: 81 48 ml  LVEDV MOD BP: 117 6 ml  LVEF MOD A2C: 32 28 %  LVEF MOD A4C: 43 62 %  LVESV MOD A2C: 107 53 ml  LVESV MOD A4C: 45 94 ml  LVESV MOD BP: 76 11 ml  LVIDd: 6 38 cm  LVIDs: 4 7 cm  LVLd A2C: 9 92 cm  LVLd A4C: 9 2 cm  LVLs A2C: 9 5 cm  LVLs A4C: 8 05 cm  LVPWd: 1 03 cm  RA Area: 23 33 cm2  SV MOD A2C: 51 26 ml  SV MOD A4C: 35 54 ml  SV(Teich): 104 4 ml    MM  TAPSE: 1 82 cm    [employer] Accredited Echocardiography Laboratory    Prepared and electronically signed by    Chayito Hays MD  Signed 13-Feb-2018 14:08:06         No results found for this or any previous visit  Results for orders placed in visit on 06/09/15   NM myocardial perfusion spect (stress and/or rest)    Narrative PHARMACOLOGIC STRESS TEST, LEXISCAN          INDICATION-  Primary cardiomyopathy  History of cellulitis and lower   extremity edema  Provided ICD-9 Code- 425 4    COMPARISON- No prior studies,   TECHNIQUE-  Pharmacologic stress testing was performed utilizing   [employer]  SPECT myocardial perfusion images was performed  Dosages of   TC-99-mm Myoview were 10 7 mCi and 44 8 mCi IV  An initial dose of   9 4 mCi was attempted, however there was dose infiltration which   resulted in diminished activity on resting images  Therefore, an   additional 10 7 mCi was given at rest, and the stress dose increased to   44 8 mCi  Time to peak imaging for rest 45 minutes and stress 34   minutes  Both rest and stress images were performed  Images were then   reconstructed in the short, vertical and horizontal long axes   projections  Baseline heart rate 93 beats per minute  Peak heart of 100 beats per minute  Baseline blood pressure 125/71 mmHg  Peak blood pressure 128/70 mmHg  Symptoms-   None   Please see cardiology report of physiologic data  FINDINGS-   OVERALL QUALITY-   Acceptable     ARTIFACT-  See below   PERFUSION IMAGES-   Although there is apparent diminished activity in   the inferior wall, this is felt to be related to diaphragm attenuation   artifact  CT attenuation correction protocol could not be utilized  There is no definite ischemia or fixed perfusion deficit   WALL MOTION-  Severe global hypokinesis  Further supporting lack of a   focal defect of the inferior wall, although hypokinetic, there is   inferior wall contractility similar to the remainder of the myocardium   EJECTION FRACTION-  21 %   IMPRESSION-   1  There is no definite myocardial ischemia or fixed perfusion deficit   2   Left ventricular ejection fraction- 21%, with severe global   hypokinesis   Transcribed on- [id number]           - GEETA Lazo MD        Reading Radiologist- GEETA Lazo MD        Electronically Signed- GEETA Lazo MD        Released Date Time- 06/16/15 1211      ------------------------------------------------------------------------------    [address] [place]

## 2018-02-16 NOTE — PLAN OF CARE
Problem: OCCUPATIONAL THERAPY ADULT  Goal: Performs self-care activities at highest level of function for planned discharge setting  See evaluation for individualized goals  Treatment Interventions: ADL retraining, Functional transfer training, Endurance training, Patient/family training, Equipment evaluation/education, Compensatory technique education, Activityengagement          See flowsheet documentation for full assessment, interventions and recommendations  Outcome: Progressing  Limitation: Decreased ADL status, Decreased endurance, Decreased self-care trans, Decreased high-level ADLs     Assessment: Pt seen for OT session this AM  Pt completed UBD w/ mod I after set- up, and feeding w/ mod I after set- up seated in chair at tray table for liquids  Pt benefits from cues for environment set- up due to decreased vision  Pt completed sit <> stand from chair w/ min A and benefits from cues / assist for controlled decent to chair  Continue to recommend that pt can return home to [facility] w/ family support / assist and home OT when medically stable for discharge from acute care   Will continue to follow     OT Discharge Recommendation: Home OT (return to [facility] w/ family support / assist and home OT)  OT - OK to Discharge:  (when medically stable)

## 2018-02-16 NOTE — OCCUPATIONAL THERAPY NOTE
[address] Progress Note     Patient Name: Shyann Trent  LUKCS'R Date: 2/16/2018  Problem List  Patient Active Problem List   Diagnosis    Hypertension    Atrial fibrillation with RVR (HCC)    Chronic systolic CHF (congestive heart failure) (HCC)    Acute kidney injury ([address] )    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    Depression    Staph aureus infection of the left lower extremity    Stage 3 chronic kidney disease    Azotemia    Dysphagia with suspected food impaction    Food impaction of esophagus           02/16/18 0959   Restrictions/Precautions   Other Precautions Visual impairment;Telemetry; Bed Alarm; Chair Alarm; Fall Risk   General   Response to Previous Treatment Patient with no complaints from previous session   Family/Caregiver Present Pt reports that his daugther in law will be in later   Pain Assessment   Pain Assessment No/denies pain   Pain Score No Pain   ADL   Eating Assistance 6  Modified independent   Eating Deficit Setup; Increased time to complete   Eating Comments seated in chair at tray table after set- up w/ liquids   UB Dressing Assistance 6  Modified independent   UB Dressing Deficit Setup;Verbal cueing   Bed Mobility   Supine to Sit Unable to assess   Sit to Supine Unable to assess   Additional Comments Pt seated OOB in chair upon therapist arrival and at end of session w/ call bell in reach and chair alarm activated   Transfers   Sit to Stand 4  Minimal assistance   Additional items Assist x 1; Armrests; Increased time required;Verbal cues  (cues for environment due to decreased vision)   Stand to Sit 4  Minimal assistance   Additional items Assist x 1; Armrests; Verbal cues; Other  (A for controlled decent to chair)   Functional Mobility   Functional Mobility 4  Minimal assistance   Additional Comments short distance   Additional items Rolling walker   Cognition   Overall Cognitive Status WFL   Arousal/Participation Cooperative   Attention Within functional limits Orientation Level Oriented X4   Memory Within functional limits   Following Commands Follows one step commands with increased time or repetition   Comments Identified pt by full name and wrist band  Pt able to participate in conversation appropriately this AM; flat affect  Activity Tolerance   Activity Tolerance Patient limited by fatigue   Medical Staff Made Aware spoke w/ RN, Nancy Raphael   Assessment   Assessment Pt seen for OT session this AM  Pt completed UBD w/ mod I after set- up, and feeding w/ mod I after set- up seated in chair at tray table for liquids  Pt benefits from cues for environment set- up due to decreased vision  Pt completed sit <> stand from chair w/ min A and benefits from cues / assist for controlled decent to chair  Continue to recommend that pt can return home to [facility] w/ family support / assist and home OT when medically stable for discharge from acute care  Will continue to follow   Plan   Treatment Interventions ADL retraining;Functional transfer training; Activityengagement; Endurance training   Goal Expiration Date 02/21/18   Treatment Day 1   OT Frequency 3-5x/wk   Recommendation   OT Discharge Recommendation Home OT  (return to [facility] w/ family support / assist and home OT)   OT - OK to Discharge (when medically stable)   Barthel Index   Feeding 5   Bathing 0   Grooming Score 5   Dressing Score 5   Bladder Score 10   Bowels Score 10   Toilet Use Score 5   Transfers (Bed/Chair) Score 10   Mobility (Level Surface) Score 10   Stairs Score 0   Barthel Index Score 60   Sania Troy, OT

## 2018-02-16 NOTE — PLAN OF CARE
Problem: PHYSICAL THERAPY ADULT  Goal: Performs mobility at highest level of function for planned discharge setting  See evaluation for individualized goals  Treatment/Interventions: Functional transfer training, LE strengthening/ROM, Therapeutic exercise, Endurance training, Patient/family training, Equipment eval/education, Gait training, OT          See flowsheet documentation for full assessment, interventions and recommendations  Outcome: Adequate for Discharge  Prognosis: Good  Problem List: Decreased strength, Decreased endurance, Impaired balance, Impaired vision, Obesity  Assessment: pt demosntrated slight improvement in balance and leg strength, pt able to amb with rw and supervision  pt also practiced toilet transfer but needed min assist due to low surface  pt tolerated all activity well but reported leg fatigue afterward and refused ther ex  pt continues to be appropriate for home discharge with home PT, family support  Barriers to Discharge: None     Recommendation: Home with family support, Home PT     PT - OK to Discharge: Yes    See flowsheet documentation for full assessment

## 2018-02-16 NOTE — ASSESSMENT & PLAN NOTE
· JOHN, POA, suspect secondary to hypotension, AFib with RVR and lisinopril with hypotension causing on a regulatory failure per Nephrology  · Cr improved to 1 45 today  · Baseline Cr 1 1-1 3  · Continue to hold lisinopril  Diuretic resumed today per nephrology  · Renal ultrasound: No hydronephrosis or other acute findings to explain acute kidney injury  · Avoid nephrotoxins

## 2018-02-16 NOTE — ASSESSMENT & PLAN NOTE
· Recent excision of cancerous skin lesion on L knee  · Daily wound care with hydrogen peroxide and vaseline per patient's dermatologist

## 2018-02-16 NOTE — ASSESSMENT & PLAN NOTE
· Without acute exacerbation  · Cardiology following  · Diuretic resumed today with torsemide 10 mg daily per nephrology  · Continue to hold lisinopril per nephrology  · CXR negative for pulmonary edema/vascular congestion  · Echocardiogram EF 45% (prior 50%)   · Continue metoprolol 100 mg b i d   · Monitor I/Os and daily weights, weights stable  · Pulse ox stable on RA

## 2018-02-16 NOTE — ASSESSMENT & PLAN NOTE
· Patient recently had a wound culture completed of a left lower extremity wound by Dermatology on 2/9/18  · Culture results: positive for Staphylococcus aureus, sensitive to clindamycin  · Continue clindamycin as ordered  · Patient afebrile, no leukocytosis  · Outpatient follow-up with dermatology

## 2018-02-16 NOTE — PHYSICAL THERAPY NOTE
Physical Therapy Progress Note     02/16/18 1040   Pain Assessment   Pain Assessment 0-10   Restrictions/Precautions   Other Precautions Visual impairment; Fall Risk;Multiple lines; Chair Alarm   General   Chart Reviewed Yes   Family/Caregiver Present No   Cognition   Overall Cognitive Status WFL   Orientation Level Oriented X4   Subjective   Subjective pt reports no change       Transfers   Sit to Stand 5  Supervision   Stand to Sit 5  Supervision   Toilet transfer 4  Minimal assistance  (grab bars)   Ambulation/Elevation   Gait pattern WNL; Excessively slow   Gait Assistance 5  Supervision   Additional items Assist x 1;Verbal cues   Assistive Device Rolling walker   Distance 210'   Balance   Static Sitting Good   Dynamic Sitting Fair +   Static Standing Fair   Dynamic Standing Fair -   Ambulatory Fair -   Endurance Deficit   Endurance Deficit No   Activity Tolerance   Activity Tolerance Patient tolerated treatment well   Nurse Made Aware yes   Exercises   TKR (pt refused, legs too tired from amb and toileting)   Assessment   Prognosis Good   Problem List Decreased strength;Decreased endurance; Impaired balance; Impaired vision;Obesity   Assessment pt demosntrated slight improvement in balance and leg strength, pt able to amb with rw and supervision  pt also practiced toilet transfer but needed min assist due to low surface  pt tolerated all activity well but reported leg fatigue afterward and refused ther ex  pt continues to be appropriate for home discharge with home PT, family support   Barriers to Discharge None   Goals   Patient Goals go home when cleared to    STG Expiration Date 02/21/18   Plan   Treatment/Interventions Functional transfer training;LE strengthening/ROM; Therapeutic exercise; Endurance training;Patient/family training;Equipment eval/education; Bed mobility;Gait training;Spoke to nursing   PT Frequency 2-3x/wk   Recommendation   Recommendation Home with family support;Home PT   PT - OK to Discharge Yes     [place], PT

## 2018-02-16 NOTE — PROGRESS NOTES
NEPHROLOGY PROGRESS NOTE   Ney Calderon 80 y o  male MRN: [id number]  Unit/Bed#: -01 Encounter: [id number]  Reason for Consult:  Acute kidney injury    ASSESSMENT and PLAN:  1  Acute kidney injury:   resolving  Creatinine peaked at 4 58  Currently down to 1 45  · JOHN pre renal/ATN due to hypotension/AFib with a RVR/cardiorenal in the setting of altered renal hemodynamics due to lisinopril  Renal ultrasound shows no hydronephrosis  Rash/recent-antibiotic use- No eosinophilia/no urine eosinophils noted  · Renal ultrasound:  Right kidney 10 6 cm, left kidney 11 7 cm, no hydronephrosis, no masses, simple cysts bilaterally, prostate enlarged with mass effect on bladder base  Bladder normally distended  · Urinalysis:  4-10 RBCs, trace proteinuria  · Continue to hold lisinopril, add back diuretic today  2  CKD stage 3:  Baseline creatinine ~1 3  Etiology consistent with cardiorenal, hypertensive nephrosclerosis  3  Atrial fibrillation with RVR:  Rate has improved-controlled  Cardiology following  · On Eliquis-renal dosed  · On beta-blocker-metoprolol  · Digoxin discontinued  3  Nonischemic cardiomyopathy, chronic systolic heart failure:    · Echocardiogram:  Ejection fraction 45%, diffuse hypokinesis, left ventricle dilated, wall thickness increased  No significant valve dysfunction, aortic root dilated 4 4 cm  · Can resume torsemide daily  · Cardiology following  4  Hypertension/low blood pressure:   hold lisinopril  · Blood pressure on the low side but stable  · Currently on metoprolol  · Torsemide 10 mg daily started 2/16  5  Elevated total bilirubin:  Liver enzymes within normal limits  6  Rash:  Recent Keflex use, afebrile  7  Skin cancer left lower leg:  Status post excision  8  Dysphagia:  Status post EGD  Food impaction resolved  Diffuse ulceration noted bleeding controlled with injection and cauterization     SUMMARY OF RECOMMENDATIONS:  · Diuretic-resume torsemide 10 mg daily    Monitor for effectiveness  · Hold lisinopril  · Monitor daily weight, I&O    SUBJECTIVE / INTERVAL HISTORY:  +rash-less itching  No shortness of breath  Able to take oral nourishment but currently only on liquids  OBJECTIVE:  Current Weight: Weight - Scale: 104 kg (229 lb 4 5 oz)  Vitals:    02/15/18 2128 02/15/18 2247 02/16/18 0600 02/16/18 0709   BP:  99/54  110/75   BP Location:  Left arm  Left arm   Pulse:  75  71   Resp:  20  19   Temp:  98 2 °F (36 8 °C)  98 4 °F (36 9 °C)   TempSrc:  Oral  Oral   SpO2: 92% 90%  92%   Weight:   104 kg (229 lb 4 5 oz)    Height:           Intake/Output Summary (Last 24 hours) at 02/16/18 1128  Last data filed at 02/15/18 2325   Gross per 24 hour   Intake             1190 ml   Output             1240 ml   Net              -50 ml     Physical Exam   Constitutional: He is oriented to person, place, and time  He appears well-developed  No distress  HENT:   Head: Normocephalic and atraumatic  Eyes: Conjunctivae are normal  No scleral icterus  Neck: Neck supple  No JVD present  Cardiovascular: Normal rate  An irregular rhythm present  Exam reveals no S3 and no S4  No murmur heard  Pulmonary/Chest: Effort normal and breath sounds normal    Abdominal: Soft  Normal appearance and bowel sounds are normal    Musculoskeletal: He exhibits edema (Bilateral lower extremity edema left greater than right)  Neurological: He is alert and oriented to person, place, and time  Skin: Skin is warm  Rash noted  Psychiatric: He has a normal mood and affect         Medications:    Current Facility-Administered Medications:     acetaminophen (TYLENOL) tablet 650 mg, 650 mg, Oral, Q4H PRN, Sandra Hoyt PA-C, 650 mg at 02/13/18 2120    albuterol (PROVENTIL HFA,VENTOLIN HFA) inhaler 2 puff, 2 puff, Inhalation, Q4H PRN, Sandra Hoyt PA-C    apixaban (ELIQUIS) tablet 2 5 mg, 2 5 mg, Oral, BID, Sandra Hoyt PA-C, 2 5 mg at 02/16/18 0839    aspirin (ECOTRIN LOW STRENGTH) EC tablet 81 mg, 81 mg, Oral, Daily, Sandra Hoyt PA-C, 81 mg at 02/16/18 6109    calcium carbonate (TUMS) chewable tablet 1,000 mg, 1,000 mg, Oral, Daily PRN, Sandra Hoyt PA-C    citalopram (CeleXA) tablet 10 mg, 10 mg, Oral, Daily, Sandra Hoyt PA-C, 10 mg at 02/16/18 0838    clindamycin (CLEOCIN) capsule 300 mg, 300 mg, Oral, Q12H [address], Nathan Odom PA-C, 300 mg at 02/16/18 5603    collagenase (SANTYL) ointment, , Topical, Daily, Nathan Odom PA-C    diphenhydrAMINE-zinc acetate (BENADRYL) 2-0 1 % cream, , Topical, TID PRN, Delvin Pitts PA-C    loratadine (CLARITIN) tablet 10 mg, 10 mg, Oral, Daily, Sandra Hoyt PA-C, 10 mg at 02/16/18 0839    metoprolol (LOPRESSOR) injection 5 mg, 5 mg, Intravenous, Q6H PRN, Kolton Martinez PA-C, 5 mg at 02/15/18 1229    metoprolol tartrate (LOPRESSOR) tablet 100 mg, 100 mg, Oral, Q12H [address], Ruth Purcell MD, 100 mg at 02/16/18 5645    nystatin (MYCOSTATIN) powder, , Topical, TID, Delvin Pitts PA-C    ondansetron [facility]) injection 4 mg, 4 mg, Intravenous, Q6H PRN, Sandra Hoyt PA-C, 4 mg at 02/12/18 2116    oxybutynin (DITROPAN) tablet 5 mg, 5 mg, Oral, BID, Sandra Hoyt PA-C, 5 mg at 02/16/18 0838    pantoprazole (PROTONIX) 80 mg in sodium chloride 0 9 % 100 mL infusion, 8 mg/hr, Intravenous, Continuous, Marion Ramey PA-C, Last Rate: 10 mL/hr at 02/16/18 0442, 8 mg/hr at 02/16/18 0442    senna (SENOKOT) tablet 8 6 mg, 1 tablet, Oral, Daily, Sandra Hoyt PA-C, 8 6 mg at 02/16/18 0839    sucralfate (CARAFATE) oral suspension 1,000 mg, 1,000 mg, Oral, Q6H [address], Marion Ramey PA-C, 1,000 mg at 02/16/18 0442    torsemide (DEMADEX) tablet 10 mg, 10 mg, Oral, Daily, Ruth Purcell MD, 10 mg at 02/16/18 5574    Laboratory Results:    Results from last 7 days  Lab Units 02/16/18  0443 02/15/18  0603 02/14/18  0547 02/13/18  0530 02/12/18  0524 02/11/18  1806   WBC Thousand/uL 6 79  --  6 24  -- 8 61 10 13   HEMOGLOBIN g/dL 10 9*  --  12 1  --  14 6 15 7   HEMATOCRIT % 33 4*  --  36 4*  --  44 6 48 3   PLATELETS Thousands/uL 219  --  201  --  285 292   SODIUM mmol/L 140 139 136 132* 135* 136   POTASSIUM mmol/L 4 3 4 1 4 5 5 0 5 0 4 5   CHLORIDE mmol/L 105 105 100 96* 95* 94*   CO2 mmol/L 28 27 27 25 25 29   BUN mg/dL 48* 60* 72* 82* 51* 39*   CREATININE mg/dL 1 45* 1 50* 2 24* 4 06* 4 58* 3 78*   CALCIUM mg/dL 8 4 8 8 8 5 8 2* 8 4 9 3   MAGNESIUM mg/dL 2 2  --   --  1 9 1 9  --    PHOSPHORUS mg/dL  --   --   --  4 4*  --   --    TOTAL PROTEIN g/dL  --   --   --  6 0*  --  7 2   GLUCOSE RANDOM mg/dL 84 97 94 121 119 111     Previous work up:

## 2018-02-16 NOTE — ASSESSMENT & PLAN NOTE
· History of permanent atrial fibrillation  · Cardiology consulted, appreciate input  · Heart rate currently controlled  · Continue metoprolol 100 mg twice daily  · Hold Eliquis given findings of ulceration, active bleeding on EGD  · Digoxin discontinued due to renal function and risk of toxicity

## 2018-02-16 NOTE — PROGRESS NOTES
Progress Note Chucho Arreguin 1935, 80 y o  male MRN: [id number]    Unit/Bed#: -01 Encounter: [id number]    Primary Care Provider: Michael Matthews DO   Date and time admitted to hospital: 2/11/2018  5:21 PM        Dysphagia with suspected food impaction   Assessment & Plan    · Patient with difficulty swallowing, noted to be choking on his lunch on 2/14/18  · GI consulted  · EGD: impacted food material in distal esophagus which was gently pushed down with scope  Erythema and ulceration noted underneath  No strictures  Fresh blood clots in distal bulb  Diffuse ulceration noted in the bulb extending down the duodenal sweep with active bleeding  Bleeding controlled with epinephrine injection and cauterization with gold probe  · Clear liquid diet  · Continue Protonix gtt and Carafate  · Hg 10 9 today from 12 1 yesterday  · Repeat H&H this evening  · Hold Eliquis  * Atrial fibrillation with RVR ([facility])   Assessment & Plan    · History of permanent atrial fibrillation  · Cardiology consulted, appreciate input  · Heart rate currently controlled  · Continue metoprolol 100 mg twice daily  · Hold Eliquis given findings of ulceration, active bleeding on EGD  · Digoxin discontinued due to renal function and risk of toxicity  Acute kidney injury [facility])   Assessment & Plan    · JOHN, POA, suspect secondary to hypotension, AFib with RVR and lisinopril with hypotension causing on a regulatory failure per Nephrology  · Cr improved to 1 45 today  · Baseline Cr 1 1-1 3  · Continue to hold lisinopril  Diuretic resumed today per nephrology  · Renal ultrasound: No hydronephrosis or other acute findings to explain acute kidney injury  · Avoid nephrotoxins  Chronic systolic CHF (congestive heart failure) ([facility] [address] )   Assessment & Plan    · Without acute exacerbation  · Cardiology following  · Diuretic resumed today with torsemide 10 mg daily per nephrology    · Continue to hold lisinopril per nephrology  · CXR negative for pulmonary edema/vascular congestion  · Echocardiogram EF 45% (prior 50%)   · Continue metoprolol 100 mg b i d   · Monitor I/Os and daily weights, weights stable  · Pulse ox stable on RA  Staph aureus infection of the left lower extremity   Assessment & Plan    · Patient recently had a wound culture completed of a left lower extremity wound by Dermatology on 2/9/18  · Culture results: positive for Staphylococcus aureus, sensitive to clindamycin  · Continue clindamycin as ordered  · Patient afebrile, no leukocytosis  · Outpatient follow-up with dermatology  Depression   Assessment & Plan    · Continue Celexa          Hypertension   Assessment & Plan    · Blood pressure acceptable  · Continue metoprolol  · Zestril held due to JOHN  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   Assessment & Plan    · Recent excision of cancerous skin lesion on L knee  · Daily wound care with hydrogen peroxide and vaseline per patient's dermatologist          VTE Pharmacologic Prophylaxis:   Pharmacologic: Apixaban (Eliquis)  Mechanical VTE Prophylaxis in Place: Yes    Patient Centered Rounds: I have performed bedside rounds with nursing staff today  Discussions with Specialists or Other Care Team Provider: nursing; paged GI, awaiting return call  Education and Discussions with Family / Patient: patient    Time Spent for Care: 20 minutes  More than 50% of total time spent on counseling and coordination of care as described above  Current Length of Stay: 5 day(s)    Current Patient Status: Inpatient   Certification Statement: The patient will continue to require additional inpatient hospital stay due to IV Protonix gtt for ulceration on EGD, need to monitor Hg  Discharge Plan: When medically stable, likely in 48 hours  Code Status: Level 1 - Full Code      Subjective:   Patient denies chest pain, SOB, abdominal pain   He admits to noticing some increased swelling in his b/l LE  Last BM was 3 days ago  He admits to an episode of urinary retention last evening  Objective:     Vitals:   Temp (24hrs), Av 3 °F (36 8 °C), Min:97 4 °F (36 3 °C), Max:98 7 °F (37 1 °C)    HR:  [] 71  Resp:  [18-22] 19  BP: ()/(54-96) 110/75  SpO2:  [90 %-95 %] 92 %  Body mass index is 32 9 kg/m²  Input and Output Summary (last 24 hours): Intake/Output Summary (Last 24 hours) at 18 1225  Last data filed at 02/15/18 2325   Gross per 24 hour   Intake             1190 ml   Output             1240 ml   Net              -50 ml       Physical Exam:     Physical Exam   Constitutional: He is oriented to person, place, and time  No distress  HENT:   Head: Normocephalic and atraumatic  Eyes: Conjunctivae are normal    Neck: Normal range of motion  Cardiovascular: An irregularly irregular rhythm present  Pulmonary/Chest: Effort normal and breath sounds normal  No respiratory distress  Abdominal: Soft  Bowel sounds are normal  There is no tenderness  Musculoskeletal: He exhibits edema  Neurological: He is alert and oriented to person, place, and time  Skin: Skin is warm and dry  Dry dressing in place on left knee  Psychiatric: He has a normal mood and affect  Nursing note and vitals reviewed        Additional Data:     Labs:      Results from last 7 days  Lab Units 18  0443   WBC Thousand/uL 6 79   HEMOGLOBIN g/dL 10 9*   HEMATOCRIT % 33 4*   PLATELETS Thousands/uL 219   NEUTROS PCT % 61   LYMPHS PCT % 18   MONOS PCT % 11   EOS PCT % 10*       Results from last 7 days  Lab Units 18  0443  18  0530   SODIUM mmol/L 140  < > 132*   POTASSIUM mmol/L 4 3  < > 5 0   CHLORIDE mmol/L 105  < > 96*   CO2 mmol/L 28  < > 25   BUN mg/dL 48*  < > 82*   CREATININE mg/dL 1 45*  < > 4 06*   CALCIUM mg/dL 8 4  < > 8 2*   TOTAL PROTEIN g/dL  --   --  6 0*   BILIRUBIN TOTAL mg/dL  --   --  1 30*   ALK PHOS U/L  --   --  31*   ALT U/L  --   --  42 AST U/L  --   --  88*   GLUCOSE RANDOM mg/dL 84  < > 121   < > = values in this interval not displayed  Results from last 7 days  Lab Units 02/12/18  0524   INR  1 48*       * I Have Reviewed All Lab Data Listed Above  * Additional Pertinent Lab Tests Reviewed: All Labs Within Last 24 Hours Reviewed    Imaging:    Imaging Reports Reviewed Today Include: none  Imaging Personally Reviewed by Myself Includes:  none    Recent Cultures (last 7 days):           Last 24 Hours Medication List:     Current Facility-Administered Medications:  acetaminophen 650 mg Oral Q4H PRN Sandra Hoyt PA-C    albuterol 2 puff Inhalation Q4H PRN Sandra Hoyt PA-C    aspirin 81 mg Oral Daily Sandra Hoyt PA-C    calcium carbonate 1,000 mg Oral Daily PRN Sandra Hoyt PA-C    citalopram 10 mg Oral Daily Sandra Hoyt PA-C    clindamycin 300 mg Oral Q12H [address] Nathan Odom PA-C    collagenase  Topical Daily Nathan Cummins PA-C    diphenhydrAMINE-zinc acetate  Topical TID PRN Nellyrosy Urias PA-C    loratadine 10 mg Oral Daily Sandra Hoyt PA-C    metoprolol 5 mg Intravenous Q6H PRN Ashley Kennedy PA-C    metoprolol tartrate 100 mg Oral Q12H [address] Malcolm Moya MD    nystatin  Topical TID Nelly Urias PA-C    ondansetron 4 mg Intravenous Q6H PRN Sandra Hoyt PA-C    oxybutynin 5 mg Oral BID Sandra Hoyt PA-C    pantoprozole (PROTONIX) infusion (Continuous) 8 mg/hr Intravenous Continuous Luther Kern PA-C Last Rate: 8 mg/hr (02/16/18 0442)   senna 1 tablet Oral Daily Sandra Hoyt PA-C    sucralfate 1,000 mg Oral Q6H [address] Luther Kern PA-C    torsemide 10 mg Oral Daily Malcolm Moya MD         Today, Patient Was Seen By: Austin Nails PA-C    ** Please Note: Dictation voice to text software may have been used in the creation of this document   **

## 2018-02-17 LAB
ANION GAP SERPL CALCULATED.3IONS-SCNC: 8 MMOL/L (ref 4–13)
BUN SERPL-MCNC: 35 MG/DL (ref 5–25)
CALCIUM SERPL-MCNC: 8.6 MG/DL (ref 8.3–10.1)
CHLORIDE SERPL-SCNC: 103 MMOL/L (ref 100–108)
CO2 SERPL-SCNC: 27 MMOL/L (ref 21–32)
CREAT SERPL-MCNC: 1.55 MG/DL (ref 0.6–1.3)
GFR SERPL CREATININE-BSD FRML MDRD: 41 ML/MIN/1.73SQ M
GLUCOSE SERPL-MCNC: 96 MG/DL (ref 65–140)
HCT VFR BLD AUTO: 36.6 % (ref 36.5–49.3)
HGB BLD-MCNC: 11.8 G/DL (ref 12–17)
POTASSIUM SERPL-SCNC: 4.2 MMOL/L (ref 3.5–5.3)
SODIUM SERPL-SCNC: 138 MMOL/L (ref 136–145)

## 2018-02-17 PROCEDURE — 99232 SBSQ HOSP IP/OBS MODERATE 35: CPT | Performed by: PHYSICIAN ASSISTANT

## 2018-02-17 PROCEDURE — 85018 HEMOGLOBIN: CPT | Performed by: PHYSICIAN ASSISTANT

## 2018-02-17 PROCEDURE — 99232 SBSQ HOSP IP/OBS MODERATE 35: CPT | Performed by: INTERNAL MEDICINE

## 2018-02-17 PROCEDURE — 92526 ORAL FUNCTION THERAPY: CPT

## 2018-02-17 PROCEDURE — C9113 INJ PANTOPRAZOLE SODIUM, VIA: HCPCS | Performed by: PHYSICIAN ASSISTANT

## 2018-02-17 PROCEDURE — 80048 BASIC METABOLIC PNL TOTAL CA: CPT | Performed by: INTERNAL MEDICINE

## 2018-02-17 PROCEDURE — 85014 HEMATOCRIT: CPT | Performed by: PHYSICIAN ASSISTANT

## 2018-02-17 RX ADMIN — SODIUM CHLORIDE 8 MG/HR: 9 INJECTION, SOLUTION INTRAVENOUS at 12:46

## 2018-02-17 RX ADMIN — APIXABAN 2.5 MG: 2.5 TABLET, FILM COATED ORAL at 10:31

## 2018-02-17 RX ADMIN — CITALOPRAM HYDROBROMIDE 10 MG: 20 TABLET ORAL at 08:52

## 2018-02-17 RX ADMIN — SUCRALFATE 1000 MG: 1 SUSPENSION ORAL at 16:54

## 2018-02-17 RX ADMIN — SODIUM CHLORIDE 8 MG/HR: 9 INJECTION, SOLUTION INTRAVENOUS at 21:56

## 2018-02-17 RX ADMIN — ASPIRIN 81 MG: 81 TABLET, COATED ORAL at 08:51

## 2018-02-17 RX ADMIN — OXYBUTYNIN CHLORIDE 5 MG: 5 TABLET ORAL at 08:51

## 2018-02-17 RX ADMIN — SENNOSIDES 8.6 MG: 8.6 TABLET, FILM COATED ORAL at 08:50

## 2018-02-17 RX ADMIN — SUCRALFATE 1000 MG: 1 SUSPENSION ORAL at 12:46

## 2018-02-17 RX ADMIN — APIXABAN 2.5 MG: 2.5 TABLET, FILM COATED ORAL at 16:54

## 2018-02-17 RX ADMIN — CLINDAMYCIN HYDROCHLORIDE 300 MG: 150 CAPSULE ORAL at 20:48

## 2018-02-17 RX ADMIN — NYSTATIN: 100000 POWDER TOPICAL at 08:56

## 2018-02-17 RX ADMIN — METOPROLOL TARTRATE 100 MG: 100 TABLET ORAL at 20:49

## 2018-02-17 RX ADMIN — SUCRALFATE 1000 MG: 1 SUSPENSION ORAL at 05:12

## 2018-02-17 RX ADMIN — LORATADINE 10 MG: 10 TABLET ORAL at 08:50

## 2018-02-17 RX ADMIN — TORSEMIDE 10 MG: 10 TABLET ORAL at 08:50

## 2018-02-17 RX ADMIN — SUCRALFATE 1000 MG: 1 SUSPENSION ORAL at 21:56

## 2018-02-17 RX ADMIN — OXYBUTYNIN CHLORIDE 5 MG: 5 TABLET ORAL at 16:54

## 2018-02-17 RX ADMIN — METOPROLOL TARTRATE 100 MG: 100 TABLET ORAL at 08:51

## 2018-02-17 RX ADMIN — NYSTATIN: 100000 POWDER TOPICAL at 20:49

## 2018-02-17 RX ADMIN — NYSTATIN: 100000 POWDER TOPICAL at 16:58

## 2018-02-17 RX ADMIN — CLINDAMYCIN HYDROCHLORIDE 300 MG: 150 CAPSULE ORAL at 08:50

## 2018-02-17 NOTE — ASSESSMENT & PLAN NOTE
· Patient with difficulty swallowing, noted to be choking on his lunch on 2/14/18  · GI consulted  · EGD: impacted food material in distal esophagus which was gently pushed down with scope  Erythema and ulceration noted underneath  No strictures  Fresh blood clots in distal bulb  Diffuse ulceration noted in the bulb extending down the duodenal sweep with active bleeding  Bleeding controlled with epinephrine injection and cauterization with gold probe  · Diet advanced to surgical soft  · Speech eval obtained, recommending dysphagia level 2    · Continue Protonix gtt and Carafate  · Eliquis held last evening given ulceration, active bleeding noted on EGD  Discussed with GI, Dr Edilberto Loredo, this AM, resume Eliquis  · Hg stable at 11 2 last evening  · Monitor H&H q12h  · Patient without evidence of active bleeding at this time  Last BM reportedly 4 days ago

## 2018-02-17 NOTE — ASSESSMENT & PLAN NOTE
· JOHN, POA, suspect secondary to hypotension, AFib with RVR and lisinopril with hypotension causing on a regulatory failure per Nephrology  · Nephrology following  · Torsemide resumed yesterday  Cr 1 55 today from 1 45 yesterday  · Baseline Cr 1 1-1 3  · Continue to hold lisinopril per nephrology  · Renal ultrasound: No hydronephrosis or other acute findings to explain acute kidney injury

## 2018-02-17 NOTE — ASSESSMENT & PLAN NOTE
· History of permanent atrial fibrillation  · Cardiology consulted, appreciate input  · Heart rate currently controlled  · Continue metoprolol 100 mg twice daily  · Resume Eliquis per discussion with GI, Dr Elena Landeros, this AM  Monitor H&H q12h  · Eliquis dose 2 5 mg b i d  given renal dysfunction  · Digoxin discontinued due to renal function and risk of toxicity  · Per cardiology, if Cr normalized, could resume digoxin

## 2018-02-17 NOTE — ASSESSMENT & PLAN NOTE
· Blood pressure acceptable  · Continue metoprolol  · Lisinopril held due to JOHN  Continue to hold per nephrology

## 2018-02-17 NOTE — PROGRESS NOTES
Cardiology Progress Note - Skip Martinez 80 y o  male MRN: [id number]    Unit/Bed#: -01 Encounter: [id number]      Assessment:  Principal Problem:    Atrial fibrillation with RVR ([facility] [address] )  Active Problems:    Hypertension    Chronic systolic CHF (congestive heart failure) (HCC)    Acute kidney injury ([facility] [address] )    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    Depression    Staph aureus infection of the left lower extremity    Stage 3 chronic kidney disease    Azotemia    Dysphagia with suspected food impaction    Food impaction of esophagus      Plan:  Patient is comfortable at the present time  He has no chest pain or significant dyspnea  He is out of bed to a chair  He is on intravenous Protonix  His creatinine today is 1 55  His BUN is improved at 35  The Nephrology and Gastroenterology notes are appreciated  He remains on torsemide 10 mg per day  Subjective:   Patient seen and examined  No significant events overnight   negative  Objective:     Vitals: Blood pressure 141/70, pulse 77, temperature 98 7 °F (37 1 °C), temperature source Oral, resp  rate 18, height 5' 10" (1 778 m), weight 102 kg (225 lb 8 5 oz), SpO2 95 %  , Body mass index is 32 36 kg/m² , Orthostatic Blood Pressures    Flowsheet Row Most Recent Value   Blood Pressure  141/70 filed at 02/17/2018 0709   Patient Position - Orthostatic VS  Sitting filed at 02/17/2018 0709      ,      Intake/Output Summary (Last 24 hours) at 02/17/18 0834  Last data filed at 02/17/18 0739   Gross per 24 hour   Intake              525 ml   Output              644 ml   Net             -119 ml             Physical Exam:    GEN: Skip Martinez appears well, alert and oriented x 3, pleasant and cooperative   NECK: supple, no carotid bruits, no JVD or HJR  HEART: normal rate, regular rhythm, normal S1 and S2, no murmurs, clicks, gallops or rubs   LUNGS: clear to auscultation bilaterally; no wheezes, rales, or rhonchi   ABDOMEN: normal bowel sounds, soft, no tenderness, no distention  EXTREMITIES: edema  SKIN: warm and well perfused, no suspicious lesions on exposed skin    Labs & Results:    Admission on 02/11/2018   No results displayed because visit has over 200 results  Xr Chest 1 View Portable    Result Date: 2/12/2018  Narrative: CHEST INDICATION:  Chest pain COMPARISON:  6/9/2015 VIEWS:   AP frontal IMAGES:  1 FINDINGS: There is mild enlargement of the cardiac silhouette  The lungs are clear  No pneumothorax or pleural effusion  Visualized osseous structures appear within normal limits for the patient's age  Impression: Mild enlargement of cardiac silhouette  Clear lungs  Workstation performed: [id number]     [facility] Kidney And Bladder    Result Date: 2/12/2018  Narrative: RENAL ULTRASOUND INDICATION: Acute kidney injury  COMPARISON: Chest CT dated 2/16/2011  TECHNIQUE:   Ultrasound of the retroperitoneum was performed with a curvilinear transducer utilizing volumetric sweeps and still imaging techniques  FINDINGS: KIDNEYS: Symmetric and normal size  Right kidney:  10 6 cm x 5 3 cm  Normal echogenicity and contour  No suspicious masses detected  3 6 cm simple peripelvic cyst at the mid to upper pole  No hydronephrosis  No shadowing calculi  No perinephric fluid collections  Left kidney:  11 7 cm x 6 1 cm  Normal echogenicity and contour  No suspicious masses detected  2 9 cm simple cyst at the left lower pole  No hydronephrosis  No shadowing calculi  No perinephric fluid collections  URETERS: Nonvisualized  BLADDER: Normally distended  No focal thickening or mass lesions  Bilateral ureteral jets detected  Prostate appears enlarged with mass effect on the bladder base  Impression: 1  No hydronephrosis or other acute findings to explain acute kidney injury  2   Simple renal cyst, bilaterally  No suspicious masses or lesions requiring imaging follow-up   Workstation performed: [id number]       EKG personally reviewed by Asa Tang MD  Counseling / Coordination of Care  Total floor / unit time spent today 30 minutes  Greater than 50% of total time was spent with the patient and / or family counseling and / or coordination of care

## 2018-02-17 NOTE — ASSESSMENT & PLAN NOTE
· Without acute exacerbation  · Cardiology following  · Diuretic resumed yesterday with torsemide 10 mg daily per nephrology  · Continue to hold lisinopril per nephrology  · CXR negative for pulmonary edema/vascular congestion  · Echocardiogram EF 45% (prior 50%)   · Continue metoprolol 100 mg b i d   · Monitor I/Os and daily weights, weights stable  · Pulse ox stable on RA

## 2018-02-17 NOTE — PROGRESS NOTES
NEPHROLOGY PROGRESS NOTE   Colleen Gonzalez 80 y o  male MRN: [id number]  Unit/Bed#: -01 Encounter: [id number]  Reason for Consult: JOHN    ASSESSMENT and PLAN:    1  Acute kidney injury:   resolved    · JOHN pre renal/ATN due to hypotension/AFib with a RVR/cardiorenal in the setting of altered renal hemodynamics due to lisinopril   Renal ultrasound shows no hydronephrosis  Rash/recent-antibiotic use- No eosinophilia/no urine eosinophils noted  · Renal ultrasound:  Right kidney 10 6 cm, left kidney 11 7 cm, no hydronephrosis, no masses, simple cysts bilaterally, prostate enlarged with mass effect on bladder base  Bladder normally distended  · Urinalysis:  4-10 RBCs, trace proteinuria  2  CKD stage 3:  Baseline creatinine ~1 3  Etiology consistent with cardiorenal, hypertensive nephrosclerosis  Will need outpatient nephrology follow-up  3  Atrial fibrillation with RVR:  Rate has improved-controlled   Cardiology following  · On Eliquis-renal dosed  · On beta-blocker-metoprolol  · Digoxin discontinued  3  Nonischemic cardiomyopathy, chronic systolic heart failure:    · Echocardiogram:  Ejection fraction 45%, diffuse hypokinesis, left ventricle dilated, wall thickness increased  No significant valve dysfunction, aortic root dilated 4 4 cm  · Can resume torsemide daily  · Cardiology following  4  Hypertension/low blood pressure:   hold lisinopril  · Blood pressure on the low side but stable  · Currently on metoprolol  · Torsemide 10 mg daily started 2/16  5  Elevated total bilirubin:  Liver enzymes within normal limits  6  Rash:  Recent Keflex use, afebrile  7  Skin cancer left lower leg:  Status post excision  8  Dysphagia:  Status post EGD  Food impaction resolved  Diffuse ulceration noted bleeding controlled with injection and cauterization    SUMMARY OF RECOMMENDATIONS:    · Renal function is stable and back to baseline  Blood pressure is quite stable off ACE-inhibitor  Continue to monitor off    Diuretic has been restarted  · Nothing further to add from a renal standpoint  We will sign off at this time  Can follow up as an outpatient  Thank you call for any questions or concerns  SUBJECTIVE / INTERVAL HISTORY:    No overnight events    OBJECTIVE:  Current Weight: Weight - Scale: 102 kg (225 lb 8 5 oz)  Vitals:    02/16/18 2100 02/16/18 2300 02/17/18 0709 02/17/18 1513   BP: 125/68 104/67 141/70 136/78   BP Location:  Right arm Right arm Right arm   Pulse: 65 67 77 88   Resp:  18 18 18   Temp:  99 2 °F (37 3 °C) 98 7 °F (37 1 °C) 99 2 °F (37 3 °C)   TempSrc:  Oral Oral Oral   SpO2:  94% 95% 96%   Weight:   102 kg (225 lb 8 5 oz)    Height:           Intake/Output Summary (Last 24 hours) at 02/17/18 1705  Last data filed at 02/17/18 1611   Gross per 24 hour   Intake              525 ml   Output             1900 ml   Net            -1375 ml       Physical Exam   Constitutional: He is oriented to person, place, and time  He appears well-developed and well-nourished  No distress  HENT:   Head: Normocephalic and atraumatic  Eyes: Pupils are equal, round, and reactive to light  No scleral icterus  Neck: Normal range of motion  Neck supple  Cardiovascular: Normal rate, regular rhythm and normal heart sounds  Exam reveals no gallop and no friction rub  No murmur heard  Pulmonary/Chest: Effort normal and breath sounds normal  No respiratory distress  He has no wheezes  He has no rales  He exhibits no tenderness  Abdominal: Soft  Bowel sounds are normal  He exhibits no distension  There is no tenderness  There is no rebound  Musculoskeletal: Normal range of motion  He exhibits no edema  Neurological: He is alert and oriented to person, place, and time  Skin: No rash noted  He is not diaphoretic  Psychiatric: He has a normal mood and affect         Medications:    Current Facility-Administered Medications:     acetaminophen (TYLENOL) tablet 650 mg, 650 mg, Oral, Q4H PRN, Sandra Hoyt PA-C, 650 mg at 02/13/18 2120    albuterol (PROVENTIL HFA,VENTOLIN HFA) inhaler 2 puff, 2 puff, Inhalation, Q4H PRN, Sandra Hoyt PA-C    apixaban (ELIQUIS) tablet 2 5 mg, 2 5 mg, Oral, BID, Mariangel Rosenbaum PA-C, 2 5 mg at 02/17/18 1654    aspirin (ECOTRIN LOW STRENGTH) EC tablet 81 mg, 81 mg, Oral, Daily, Sandra Hoyt PA-C, 81 mg at 02/17/18 0851    calcium carbonate (TUMS) chewable tablet 1,000 mg, 1,000 mg, Oral, Daily PRN, Sandra Hoyt PA-C    citalopram (CeleXA) tablet 10 mg, 10 mg, Oral, Daily, Sandra Hoyt PA-C, 10 mg at 02/17/18 2878    clindamycin (CLEOCIN) capsule 300 mg, 300 mg, Oral, Q12H [facility], Nathan Odom PA-C, 300 mg at 02/17/18 2468    collagenase (SANTYL) ointment, , Topical, Daily, Nathan Odom PA-C    diphenhydrAMINE-zinc acetate (BENADRYL) 2-0 1 % cream, , Topical, TID PRN, Shanon Alvarez PA-C    loratadine (CLARITIN) tablet 10 mg, 10 mg, Oral, Daily, Sandra Hoyt PA-C, 10 mg at 02/17/18 0850    metoprolol (LOPRESSOR) injection 5 mg, 5 mg, Intravenous, Q6H PRN, Ciro Rizzo PA-C, 5 mg at 02/15/18 1229    metoprolol tartrate (LOPRESSOR) tablet 100 mg, 100 mg, Oral, Q12H [facility], Shay Cruz MD, 100 mg at 02/17/18 0851    nystatin (MYCOSTATIN) powder, , Topical, TID, Shanon Alvarez PA-C    ondansetron [facility]) injection 4 mg, 4 mg, Intravenous, Q6H PRN, Sandra Hoyt PA-C, 4 mg at 02/12/18 2116    oxybutynin (DITROPAN) tablet 5 mg, 5 mg, Oral, BID, Sandra Hoyt PA-C, 5 mg at 02/17/18 1654    pantoprazole (PROTONIX) 80 mg in sodium chloride 0 9 % 100 mL infusion, 8 mg/hr, Intravenous, Continuous, Kianna Muhammad PA-C, Last Rate: 10 mL/hr at 02/17/18 1246, 8 mg/hr at 02/17/18 1246    senna (SENOKOT) tablet 8 6 mg, 1 tablet, Oral, Daily, Sandra Hoyt PA-C, 8 6 mg at 02/17/18 0850    sucralfate (CARAFATE) oral suspension 1,000 mg, 1,000 mg, Oral, Q6H [facility], Sandra Diaz PA-C, 1,000 mg at 02/17/18 6714   torsemide [facility]) tablet 10 mg, 10 mg, Oral, Daily, Denice Major MD, 10 mg at 02/17/18 0850    Laboratory Results:    Results from last 7 days  Lab Units 02/17/18  0503 02/16/18  1833 02/16/18  0443 02/15/18  0603 02/14/18  0547 02/13/18  0530 02/12/18  0524 02/11/18  1806   WBC Thousand/uL  --   --  6 79  --  6 24  --  8 61 10 13   HEMOGLOBIN g/dL  --  11 2* 10 9*  --  12 1  --  14 6 15 7   HEMATOCRIT %  --  34 5* 33 4*  --  36 4*  --  44 6 48 3   PLATELETS Thousands/uL  --   --  219  --  201  --  285 292   SODIUM mmol/L 138  --  140 139 136 132* 135* 136   POTASSIUM mmol/L 4 2  --  4 3 4 1 4 5 5 0 5 0 4 5   CHLORIDE mmol/L 103  --  105 105 100 96* 95* 94*   CO2 mmol/L 27  --  28 27 27 25 25 29   BUN mg/dL 35*  --  48* 60* 72* 82* 51* 39*   CREATININE mg/dL 1 55*  --  1 45* 1 50* 2 24* 4 06* 4 58* 3 78*   CALCIUM mg/dL 8 6  --  8 4 8 8 8 5 8 2* 8 4 9 3   MAGNESIUM mg/dL  --   --  2 2  --   --  1 9 1 9  --    PHOSPHORUS mg/dL  --   --   --   --   --  4 4*  --   --    TOTAL PROTEIN g/dL  --   --   --   --   --  6 0*  --  7 2   GLUCOSE RANDOM mg/dL 96  --  84 97 94 121 119 111

## 2018-02-17 NOTE — PROGRESS NOTES
Progress Note Stefany Feng 1935, 80 y o  male MRN: [id number]    Unit/Bed#: -01 Encounter: [id number]    Primary Care Provider: Brianna Bill DO   Date and time admitted to hospital: 2/11/2018  5:21 PM        Dysphagia with suspected food impaction   Assessment & Plan    · Patient with difficulty swallowing, noted to be choking on his lunch on 2/14/18  · GI consulted  · EGD: impacted food material in distal esophagus which was gently pushed down with scope  Erythema and ulceration noted underneath  No strictures  Fresh blood clots in distal bulb  Diffuse ulceration noted in the bulb extending down the duodenal sweep with active bleeding  Bleeding controlled with epinephrine injection and cauterization with gold probe  · Diet advanced to surgical soft  · Speech eval obtained, recommending dysphagia level 2    · Continue Protonix gtt and Carafate  · Eliquis held last evening given ulceration, active bleeding noted on EGD  Discussed with GI, Dr Raymond Reno, this AM, resume Eliquis  · Hg stable at 11 2 last evening  · Monitor H&H q12h  · Patient without evidence of active bleeding at this time  Last BM reportedly 4 days ago  * Atrial fibrillation with RVR ([facility])   Assessment & Plan    · History of permanent atrial fibrillation  · Cardiology consulted, appreciate input  · Heart rate currently controlled  · Continue metoprolol 100 mg twice daily  · Resume Eliquis per discussion with GI, Dr Raymond Reno, this AM  Monitor H&H q12h  · Eliquis dose 2 5 mg b i d  given renal dysfunction  · Digoxin discontinued due to renal function and risk of toxicity  · Per cardiology, if Cr normalized, could resume digoxin  Acute kidney injury [facility])   Assessment & Plan    · JOHN, POA, suspect secondary to hypotension, AFib with RVR and lisinopril with hypotension causing on a regulatory failure per Nephrology  · Nephrology following  · Torsemide resumed yesterday   Cr 1 55 today from 1 45 yesterday  · Baseline Cr 1 1-1 3  · Continue to hold lisinopril per nephrology  · Renal ultrasound: No hydronephrosis or other acute findings to explain acute kidney injury  Chronic systolic CHF (congestive heart failure) ([facility] [address] )   Assessment & Plan    · Without acute exacerbation  · Cardiology following  · Diuretic resumed yesterday with torsemide 10 mg daily per nephrology  · Continue to hold lisinopril per nephrology  · CXR negative for pulmonary edema/vascular congestion  · Echocardiogram EF 45% (prior 50%)   · Continue metoprolol 100 mg b i d   · Monitor I/Os and daily weights, weights stable  · Pulse ox stable on RA  Staph aureus infection of the left lower extremity   Assessment & Plan    · Patient recently had a wound culture completed of a left lower extremity wound by Dermatology on 2/9/18  · Culture results: positive for Staphylococcus aureus, sensitive to clindamycin  · Continue clindamycin as ordered  · Patient afebrile, no leukocytosis  · Outpatient follow-up with dermatology  Depression   Assessment & Plan    · Patient appears very depressed, tearful today  · Obtain psychiatry consult  · Continue Celexa  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   Assessment & Plan    · Recent excision of cancerous skin lesion on L knee  · Daily wound care with hydrogen peroxide and vaseline per patient's dermatologist        Hypertension   Assessment & Plan    · Blood pressure acceptable  · Continue metoprolol  · Lisinopril held due to JOHN  Continue to hold per nephrology  VTE Pharmacologic Prophylaxis:   Pharmacologic: Apixaban (Eliquis)  Mechanical VTE Prophylaxis in Place: Yes    Patient Centered Rounds: I have performed bedside rounds with nursing staff today  Discussions with Specialists or Other Care Team Provider: nursing and GI       Education and Discussions with Family / Patient: patient and pt's daughter-in-law at bedside    Time Spent for Care: 20 minutes  More than 50% of total time spent on counseling and coordination of care as described above  Current Length of Stay: 6 day(s)    Current Patient Status: Inpatient   Certification Statement: The patient will continue to require additional inpatient hospital stay due to need to monitor Hg with resuming Eliquis  Protonix gtt  Discharge Plan: Likely d/c in 24-48 hours  Code Status: Level 1 - Full Code      Subjective:   Patient sitting up in chair, reports some indigestion at this time  He denies chest pain, SOB, abdominal pain  Notes constipation the past 4 days, reports that he has been passing flatus  He denies nausea or vomiting  Denies lower extremity pain  Patient appeared depressed, stating that is doesn't matter "if he lives or dies " He denies any suicidal ideations or plans to harm himself  Reports that it doesn't make a difference if he stay here and "stares at these 4 walls"  or goes home and stares at the 4 walls there  Objective:     Vitals:   Temp (24hrs), Av °F (37 2 °C), Min:98 7 °F (37 1 °C), Max:99 2 °F (37 3 °C)    HR:  [65-77] 77  Resp:  [18] 18  BP: (104-141)/(59-70) 141/70  SpO2:  [94 %-96 %] 95 %  Body mass index is 32 36 kg/m²  Input and Output Summary (last 24 hours): Intake/Output Summary (Last 24 hours) at 18 1129  Last data filed at 18 1001   Gross per 24 hour   Intake              525 ml   Output             1094 ml   Net             -569 ml       Physical Exam:     Physical Exam   Constitutional: He is oriented to person, place, and time  No distress  HENT:   Head: Normocephalic and atraumatic  Eyes: Conjunctivae are normal    Neck: Normal range of motion  Cardiovascular: An irregularly irregular rhythm present  Pulmonary/Chest: Effort normal and breath sounds normal  No respiratory distress  Abdominal: Soft  Bowel sounds are normal  He exhibits distension  There is no tenderness  Musculoskeletal: He exhibits edema  Neurological: He is alert and oriented to person, place, and time  Skin: Skin is warm and dry  He is not diaphoretic  Psychiatric: He exhibits a depressed mood  Tearful   Nursing note and vitals reviewed  Additional Data:     Labs:      Results from last 7 days  Lab Units 02/16/18  1833 02/16/18  0443   WBC Thousand/uL  --  6 79   HEMOGLOBIN g/dL 11 2* 10 9*   HEMATOCRIT % 34 5* 33 4*   PLATELETS Thousands/uL  --  219   NEUTROS PCT %  --  61   LYMPHS PCT %  --  18   MONOS PCT %  --  11   EOS PCT %  --  10*       Results from last 7 days  Lab Units 02/17/18  0503  02/13/18  0530   SODIUM mmol/L 138  < > 132*   POTASSIUM mmol/L 4 2  < > 5 0   CHLORIDE mmol/L 103  < > 96*   CO2 mmol/L 27  < > 25   BUN mg/dL 35*  < > 82*   CREATININE mg/dL 1 55*  < > 4 06*   CALCIUM mg/dL 8 6  < > 8 2*   TOTAL PROTEIN g/dL  --   --  6 0*   BILIRUBIN TOTAL mg/dL  --   --  1 30*   ALK PHOS U/L  --   --  31*   ALT U/L  --   --  42   AST U/L  --   --  88*   GLUCOSE RANDOM mg/dL 96  < > 121   < > = values in this interval not displayed  Results from last 7 days  Lab Units 02/12/18  0524   INR  1 48*       * I Have Reviewed All Lab Data Listed Above  * Additional Pertinent Lab Tests Reviewed:  All Labs Within Last 24 Hours Reviewed    Imaging:    Imaging Reports Reviewed Today Include: none  Imaging Personally Reviewed by Myself Includes:  none    Recent Cultures (last 7 days):           Last 24 Hours Medication List:     Current Facility-Administered Medications:  acetaminophen 650 mg Oral Q4H PRN Sandra Hoyt PA-C    albuterol 2 puff Inhalation Q4H PRN Sandra Hoyt PA-C    apixaban 2 5 mg Oral BID Natali Forbes PA-C    aspirin 81 mg Oral Daily Sandra Hoyt PA-C    calcium carbonate 1,000 mg Oral Daily PRN Sandra Hoyt PA-C    citalopram 10 mg Oral Daily Sandra Hoyt PA-C    clindamycin 300 mg Oral Q12H [address] Nathan Tete Crutch, PA-C    collagenase  Topical Daily Nathan Ocampo PA-C diphenhydrAMINE-zinc acetate  Topical TID PRN Ze Edwards PA-C    loratadine 10 mg Oral Daily Sandra Hoyt PA-C    metoprolol 5 mg Intravenous Q6H PRN Man [place] PA-C    metoprolol tartrate 100 mg Oral Q12H [facility] Sky Frausto MD    nystatin  Topical TID Ze Edwards PA-C    ondansetron 4 mg Intravenous Q6H PRN Sandra Hoyt PA-C    oxybutynin 5 mg Oral BID Sandra Hoyt PA-C    pantoprozole (PROTONIX) infusion (Continuous) 8 mg/hr Intravenous Continuous Wandy Gregg PA-C Last Rate: 8 mg/hr (02/16/18 6084)   senna 1 tablet Oral Daily Sandra Hoyt PA-C    sucralfate 1,000 mg Oral Q6H [facility] Wandy Gregg PA-C    torsemide 10 mg Oral Daily Sky Frausto MD         Today, Patient Was Seen By: Mikala Foster PA-C    ** Please Note: Dictation voice to text software may have been used in the creation of this document   **

## 2018-02-17 NOTE — SPEECH THERAPY NOTE
Speech Language/Pathology    Speech/Language Pathology Progress Note    Patient Name: Luly Licea  [id number] Date: 2/17/2018     Problem List  Patient Active Problem List   Diagnosis    Hypertension    Atrial fibrillation with RVR (HCC)    Chronic systolic CHF (congestive heart failure) (HCC)    Acute kidney injury ([address] )    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    Depression    Staph aureus infection of the left lower extremity    Stage 3 chronic kidney disease    Azotemia    Dysphagia with suspected food impaction    Food impaction of esophagus        Past Medical History  Past Medical History:   Diagnosis Date    Atrial fibrillation ([address] )     Hypertension     Macular degeneration         Past Surgical History  Past Surgical History:   Procedure Laterality Date    ESOPHAGOGASTRODUODENOSCOPY N/A 2/15/2018    Procedure: ESOPHAGOGASTRODUODENOSCOPY (EGD); Surgeon: Zeenat Walters MD;  Location: [facility];  Service: Gastroenterology         Subjective:    Pt was seen for skilled ST to assess for swallowing safety  Pt was pleasant and cooperative for session    Objective:   EGD showed impacted food in distal esophagus due to dysmotility  There was no observed stricture  Pt had erythema and ulceration at GEJ  GI has recommended advancement to surgical soft diet  Pt was assessed during breakfast meal with scrambled eggs with cheese, english muffin, coffee (thin), cookie  Pt demonstrated difficulty biting english muffin stating "I think this was a mistake to get " Mastication was prolonged with english muffin  Pt explained that he does not have bottom dentures which makes it difficult for him to chew  He then refused to eat remainder of english muffin  With scrambled eggs pt demonstrated mildly prolonged mastication with functional breakdown and oral clearance  No s/s of dysphagia noted    With cookie pt stated "I can chew this but I have to let it melt a little in my mouth and smash it with my tongue "   No s/s of aspiration noted with thin liquids  No s/s of pharyngeal or esophageal dysphagia noted with all trials today  No s/s of aspiration noted  SLP discussed diet options in this facility and recommendations for a minced diet due to risk for continued esophageal impaction with difficulty fully chewing harder food items  Pt expressed agreement  Assessment:    Pt is reporting difficulty chewing harder food items (e g , cookie, english muffin) due to lack of dentition  Suspect that pt would be at risk for continued dysphagia if he is unable to adequately breakdown food items for safe swallowing  He tolerated soft solids without difficulty       Plan/Recommendations:    Consider a dysphagia level 2 diet (? Surgical/light meal as per MD)  Thin liquids  Speech to follow

## 2018-02-18 VITALS
HEART RATE: 93 BPM | HEIGHT: 70 IN | DIASTOLIC BLOOD PRESSURE: 55 MMHG | SYSTOLIC BLOOD PRESSURE: 121 MMHG | WEIGHT: 231.7 LBS | RESPIRATION RATE: 16 BRPM | BODY MASS INDEX: 33.17 KG/M2 | OXYGEN SATURATION: 91 % | TEMPERATURE: 97.6 F

## 2018-02-18 PROBLEM — N17.9 ACUTE KIDNEY INJURY (HCC): Status: RESOLVED | Noted: 2018-02-11 | Resolved: 2018-02-18
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
## 2018-02-18 PROBLEM — F32.A DEPRESSION: Chronic | Status: ACTIVE | Noted: 2018-02-12

## 2018-02-18 PROBLEM — I50.22 CHRONIC SYSTOLIC CHF (CONGESTIVE HEART FAILURE) (HCC): Chronic | Status: ACTIVE | Noted: 2018-02-11

## 2018-02-18 PROBLEM — T18.128A FOOD IMPACTION OF ESOPHAGUS: Status: RESOLVED | Noted: 2018-02-11 | Resolved: 2018-02-18

## 2018-02-18 PROBLEM — N18.30 STAGE 3 CHRONIC KIDNEY DISEASE (HCC): Chronic | Status: ACTIVE | Noted: 2018-02-13

## 2018-02-18 LAB
ANION GAP SERPL CALCULATED.3IONS-SCNC: 9 MMOL/L (ref 4–13)
BUN SERPL-MCNC: 27 MG/DL (ref 5–25)
CALCIUM SERPL-MCNC: 8.3 MG/DL (ref 8.3–10.1)
CHLORIDE SERPL-SCNC: 103 MMOL/L (ref 100–108)
CO2 SERPL-SCNC: 25 MMOL/L (ref 21–32)
CREAT SERPL-MCNC: 1.43 MG/DL (ref 0.6–1.3)
GFR SERPL CREATININE-BSD FRML MDRD: 45 ML/MIN/1.73SQ M
GLUCOSE SERPL-MCNC: 100 MG/DL (ref 65–140)
HCT VFR BLD AUTO: 34.9 % (ref 36.5–49.3)
HEMOCCULT STL QL: POSITIVE
HGB BLD-MCNC: 11.5 G/DL (ref 12–17)
POTASSIUM SERPL-SCNC: 4.3 MMOL/L (ref 3.5–5.3)
SODIUM SERPL-SCNC: 137 MMOL/L (ref 136–145)

## 2018-02-18 PROCEDURE — 82272 OCCULT BLD FECES 1-3 TESTS: CPT | Performed by: INTERNAL MEDICINE

## 2018-02-18 PROCEDURE — 99232 SBSQ HOSP IP/OBS MODERATE 35: CPT | Performed by: INTERNAL MEDICINE

## 2018-02-18 PROCEDURE — 85018 HEMOGLOBIN: CPT | Performed by: PHYSICIAN ASSISTANT

## 2018-02-18 PROCEDURE — 99239 HOSP IP/OBS DSCHRG MGMT >30: CPT | Performed by: PHYSICIAN ASSISTANT

## 2018-02-18 PROCEDURE — 85014 HEMATOCRIT: CPT | Performed by: PHYSICIAN ASSISTANT

## 2018-02-18 PROCEDURE — 80048 BASIC METABOLIC PNL TOTAL CA: CPT | Performed by: INTERNAL MEDICINE

## 2018-02-18 RX ORDER — CLINDAMYCIN HYDROCHLORIDE 300 MG/1
300 CAPSULE ORAL EVERY 12 HOURS SCHEDULED
Qty: 14 CAPSULE | Refills: 0 | Status: SHIPPED | OUTPATIENT
Start: 2018-02-18 | End: 2018-02-26 | Stop reason: ALTCHOICE

## 2018-02-18 RX ORDER — PANTOPRAZOLE SODIUM 40 MG/1
40 TABLET, DELAYED RELEASE ORAL
Qty: 30 TABLET | Refills: 0 | Status: SHIPPED | OUTPATIENT
Start: 2018-02-19 | End: 2018-04-03 | Stop reason: SDUPTHER

## 2018-02-18 RX ORDER — METOPROLOL TARTRATE 100 MG/1
100 TABLET ORAL EVERY 12 HOURS SCHEDULED
Qty: 60 TABLET | Refills: 0 | Status: SHIPPED | OUTPATIENT
Start: 2018-02-18

## 2018-02-18 RX ORDER — POLYETHYLENE GLYCOL 3350 17 G/17G
17 POWDER, FOR SOLUTION ORAL DAILY PRN
Status: DISCONTINUED | OUTPATIENT
Start: 2018-02-18 | End: 2018-02-18 | Stop reason: HOSPADM

## 2018-02-18 RX ORDER — ESCITALOPRAM OXALATE 10 MG/1
10 TABLET ORAL DAILY
Qty: 30 TABLET | Refills: 0 | Status: SHIPPED | OUTPATIENT
Start: 2018-02-19

## 2018-02-18 RX ORDER — SUCRALFATE ORAL 1 G/10ML
1000 SUSPENSION ORAL EVERY 6 HOURS SCHEDULED
Qty: 420 ML | Refills: 0 | Status: SHIPPED | OUTPATIENT
Start: 2018-02-18 | End: 2018-04-03 | Stop reason: ALTCHOICE

## 2018-02-18 RX ORDER — DOCUSATE SODIUM 100 MG/1
100 CAPSULE, LIQUID FILLED ORAL 2 TIMES DAILY
Status: DISCONTINUED | OUTPATIENT
Start: 2018-02-18 | End: 2018-02-18

## 2018-02-18 RX ORDER — ESCITALOPRAM OXALATE 10 MG/1
10 TABLET ORAL DAILY
Status: DISCONTINUED | OUTPATIENT
Start: 2018-02-19 | End: 2018-02-18 | Stop reason: HOSPADM

## 2018-02-18 RX ORDER — PANTOPRAZOLE SODIUM 40 MG/1
40 TABLET, DELAYED RELEASE ORAL
Status: DISCONTINUED | OUTPATIENT
Start: 2018-02-19 | End: 2018-02-18 | Stop reason: HOSPADM

## 2018-02-18 RX ORDER — POLYETHYLENE GLYCOL 3350 17 G/17G
17 POWDER, FOR SOLUTION ORAL DAILY
Status: DISCONTINUED | OUTPATIENT
Start: 2018-02-18 | End: 2018-02-18

## 2018-02-18 RX ORDER — TORSEMIDE 10 MG/1
10 TABLET ORAL DAILY
Qty: 30 TABLET | Refills: 0 | Status: SHIPPED | OUTPATIENT
Start: 2018-02-19

## 2018-02-18 RX ORDER — DOCUSATE SODIUM 100 MG/1
100 CAPSULE, LIQUID FILLED ORAL 2 TIMES DAILY PRN
Status: DISCONTINUED | OUTPATIENT
Start: 2018-02-18 | End: 2018-02-18 | Stop reason: HOSPADM

## 2018-02-18 RX ORDER — HYDROCODONE BITARTRATE AND ACETAMINOPHEN 5; 325 MG/1; MG/1
1 TABLET ORAL EVERY 6 HOURS PRN
Status: DISCONTINUED | OUTPATIENT
Start: 2018-02-18 | End: 2018-02-18

## 2018-02-18 RX ADMIN — SUCRALFATE 1000 MG: 1 SUSPENSION ORAL at 05:45

## 2018-02-18 RX ADMIN — ASPIRIN 81 MG: 81 TABLET, COATED ORAL at 09:21

## 2018-02-18 RX ADMIN — CITALOPRAM HYDROBROMIDE 10 MG: 20 TABLET ORAL at 09:20

## 2018-02-18 RX ADMIN — SUCRALFATE 1000 MG: 1 SUSPENSION ORAL at 11:01

## 2018-02-18 RX ADMIN — LORATADINE 10 MG: 10 TABLET ORAL at 09:21

## 2018-02-18 RX ADMIN — METOPROLOL TARTRATE 100 MG: 100 TABLET ORAL at 09:23

## 2018-02-18 RX ADMIN — NYSTATIN: 100000 POWDER TOPICAL at 09:24

## 2018-02-18 RX ADMIN — APIXABAN 2.5 MG: 2.5 TABLET, FILM COATED ORAL at 09:21

## 2018-02-18 RX ADMIN — OXYBUTYNIN CHLORIDE 5 MG: 5 TABLET ORAL at 09:19

## 2018-02-18 RX ADMIN — TORSEMIDE 10 MG: 10 TABLET ORAL at 09:21

## 2018-02-18 RX ADMIN — CLINDAMYCIN HYDROCHLORIDE 300 MG: 150 CAPSULE ORAL at 09:21

## 2018-02-18 RX ADMIN — SENNOSIDES 8.6 MG: 8.6 TABLET, FILM COATED ORAL at 09:19

## 2018-02-18 NOTE — PROGRESS NOTES
Progress Note Arturo Gonzales 1935, 80 y o  male MRN: [id number]    Unit/Bed#: -01 Encounter: [id number]    Primary Care Provider: Socorro Granger DO   Date and time admitted to hospital: 2/11/2018  5:21 PM        Dysphagia with suspected food impaction   Assessment & Plan    · Patient with difficulty swallowing, noted to be choking on his lunch on 2/14/18  · GI consulted  · EGD: impacted food material in distal esophagus which was gently pushed down with scope  Erythema and ulceration noted underneath  No strictures  Fresh blood clots in distal bulb  Diffuse ulceration noted in the bulb extending down the duodenal sweep with active bleeding  Bleeding controlled with epinephrine injection and cauterization with gold probe  · Speech eval obtained, recommending dysphagia level 2    · Diet advanced to dysphagia level 2, non ulcerogenic diet  · Discontinue Protonix gtt per discussion with GI  Start Protonix 40 mg p o  daily  · Continue Carafate  · Hg stable at 11 5 this AM         * Atrial fibrillation with RVR (HCC)   Assessment & Plan    · History of permanent atrial fibrillation  · Cardiology consulted, appreciate input  · Heart rate currently controlled  · Continue metoprolol 100 mg twice daily  · Continue Eliquis 2 5 mg b i d  · Digoxin discontinued due to renal function and risk of toxicity  · Per cardiology, if Cr normalized, could resume digoxin  Depression   Assessment & Plan    · Patient appearred very depressed, tearful yesterday  Mood appears improved today  · Obtain psychiatry consult  · Continue Celexa  Acute kidney injury [facility])   Assessment & Plan    · JOHN, POA, suspect secondary to hypotension, AFib with RVR and in the setting of alteral renal hemodynamics due to lisinopril per Nephrology  · Nephrology following  · Torsemide 10 mg daily started on 2/16  · Cr stable at 1 43 today    · Baseline Cr 1 1-1 3  · Continue to monitor off lisinopril per nephrology  · Renal ultrasound: No hydronephrosis or other acute findings to explain acute kidney injury  Chronic systolic CHF (congestive heart failure) ([facility] [address] )   Assessment & Plan    · Without acute exacerbation  · Cardiology following  · Diuretic resumed with torsemide 10 mg daily per nephrology on 2/16/18  · Continue to hold lisinopril per nephrology  · CXR negative for pulmonary edema/vascular congestion  · Echocardiogram EF 45% (prior 50%)   · Continue metoprolol 100 mg b i d   · Monitor I/Os and daily weights, weights stable  · Pulse ox stable on RA  Stage 3 chronic kidney disease   Assessment & Plan    · Baseline Cr 1 3   · Cr stable at 1 43 today  · Outpatient follow-up with nephrology  Staph aureus infection of the left lower extremity   Assessment & Plan    · Patient recently had a wound culture completed of a left lower extremity wound by Dermatology on 2/9/18  · Culture results: positive for Staphylococcus aureus, sensitive to clindamycin  · Continue clindamycin as ordered  · Patient remains afebrile, no leukocytosis  · Outpatient follow-up with dermatology  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   Assessment & Plan    · Recent excision of cancerous skin lesion on L knee  · Daily wound care with hydrogen peroxide and vaseline per patient's dermatologist        Hypertension   Assessment & Plan    · Blood pressure acceptable  · Continue metoprolol  · Lisinopril held due to JOHN  Continue to hold per nephrology  VTE Pharmacologic Prophylaxis:   Pharmacologic: Apixaban (Eliquis)  Mechanical VTE Prophylaxis in Place: Yes    Patient Centered Rounds: I have performed bedside rounds with nursing staff today  Discussions with Specialists or Other Care Team Provider: nursing    Education and Discussions with Family / Patient: patient    Time Spent for Care: 20 minutes    More than 50% of total time spent on counseling and coordination of care as described above  Current Length of Stay: 7 day(s)    Current Patient Status: Inpatient   Certification Statement: The patient will continue to require additional inpatient hospital stay due to awaiting psych consult    Discharge Plan: likely d/c in 24 hours pending psych consult  Code Status: Level 1 - Full Code      Subjective:   Patient reports feeling better today, reports that yesterday he was feeling down  He denies chest pain or SOB  No palpitations or lower extremity pain  Patient with bowel movement yesterday, unable to elaborate on appearance given poor vision  Tolerating diet  Objective:     Vitals:   Temp (24hrs), Av 4 °F (36 9 °C), Min:97 6 °F (36 4 °C), Max:99 2 °F (37 3 °C)    HR:  [65-93] 93  Resp:  [16-18] 16  BP: (121-136)/(55-85) 121/55  SpO2:  [91 %-96 %] 91 %  Body mass index is 33 25 kg/m²  Input and Output Summary (last 24 hours): Intake/Output Summary (Last 24 hours) at 18 1017  Last data filed at 18 0630   Gross per 24 hour   Intake              940 ml   Output             3000 ml   Net            -2060 ml       Physical Exam:     Physical Exam   Constitutional: He is oriented to person, place, and time  No distress  HENT:   Head: Normocephalic and atraumatic  Eyes: Conjunctivae are normal    Neck: Neck supple  Cardiovascular: An irregularly irregular rhythm present  Pulmonary/Chest: Effort normal  No respiratory distress  He has no decreased breath sounds  Abdominal: Soft  Bowel sounds are normal  There is no tenderness  Musculoskeletal: He exhibits edema (pitting edema of b/l LE, L>R)  Neurological: He is alert and oriented to person, place, and time  Skin: Skin is warm and dry  He is not diaphoretic  Left knee wound   Psychiatric:   Patient appears less depressed today  Nursing note and vitals reviewed        Additional Data:     Labs:      Results from last 7 days  Lab Units 18  0554  18  0443   WBC Thousand/uL  --   -- 6  79   HEMOGLOBIN g/dL 11 5*  < > 10 9*   HEMATOCRIT % 34 9*  < > 33 4*   PLATELETS Thousands/uL  --   --  219   NEUTROS PCT %  --   --  61   LYMPHS PCT %  --   --  18   MONOS PCT %  --   --  11   EOS PCT %  --   --  10*   < > = values in this interval not displayed  Results from last 7 days  Lab Units 02/18/18  0554  02/13/18  0530   SODIUM mmol/L 137  < > 132*   POTASSIUM mmol/L 4 3  < > 5 0   CHLORIDE mmol/L 103  < > 96*   CO2 mmol/L 25  < > 25   BUN mg/dL 27*  < > 82*   CREATININE mg/dL 1 43*  < > 4 06*   CALCIUM mg/dL 8 3  < > 8 2*   TOTAL PROTEIN g/dL  --   --  6 0*   BILIRUBIN TOTAL mg/dL  --   --  1 30*   ALK PHOS U/L  --   --  31*   ALT U/L  --   --  42   AST U/L  --   --  88*   GLUCOSE RANDOM mg/dL 100  < > 121   < > = values in this interval not displayed  Results from last 7 days  Lab Units 02/12/18  0524   INR  1 48*       * I Have Reviewed All Lab Data Listed Above  * Additional Pertinent Lab Tests Reviewed:  All Labs Within Last 24 Hours Reviewed    Imaging:    Imaging Reports Reviewed Today Include: none  Imaging Personally Reviewed by Myself Includes:  none    Recent Cultures (last 7 days):           Last 24 Hours Medication List:     Current Facility-Administered Medications:  acetaminophen 650 mg Oral Q4H PRN Sandra Hoyt PA-C    albuterol 2 puff Inhalation Q4H PRN Sandra Hoyt PA-C    apixaban 2 5 mg Oral BID Kathryn Wilkins PA-C    aspirin 81 mg Oral Daily Sandra Hoyt PA-C    calcium carbonate 1,000 mg Oral Daily PRN Sandra Hoyt PA-C    citalopram 10 mg Oral Daily Sandra Hoyt PA-C    clindamycin 300 mg Oral Q12H [address] Nathan BRIAN Odom PA-C    collagenase  Topical Daily Nathan Valiente PA-C    diphenhydrAMINE-zinc acetate  Topical TID PRN Danielle Anderson PA-C    docusate sodium 100 mg Oral BID PRN Kathryn Wilkins PA-C    loratadine 10 mg Oral Daily Sandra Hoyt PA-C    metoprolol 5 mg Intravenous Q6H PRN Juliana Solorzano PA-C    metoprolol tartrate 100 mg Oral Q12H [facility] Sole Tamayo MD    nystatin  Topical TID Jarek Funez PA-C    ondansetron 4 mg Intravenous Q6H PRN Sandra Hoyt PA-C    oxybutynin 5 mg Oral BID Sandra Hoyt PA-C    pantoprozole (PROTONIX) infusion (Continuous) 8 mg/hr Intravenous Continuous Kamlesh Cordon PA-C Last Rate: Stopped (02/18/18 0959)   polyethylene glycol 17 g Oral Daily PRN Brittny Woodson PA-C    senna 1 tablet Oral Daily Sandra Hoyt PA-C    sucralfate 1,000 mg Oral Q6H [facility] Kamlesh Cordon PA-C    torsemide 10 mg Oral Daily Sole Tamayo MD         Today, Patient Was Seen By: Brittny Woodson PA-C    ** Please Note: Dictation voice to text software may have been used in the creation of this document   **

## 2018-02-18 NOTE — ASSESSMENT & PLAN NOTE
· Patient appearred very depressed, tearful yesterday  Mood appears improved today  · Obtain psychiatry consult  · Continue Celexa

## 2018-02-18 NOTE — PROGRESS NOTES
Cardiology Progress Note - Shyann Trent 80 y o  male MRN: [id number]    Unit/Bed#: -01 Encounter: [id number]      Assessment:  Principal Problem:    Atrial fibrillation with RVR ([facility] )  Active Problems:    Hypertension    Chronic systolic CHF (congestive heart failure) (HCC)    Acute kidney injury ([facility] )    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    Depression    Staph aureus infection of the left lower extremity    Stage 3 chronic kidney disease    Azotemia    Dysphagia with suspected food impaction    Food impaction of esophagus      Plan:  Patient is comfortable today  He has no chest pain or significant dyspnea  He is on intravenous Protonix  The Gastroenterology in nephrology notes are appreciated  His creatinine today is 1 43  His potassium is 4 3  I have made no change in his medical regimen  Subjective:   Patient seen and examined  No significant events overnight   negative  Objective:     Vitals: Blood pressure 121/55, pulse 93, temperature 97 6 °F (36 4 °C), temperature source Oral, resp   rate 16, height 5' 10" (1 778 m), weight 105 kg (231 lb 11 3 oz), SpO2 91 % , Body mass index is 33 25 kg/m² , Orthostatic Blood Pressures    Flowsheet Row Most Recent Value   Blood Pressure  121/55 filed at 02/18/2018 0700   Patient Position - Orthostatic VS  Lying filed at 02/18/2018 0700      ,      Intake/Output Summary (Last 24 hours) at 02/18/18 4349  Last data filed at 02/18/18 0630   Gross per 24 hour   Intake              940 ml   Output             3150 ml   Net            -2210 ml             Physical Exam:    GEN: Shyann Trent appears well, alert and oriented x 3, pleasant and cooperative   NECK: supple, no carotid bruits, no JVD or HJR  HEART: normal rate, regular rhythm, normal S1 and S2, no murmurs, clicks, gallops or rubs   LUNGS: clear to auscultation bilaterally; no wheezes, rales, or rhonchi   ABDOMEN: normal bowel sounds, soft, no tenderness, no distention  EXTREMITIES: edema  SKIN: warm and well perfused, no suspicious lesions on exposed skin    Labs & Results:    Admission on 02/11/2018   No results displayed because visit has over 200 results  Xr Chest 1 View Portable    Result Date: 2/12/2018  Narrative: CHEST INDICATION:  Chest pain COMPARISON:  6/9/2015 VIEWS:   AP frontal IMAGES:  1 FINDINGS: There is mild enlargement of the cardiac silhouette  The lungs are clear  No pneumothorax or pleural effusion  Visualized osseous structures appear within normal limits for the patient's age  Impression: Mild enlargement of cardiac silhouette  Clear lungs  Workstation performed: [id number]     [facility] Kidney And Bladder    Result Date: 2/12/2018  Narrative: RENAL ULTRASOUND INDICATION: Acute kidney injury  COMPARISON: Chest CT dated 2/16/2011  TECHNIQUE:   Ultrasound of the retroperitoneum was performed with a curvilinear transducer utilizing volumetric sweeps and still imaging techniques  FINDINGS: KIDNEYS: Symmetric and normal size  Right kidney:  10 6 cm x 5 3 cm  Normal echogenicity and contour  No suspicious masses detected  3 6 cm simple peripelvic cyst at the mid to upper pole  No hydronephrosis  No shadowing calculi  No perinephric fluid collections  Left kidney:  11 7 cm x 6 1 cm  Normal echogenicity and contour  No suspicious masses detected  2 9 cm simple cyst at the left lower pole  No hydronephrosis  No shadowing calculi  No perinephric fluid collections  URETERS: Nonvisualized  BLADDER: Normally distended  No focal thickening or mass lesions  Bilateral ureteral jets detected  Prostate appears enlarged with mass effect on the bladder base  Impression: 1  No hydronephrosis or other acute findings to explain acute kidney injury  2   Simple renal cyst, bilaterally  No suspicious masses or lesions requiring imaging follow-up   Workstation performed: [id number]       EKG personally reviewed by Diandra Fernandez MD      Counseling / Coordination of Care  Total floor / unit time spent today 30 minutes  Greater than 50% of total time was spent with the patient and / or family counseling and / or coordination of care

## 2018-02-18 NOTE — PLAN OF CARE
Problem: DISCHARGE PLANNING - CARE MANAGEMENT  Goal: Discharge to post-acute care or home with appropriate resources  INTERVENTIONS:  - Conduct assessment to determine patient/family and health care team treatment goals, and need for post-acute services based on payer coverage, community resources, and patient preferences, and barriers to discharge  - Address psychosocial, clinical, and financial barriers to discharge as identified in assessment in conjunction with the patient/family and health care team  - Arrange appropriate level of post-acute services according to patient's   needs and preference and payer coverage in collaboration with the physician and health care team  - Communicate with and update the patient/family, physician, and health care team regarding progress on the discharge plan  - Arrange appropriate transportation to post-acute venues   Outcome: Completed Date Met: 02/18/18  LOS 7  Not a bundle; Not a readmission  Pt is discharging to home today with [facility]  Cm informed [facility] that pt is going home  No further DCP needed at this time

## 2018-02-18 NOTE — ASSESSMENT & PLAN NOTE
· History of permanent atrial fibrillation  · Cardiology consulted, appreciate input  · Heart rate currently controlled  · Continue metoprolol 100 mg twice daily  · Continue Eliquis 2 5 mg b i d  · Digoxin discontinued due to renal function and risk of toxicity  · Per cardiology, if Cr normalized, could resume digoxin

## 2018-02-18 NOTE — DISCHARGE SUMMARY
Discharge Summary - [address] [facility] Medicine    Patient Information: Juan James 80 y o  male MRN: [id number]  Unit/Bed#: -01 Encounter: [id number]    Discharging Physician / Practitioner: Jazmyn Huang PA-C  PCP: Judit Babcock DO  Admission Date: 2/11/2018  Discharge Date: 02/18/18    Disposition:     Home with [facility] Services (Reminder: Complete face to face encounter)     Reason for Admission: rash and chest pain    Discharge Diagnoses:     Principal Problem:    Atrial fibrillation ([facility] [address] )  Active Problems:    Dysphagia    Chronic systolic CHF (congestive heart failure) ([facility] [address] )    Depression    Hypertension    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    Staph aureus infection of the left lower extremity    Stage 3 chronic kidney disease    Azotemia  Resolved Problems:    Acute kidney injury ([facility] [address] )    Hyperkalemia    Hyponatremia    Food impaction of esophagus      Consultations During Hospital Stay:  · Cardiology  · Nephrology  · Gastroenterology  · Psychiatry  · PT/OT  Procedures Performed:     · EGD: Impacted food material in distal esophagus was genly pushed down with scope and noted erythema and ulceration underneath, more at the GEJ  No strictures  Normal stomach  Fresh blood clots in distal bulb and D2 washed off and suctioned  Diffuse ulceration noted in the bulb extending down the duodenal sweep with active bleeding  Attempted placing hemoclips but the bleeding continued and was controlled with epinephrine injection and cauterization with gold probe  Significant Findings / Test Results:     · Echocardiogram: EF 45%  There was mild diffuse hypokinesis  Wall thickness was mildly increased  · Chest x-ray:  Mild enlargement of cardiac silhouette  Lungs clear  · Renal ultrasound:  No hydronephrosis or other acute findings to explain JOHN  Simple renal cysts, bilaterally  · Troponin:  0 05, 0 06, 0 06   · JOHN      Incidental Findings:   · EGD:  Diffuse ulceration noted in the bulb extending down the duodenal sweep with active bleeding  Test Results Pending at Discharge (will require follow up): · None     Outpatient Tests Requested:  · Patient follow-up PCP  · Outpatient follow-up cardiologist Dr Sudarshan Chau  · Outpatient follow-up with Gastroenterology  · Outpatient follow-up Nephrology  Complications:  none    Hospital Course:     Murphy Delgadillo is a 80 y o  male patient with a history of atrial fibrillation, chronic systolic HF, NICM and hypertension who originally presented to the hospital on 2/11/2018 due to rash  Patient presented to the emergency department with a rash and chest pain  Patient has a history of chronic dry skin and reportedly developed an itchy rash on his upper extremities and trunk prior to admission  He also reported some intermittent chest pain which began 1 day prior to admission  On presentation to the hospital EKG was obtained which showed atrial fibrillation with rapid ventricular response  His creatinine was also noted to be elevated to 3 78 on presentation and increased as high as 4 58 during his hospitalization  Patient was admitted and initially placed on a Cardizem drip  Cardiology was consulted and repeat echocardiogram was obtained which showed an EF of 45% (prior 50%)  Patient's HR improved, Cardizem drip was discontinued and his metoprolol was increased to 100 mg b i d  Digoxin was held due to JOHN and Eliquis dose was reduced to 2 5 mg b i d  given his renal dysfunction  In regards to the patient's JOHN, POA, his furosemide and lisinopril were held  Nephrology consult was obtained  Renal ultrasound showed no evidence of hydronephrosis or other acute findings  According to nephrology, his JOHN was pre renal/ ATN due to hypotension, a-fib with RVR in the setting of altered renal hemodynamics due to lisinopril  His Cr improved throughout his hospitalization and diuretics were resumed with torsemide 10 mg daily   His Cr is stable at 1 43 on day of discharge  Nephrology recommended continuing to hold lisinopril on discharge  Patient is to follow-up with nephrology as an outpatient  The patient's rash was felt to be due to recent Keflex use, which he was recently placed on after having a skin cancer removed from his left lower leg  During the patient's hospitalization, the patient's dermatologist had contacted the patient's daughter-in-law regarding his recent wound culture which returned positive  Culture results were obtained and positive for staph aureus and patient was placed on p o  Clindamycin  During the patient's hospitalization, he was noted to have an episode of "choking" during lunch and reportedly had difficulty swallowing, tolerating secretions thereafter  Gastroenterology consult was obtained and the patient underwent EGD on 02/15/2018 which showed impacted food material in the distal esophagus which was gently pushed down with the scope and erythema was noted underneath  EGD also noted fresh blood clots in the distal bulb and D2 as well as diffuse ulceration in the wall extending down the duodenal sweep with active bleeding  Bleeding was controlled with epinephrine injection and cauterization with gold probe  Patient is on Eliquis for atrial fibrillation, which was held following EGD given noted ulceration, active bleeding  Discussed with GI who recommended resuming of Eliquis while inpatient  Patient's hemoglobin remained stable following EGD, Hg 11 5 on day of discharge  Patient was evaluated by speech therapy who recommended dysphagia level 2- mechanical soft diet  He is tolerating his diet without difficulty on day of discharge  Of note, patient has a history of depression and was noted to have an episode of being very depressed, tearful during his admission  Psychiatry consult was obtained, recommended changing patients Celexa to Lexapro 10 mg daily   Patient also has a history of enlarged prostate and did require straight cath for urinary retention during his hospitalization  Patient is without urinary complaints on day of discharge and was instructed to monitor is output and follow-up with a urologist as an outpatient  PT/OT evaluation was obtained, recommended home therapy  Patient is being discharged home where he resides with his daughter-in-law  He is being discharged with home health care  He is to follow-up with his PCP later this week  Condition at Discharge: stable     Discharge Day Visit / Exam:     * Please refer to separate progress note for these details *    Discussion with Family: patient's daughter-in-law at bedside      Discharge instructions/Information to patient and family:   See after visit summary for information provided to patient and family  Provisions for Follow-Up Care:  See after visit summary for information related to follow-up care and any pertinent home health orders  Planned Readmission: none    Discharge Statement:  I spent 45 minutes discharging the patient  This time was spent on the day of discharge  I had direct contact with the patient on the day of discharge  Greater than 50% of the total time was spent examining patient, answering all patient questions, arranging and discussing plan of care with patient as well as directly providing post-discharge instructions  Additional time then spent on discharge activities  Discharge Medications:  See after visit summary for reconciled discharge medications provided to patient and family  ** Please Note: This note has been constructed using a voice recognition system   **

## 2018-02-18 NOTE — SOCIAL WORK
LOS 7   Not a bundle; Not a readmission  Pt is discharging to home today with Z[facility]  Cm informed [facility] that pt is going home  No further DCP needed at this time

## 2018-02-18 NOTE — ASSESSMENT & PLAN NOTE
· Patient with difficulty swallowing, noted to be choking on his lunch on 2/14/18  · GI consulted  · EGD: impacted food material in distal esophagus which was gently pushed down with scope  Erythema and ulceration noted underneath  No strictures  Fresh blood clots in distal bulb  Diffuse ulceration noted in the bulb extending down the duodenal sweep with active bleeding  Bleeding controlled with epinephrine injection and cauterization with gold probe  · Speech eval obtained, recommending dysphagia level 2    · Diet advanced to dysphagia level 2, non ulcerogenic diet  · Discontinue Protonix gtt per discussion with GI  Start Protonix 40 mg p o  daily  · Continue Carafate     · Hg stable at 11 5 this AM

## 2018-02-18 NOTE — CONSULTS
Consultation - [id number] [facility] [address] [facility] E Nahid 80 y o  male MRN: [id number]  Unit/Bed#: -01 Encounter: [id number]      Assessment:  80-year-old male with history of depression who is currently hospitalized due to medical issues  Patient depressive symptoms waxes and wanes but overall his been adjusting well  He is not an imminent risk to self or others  Plan:   1  Continue supportive techniques and would recommend pastoral care as Mr [employer] is Judaism  2   Switch from Celexa to Lexapro 10 mg as it is more potent and has much less effect on QTC  Chief Complaint: "I had a rough day yesterday"    History of Present Illness    80-year-old male who was born and raised in [place]  He worked in [employer] and he was  for 54 years until his wife  in   He currently lives by himself and he has 2 children are not  close to him  He goes to [employer] regularly and he is a devout [address]  He struggled with depression on and off but never had suicidal thoughts and never tried to hurt himself  He was never admitted to psychiatric hospital   He tried few depression medications in the past but does not remember the names  His cognitive ability seems to be within the normal for his age        Physician Requesting Consult: Jeremy Soriano MD      Consults    Psychiatric Review Of Systems:  sleep: no  appetite changes: no  weight changes: no  energy/anergy: yes  interest/pleasure/anhedonia: yes  somatic symptoms: yes  anxiety/panic: no  william: no  guilty/hopeless: no  self injurious behavior/risky behavior: no    Historical Information   Past Psychiatric History:   None      Past Medical History:   Diagnosis Date    Atrial fibrillation (HCC)     Hypertension     Macular degeneration        Medical Review Of Systems:  Review of Systems    Meds/Allergies   all current active meds have been reviewed  No Known Allergies    Objective   Vital signs in last 24 hours:  Temp:  [97 6 °F (36 4 °C)-99 2 °F (37 3 °C)] 97 6 °F (36 4 °C)  HR:  [65-93] 93  Resp:  [16-18] 16  BP: (121-136)/(55-85) 121/55      Intake/Output Summary (Last 24 hours) at 02/18/18 1030  Last data filed at 02/18/18 0630   Gross per 24 hour   Intake              940 ml   Output             3000 ml   Net            -2060 ml       Mental Status Evaluation:  Appearance:  casually dressed   Behavior:  normal   Speech:  soft   Mood:  sad   Affect:  normal   Language: repeating phrases   Thought Process:  circumstantial   Thought Content:  normal   Perceptual Disturbances: None   Risk Potential: minimal   Sensorium:  person, place and time/date   Cognition:  grossly intact   Consciousness:  alert and awake    Attention: attention span and concentration were age appropriate   Intellect: within normal limits   Fund of Knowledge: vocabulary: fair   Insight:  age appropriate   Judgment: age appropriate   Muscle Strength and Tone: face and neck   Gait/Station: slow   Motor Activity: no abnormal movements     Lab Results: reveiwed    Counseling / Coordination of Care  Total floor / unit time spent today 60 minutes  Greater than 50% of total time was spent with the patient and / or family counseling and / or coordination of care   A description of the counseling / coordination of care:

## 2018-02-18 NOTE — ASSESSMENT & PLAN NOTE
· Without acute exacerbation  · Cardiology following  · Diuretic resumed with torsemide 10 mg daily per nephrology on 2/16/18  · Continue to hold lisinopril per nephrology  · CXR negative for pulmonary edema/vascular congestion  · Echocardiogram EF 45% (prior 50%)   · Continue metoprolol 100 mg b i d   · Monitor I/Os and daily weights, weights stable  · Pulse ox stable on RA

## 2018-02-18 NOTE — ASSESSMENT & PLAN NOTE
· Patient recently had a wound culture completed of a left lower extremity wound by Dermatology on 2/9/18  · Culture results: positive for Staphylococcus aureus, sensitive to clindamycin  · Continue clindamycin as ordered  · Patient remains afebrile, no leukocytosis  · Outpatient follow-up with dermatology

## 2018-02-18 NOTE — DISCHARGE INSTRUCTIONS
Follow-up with your family doctor and cardiologist  Establish care with a nephrologist, gastroenterologist and urologist  Contact your PCP or return to the emergency department if you develop chest pain, shortness of breath, palpitations, urinary retention, abdominal pain or nausea/ vomiting  Acute Kidney Injury   AMBULATORY CARE:   Acute kidney injury (JOHN) is also called acute kidney failure, or acute renal failure  JOHN happens when your kidneys suddenly stop working correctly  Normally, the kidneys remove fluid, chemicals, and waste from your blood  These wastes are turned into urine by your kidneys  JOHN usually happens over hours or days  When you have JOHN, your kidneys do not remove the waste, chemicals, or extra fluid from your body  A normal amount of urine is not produced  JOHN is usually temporary, but it may become a chronic kidney condition  Causes of JOHN:   · Decreased blood flow to the kidney, such as from hypercalcemia (high blood calcium level) or severe heart disease     · A disease or condition that affects the kidneys, such as hypertension (high blood pressure) or diabetes     · A blockage in the kidney or ureter, such as a kidney or bladder stone, enlarged prostate, or tumor  Common symptoms include the following: You may not have any symptoms with early or mild JOHN  As JOHN progresses, you may have any of the following:  · Decrease in the amount of urine or no urination    · Swelling in your arms, legs, or feet     · Weakness, drowsiness, or no appetite    · Nausea, flank pain, muscle twitching or muscle cramps    · Itchy skin, or your, breath or body smells like urine    · Behavior changes, confusion, disorientation, or seizures  Call 911 if:   · You have sudden chest pain or trouble breathing  Seek care immediately if:   · Your symptoms get worse  Contact your healthcare provider if:   · Your symptoms return      · Your blood sugar or blood pressure level is not within the range your healthcare provider recommends  · You have questions or concerns about your condition or care  Treatment for JOHN  depends upon the cause of your acute kidney injury and how severe it is  Usually, JOHN will be monitored in the hospital  If you have mild JOHN, you may be able to go home to recover  Your healthcare providers will treat the cause of your JOHN  You may need IV fluids if your JOHN was caused by little or no fluid in your body  You may need dialysis to remove waste and extra fluid from your body  Nutrition:  Your healthcare provider may tell you to eat food low in sodium (salt), potassium, phosphorus, or protein  A dietitian can help you plan your meals  Drink liquids as directed: Your healthcare provider may recommend that you drink a certain amount of liquids  This will help your kidneys work better and decrease your risk for dehydration  Ask how much liquid to drink each day and which liquids are best for you  What you can do to manage and prevent JOHN:   · Monitor and manage other health conditions  such as diabetes, high blood pressure, or heart disease  These conditions increase your risk for acute kidney injury  Take your medicines for these conditions as directed  Also, monitor your blood sugar and blood pressure levels as directed  Contact your healthcare provider if your levels are not in the range he or she says it should be  · Talk to your healthcare provider before you take over-the-counter-medicine  NSAIDs, stomach medicine, or laxatives may harm your kidneys and increase your risk for acute kidney injury  If it is okay to take the medicine, follow the directions on the package  Do not take more than directed  · Tell healthcare providers you have had acute kidney injury  before you get contrast liquid for an x-ray or CT scan  Your healthcare provider may give you medicine to prevent kidney problems caused by the liquid    Follow up with your healthcare provider as directed:  Write down your questions so you remember to ask them during your visits  © 2017 [address] [employer] Information is for End User's use only and may not be sold, redistributed or otherwise used for commercial purposes  All illustrations and images included in CareNotes® are the copyrighted property of [employer]  or [employer]  The above information is an  only  It is not intended as medical advice for individual conditions or treatments  Talk to your doctor, nurse or pharmacist before following any medical regimen to see if it is safe and effective for you  Food Impaction   WHAT YOU NEED TO KNOW:   What is food impaction? Food impaction occurs when food (often meat or fish bones) becomes stuck in your esophagus  Food impaction can occur if your esophagus does not function normally  Food impaction may also happen if you do not have teeth or do not chew your food completely  How is food impaction diagnosed and treated? Your healthcare provider will ask what your symptoms are, when they began, and what you recently ate  You may need an x-ray to help locate the food in your esophagus  You may need the following treatments:  · Medicines  may be given to relax your esophagus  This may help food pass into your stomach  · Endoscopy  is a procedure in which a scope (thin, flexible tube with a light) is used to examine your upper gastrointestinal tract  Devices, such as forceps, are used during endoscopy to pull out the food  Your healthcare provider may gently push the food through your esophagus and into your stomach  He may send tissue from your esophagus to the lab for tests  How can I prevent another episode of food impaction? · Sit up while you eat  Raise the head of your bed if you need to eat in bed  · Make sure your dentures fit well  Poorly fitting dentures make it difficult to chew  · Chew your food completely    Proper chewing will help with swallowing  · Drink water with meals  Water will help move food down to your stomach  · Follow up with your healthcare provider  You may need tests to check your ability to swallow  When should I seek immediate care? · You are unable to swallow your saliva  · Your breathing is noisy  · You have trouble breathing  · You have repeated vomiting or blood in your vomit  · You have pain in your chest or abdomen  When should I contact my healthcare provider? · You feel you have something stuck in your throat  · You choke or vomit after eating  · You have questions or concerns about your condition or care  CARE AGREEMENT:   You have the right to help plan your care  Learn about your health condition and how it may be treated  Discuss treatment options with your caregivers to decide what care you want to receive  You always have the right to refuse treatment  The above information is an  only  It is not intended as medical advice for individual conditions or treatments  Talk to your doctor, nurse or pharmacist before following any medical regimen to see if it is safe and effective for you  © 2017 [address] [facility] Information is for End User's use only and may not be sold, redistributed or otherwise used for commercial purposes  All illustrations and images included in CareNotes® are the copyrighted property of A D A M , Inc  or [employer]  A-fib (Atrial Fibrillation)   WHAT YOU NEED TO KNOW:   A-fib may come and go, or it may be a long-term condition  A-fib can cause blood clots, stroke, or heart failure  These conditions may become life-threatening  It is important to treat and manage a-fib to help prevent a blood clot, stroke, or heart failure          DISCHARGE INSTRUCTIONS:   Call 911 for any of the following:   · You have any of the following signs of a heart attack:      ¨ Squeezing, pressure, or pain in your chest that lasts longer than 5 minutes or returns    ¨ Discomfort or pain in your back, neck, jaw, stomach, or arm     ¨ Trouble breathing    ¨ Nausea or vomiting    ¨ Lightheadedness or a sudden cold sweat, especially with chest pain or trouble breathing    · You have any of the following signs of a stroke:      ¨ Numbness or drooping on one side of your face     ¨ Weakness in an arm or leg    ¨ Confusion or difficulty speaking    ¨ Dizziness, a severe headache, or vision loss  Seek care immediately if:  You have any of the following signs of a blood clot:  · You feel lightheaded, are short of breath, and have chest pain  · You cough up blood  · You have swelling, redness, pain, or warmth in your arm or leg  Contact your cardiologist or healthcare provider if:   · Your heart rate is higher than your healthcare provider said it should be  · You have new or worsening swelling in your legs, feet, ankles, or abdomen  · You are short of breath, even at rest      · You have questions or concerns about your condition or care  Medicines: You may need any of the following:  · Heart medicines  help control your heart rate and rhythm  You may need more than one medicine to treat your symptoms  · Blood thinners    help prevent blood clots  Examples of blood thinners include heparin and warfarin  Clots can cause strokes, heart attacks, and death  The following are general safety guidelines to follow while you are taking a blood thinner:    ¨ Watch for bleeding and bruising while you take blood thinners  Watch for bleeding from your gums or nose  Watch for blood in your urine and bowel movements  Use a soft washcloth on your skin, and a soft toothbrush to brush your teeth  This can keep your skin and gums from bleeding  If you shave, use an electric shaver  Do not play contact sports  ¨ Tell your dentist and other healthcare providers that you take anticoagulants  Wear a bracelet or necklace that says you take this medicine  ¨ Do not start or stop any medicines unless your healthcare provider tells you to  Many medicines cannot be used with blood thinners  ¨ Tell your healthcare provider right away if you forget to take the medicine, or if you take too much  ¨ Warfarin  is a blood thinner that you may need to take  The following are things you should be aware of if you take warfarin  § Foods and medicines can affect the amount of warfarin in your blood  Do not make major changes to your diet while you take warfarin  Warfarin works best when you eat about the same amount of vitamin K every day  Vitamin K is found in green leafy vegetables and certain other foods  Ask for more information about what to eat when you are taking warfarin  § You will need to see your healthcare provider for follow-up visits when you are on warfarin  You will need regular blood tests  These tests are used to decide how much medicine you need  · Antiplatelets , such as aspirin, help prevent blood clots  Take your antiplatelet medicine exactly as directed  These medicines make it more likely for you to bleed or bruise  If you are told to take aspirin, do not take acetaminophen or ibuprofen instead  · Take your medicine as directed  Contact your healthcare provider if you think your medicine is not helping or if you have side effects  Tell him or her if you are allergic to any medicine  Keep a list of the medicines, vitamins, and herbs you take  Include the amounts, and when and why you take them  Bring the list or the pill bottles to follow-up visits  Carry your medicine list with you in case of an emergency  Follow up with your cardiologist as directed: You will need regular blood tests and monitoring  Write down your questions so you remember to ask them during your visits  Manage A-fib:   · Know your target heart rate  Learn how to take your pulse and monitor your heart rate  · Manage other health conditions    This includes high blood pressure, sleep apnea, thyroid disease, diabetes, and other heart conditions  Take medicine as directed and follow your treatment plan  · Limit or do not drink alcohol  Alcohol can make a-fib hard to manage  Ask your healthcare provider if it is safe for you to drink alcohol  A drink of alcohol is 12 ounces of beer, 5 ounces of wine, or 1½ ounces of liquor  · Do not smoke  Nicotine and other chemicals in cigarettes and cigars can cause heart and lung damage  Ask your healthcare provider for information if you currently smoke and need help to quit  E-cigarettes or smokeless tobacco still contain nicotine  Talk to your healthcare provider before you use these products  · Eat heart-healthy foods  Heart healthy foods will help keep your cholesterol low  These include fruits, vegetables, whole-grain breads, low-fat dairy products, beans, lean meats, and fish  Replace butter and margarine with heart-healthy oils such as olive oil and canola oil  · Maintain a healthy weight  Ask your healthcare provider how much you should weigh  Ask him to help you create a weight loss plan if you are overweight  · Exercise for 30 minutes  most days of the week  Ask your healthcare provider about the best exercise plan for you  © 2017 [address] [employer]  Information is for End User's use only and may not be sold, redistributed or otherwise used for commercial purposes  All illustrations and images included in CareNotes® are the copyrighted property of [employer]  or Tip Marquez  The above information is an  only  It is not intended as medical advice for individual conditions or treatments  Talk to your doctor, nurse or pharmacist before following any medical regimen to see if it is safe and effective for you

## 2018-02-18 NOTE — ASSESSMENT & PLAN NOTE
· JOHN, POA, suspect secondary to hypotension, AFib with RVR and in the setting of alteral renal hemodynamics due to lisinopril per Nephrology  · Nephrology following  · Torsemide 10 mg daily started on 2/16  · Cr stable at 1 43 today  · Baseline Cr 1 1-1 3  · Continue to monitor off lisinopril per nephrology  · Renal ultrasound: No hydronephrosis or other acute findings to explain acute kidney injury

## 2018-02-21 ENCOUNTER — TELEPHONE (OUTPATIENT)
Dept: CARDIOLOGY CLINIC | Facility: CLINIC | Age: 83
End: 2018-02-21

## 2018-02-22 RX ORDER — CEPHALEXIN 500 MG/1
500 CAPSULE ORAL 2 TIMES DAILY
Refills: 0 | COMMUNITY
Start: 2018-01-26 | End: 2018-02-26 | Stop reason: ALTCHOICE

## 2018-02-26 ENCOUNTER — OFFICE VISIT (OUTPATIENT)
Dept: NEPHROLOGY | Facility: CLINIC | Age: 83
End: 2018-02-26
Payer: MEDICARE

## 2018-02-26 VITALS
DIASTOLIC BLOOD PRESSURE: 64 MMHG | SYSTOLIC BLOOD PRESSURE: 116 MMHG | WEIGHT: 230 LBS | HEART RATE: 80 BPM | BODY MASS INDEX: 38.32 KG/M2 | HEIGHT: 65 IN

## 2018-02-26 DIAGNOSIS — N18.30 STAGE 3 CHRONIC KIDNEY DISEASE (HCC): Primary | Chronic | ICD-10-CM

## 2018-02-26 DIAGNOSIS — N18.30 BENIGN HYPERTENSION WITH CKD (CHRONIC KIDNEY DISEASE) STAGE III (HCC): ICD-10-CM

## 2018-02-26 DIAGNOSIS — I12.9 BENIGN HYPERTENSION WITH CKD (CHRONIC KIDNEY DISEASE) STAGE III (HCC): ICD-10-CM

## 2018-02-26 DIAGNOSIS — D64.9 ANEMIA, UNSPECIFIED TYPE: ICD-10-CM

## 2018-02-26 PROBLEM — R80.8 OTHER PROTEINURIA: Status: ACTIVE | Noted: 2018-02-26

## 2018-02-26 PROCEDURE — 99214 OFFICE O/P EST MOD 30 MIN: CPT | Performed by: PHYSICIAN ASSISTANT

## 2018-02-26 NOTE — PROGRESS NOTES
Assessment and Plan:    Lyons was seen today for follow-up  Diagnoses and all orders for this visit:    Stage 3 chronic kidney disease  -     Basic metabolic panel; Future  -     Basic metabolic panel; Future  -     Magnesium; Future  -     Phosphorus; Future  -     Protein / creatinine ratio, urine; Future  -     Urinalysis with microscopic; Future    Benign hypertension with CKD (chronic kidney disease) stage III    Anemia, unspecified type  -     CBC; Future  -     Iron Saturation %; Future  -     Ferritin; Future       Chronic Kidney Disease stage 3- Baseline creatinine is around 1 3  Etiology likely due to cardiorenal syndrome and hypertensive nephrosclerosis  Hypertension- Antihypertensive regimen includes metoprolol 100mg daily and torsemide 10mg daily  Avoid salt in your diet  Exercise as able  Avoid NSAIDs (Advil, Aleve, Ibuprofen, Motrin, Celebrex, Naproxen)  BP acceptable but would not want it to be any lower  Proteinuria/Hematuria- Repeat UA  Anemia- check iron studies  Follow up with Dr Armani Wright in 3 months  Please call the office with any questions or concerns  Reason for Visit: Follow-up [facility] follow up)    HPI: Pilar Watters is a 80 y o  male who is here for follow up after hospitalization at [address] [place] [address] about 2 weeks ago  He was found to have acute kidney injury due to pre renal/ATN with hypotension and atrial fibrillation with RVR and cardiorenal in the setting of altered renal hemodynamics due to lisinopril  Creatinine peaked at 4 58 and was 1 43 on discharge  His only complaint is a chronic itch  He denies any new soaps, detergents, changes in his diet, or rash  ROS: A complete review of systems was performed and was negative unless otherwise noted in the history of present illness  Allergies:   Patient has no known allergies      Medications:     Current Outpatient Prescriptions:     apixaban (ELIQUIS) 2 5 mg, Take 1 tablet (2 5 mg total) by mouth 2 (two) times a day, Disp: 60 tablet, Rfl: 0    aspirin (ECOTRIN LOW STRENGTH) 81 mg EC tablet, Take by mouth, Disp: , Rfl:     escitalopram (LEXAPRO) 10 mg tablet, Take 1 tablet (10 mg total) by mouth daily, Disp: 30 tablet, Rfl: 0    loratadine (CLARITIN) 10 mg tablet, Take by mouth, Disp: , Rfl:     metoprolol tartrate (LOPRESSOR) 100 mg tablet, Take 1 tablet (100 mg total) by mouth every 12 (twelve) hours, Disp: 60 tablet, Rfl: 0    oxybutynin (DITROPAN) 5 mg tablet, Take 1 tablet by mouth 2 (two) times a day, Disp: , Rfl:     pantoprazole (PROTONIX) 40 mg tablet, Take 1 tablet (40 mg total) by mouth daily in the early morning, Disp: 30 tablet, Rfl: 0    sucralfate (CARAFATE) 1 g/10 mL suspension, Take 10 mL (1,000 mg total) by mouth every 6 (six) hours, Disp: 420 mL, Rfl: 0    torsemide (DEMADEX) 10 mg tablet, Take 1 tablet (10 mg total) by mouth daily, Disp: 30 tablet, Rfl: 0    Past Medical History:   Diagnosis Date    Atrial fibrillation (HCC)     Hypertension     Macular degeneration      Past Surgical History:   Procedure Laterality Date    ESOPHAGOGASTRODUODENOSCOPY N/A 2/15/2018    Procedure: ESOPHAGOGASTRODUODENOSCOPY (EGD); Surgeon: Elaine Roberts MD;  Location: [facility];  Service: Gastroenterology     Family History   Problem Relation Age of Onset    No Known Problems Mother       reports that he has quit smoking  He has never used smokeless tobacco  He reports that he does not drink alcohol or use drugs  Physical Exam:   Vitals:    02/26/18 1527 02/26/18 1545   BP:  116/64   BP Location:  Right arm   Patient Position:  Sitting   Cuff Size:  Large   Pulse:  80   Weight: 104 kg (230 lb)    Height: 5' 5" (1 651 m)      Body mass index is 38 27 kg/m²  General: NAD  Neuro: AAO  Neck: supple  Skin: no rash  Heart: RRR  Lungs: CTAB  Abdomen: soft, nt, nd  Extremities: no edema    Procedure:  No results found for this or any previous visit  Labs reviewed      Lab Results Component Value Date    GLUCOSE 100 02/18/2018    CALCIUM 8 3 02/18/2018     02/18/2018    K 4 3 02/18/2018    CO2 25 02/18/2018     02/18/2018    BUN 27 (H) 02/18/2018    CREATININE 1 43 (H) 02/18/2018

## 2018-02-26 NOTE — LETTER
February 26, 2018     [address] [address]    Patient: Rigoberto Joseph   YOB: 1935   Date of Visit: 2/26/2018       Dear Dr Darrell Barrientos:    Thank you for referring Nitesh Gonzalez to me for evaluation  Below are my notes for this consultation  If you have questions, please do not hesitate to call me  I look forward to following your patient along with you  Sincerely,        Milton Whitaker PA-C        CC: No Recipients  Philip Gilman  2/26/2018  3:53 PM  Sign at close encounter  Assessment and Plan:    Malcolm Joseph was seen today for follow-up  Diagnoses and all orders for this visit:    Stage 3 chronic kidney disease  -     Basic metabolic panel; Future  -     Basic metabolic panel; Future  -     Magnesium; Future  -     Phosphorus; Future  -     Protein / creatinine ratio, urine; Future  -     Urinalysis with microscopic; Future    Benign hypertension with CKD (chronic kidney disease) stage III    Anemia, unspecified type  -     CBC; Future  -     Iron Saturation %; Future  -     Ferritin; Future       Chronic Kidney Disease stage 3- Baseline creatinine is around 1 3  Etiology likely due to cardiorenal syndrome and hypertensive nephrosclerosis  Hypertension- Antihypertensive regimen includes metoprolol 100mg daily and torsemide 10mg daily  Avoid salt in your diet  Exercise as able  Avoid NSAIDs (Advil, Aleve, Ibuprofen, Motrin, Celebrex, Naproxen)  BP acceptable but would not want it to be any lower  Proteinuria/Hematuria- Repeat UA  Anemia- check iron studies  Follow up with Dr Dominique Agrawal in 3 months  Please call the office with any questions or concerns  Reason for Visit: Follow-up [facility] follow up)    HPI: Rigoberto Joseph is a 80 y o  male who is here for follow up after hospitalization at [address] [place] [address] about 2 weeks ago    He was found to have acute kidney injury due to pre renal/ATN with hypotension and atrial fibrillation with RVR and cardiorenal in the setting of altered renal hemodynamics due to lisinopril  Creatinine peaked at 4 58 and was 1 43 on discharge  His only complaint is a chronic itch  He denies any new soaps, detergents, changes in his diet, or rash  ROS: A complete review of systems was performed and was negative unless otherwise noted in the history of present illness  Allergies:   Patient has no known allergies  Medications:     Current Outpatient Prescriptions:     apixaban (ELIQUIS) 2 5 mg, Take 1 tablet (2 5 mg total) by mouth 2 (two) times a day, Disp: 60 tablet, Rfl: 0    aspirin (ECOTRIN LOW STRENGTH) 81 mg EC tablet, Take by mouth, Disp: , Rfl:     escitalopram (LEXAPRO) 10 mg tablet, Take 1 tablet (10 mg total) by mouth daily, Disp: 30 tablet, Rfl: 0    loratadine (CLARITIN) 10 mg tablet, Take by mouth, Disp: , Rfl:     metoprolol tartrate (LOPRESSOR) 100 mg tablet, Take 1 tablet (100 mg total) by mouth every 12 (twelve) hours, Disp: 60 tablet, Rfl: 0    oxybutynin (DITROPAN) 5 mg tablet, Take 1 tablet by mouth 2 (two) times a day, Disp: , Rfl:     pantoprazole (PROTONIX) 40 mg tablet, Take 1 tablet (40 mg total) by mouth daily in the early morning, Disp: 30 tablet, Rfl: 0    sucralfate (CARAFATE) 1 g/10 mL suspension, Take 10 mL (1,000 mg total) by mouth every 6 (six) hours, Disp: 420 mL, Rfl: 0    torsemide (DEMADEX) 10 mg tablet, Take 1 tablet (10 mg total) by mouth daily, Disp: 30 tablet, Rfl: 0    Past Medical History:   Diagnosis Date    Atrial fibrillation (HCC)     Hypertension     Macular degeneration      Past Surgical History:   Procedure Laterality Date    ESOPHAGOGASTRODUODENOSCOPY N/A 2/15/2018    Procedure: ESOPHAGOGASTRODUODENOSCOPY (EGD); Surgeon: Therese Morales MD;  [facility];  Service: Gastroenterology     Family History   Problem Relation Age of Onset    No Known Problems Mother       reports that he has quit smoking   He has never used smokeless tobacco  He reports that he does not drink alcohol or use drugs  Physical Exam:   Vitals:    02/26/18 1527 02/26/18 1545   BP:  116/64   BP Location:  Right arm   Patient Position:  Sitting   Cuff Size:  Large   Pulse:  80   Weight: 104 kg (230 lb)    Height: 5' 5" (1 651 m)      Body mass index is 38 27 kg/m²  General: NAD  Neuro: AAO  Neck: supple  Skin: no rash  Heart: RRR  Lungs: CTAB  Abdomen: soft, nt, nd  Extremities: no edema    Procedure:  No results found for this or any previous visit  Labs reviewed      Lab Results   Component Value Date    GLUCOSE 100 02/18/2018    CALCIUM 8 3 02/18/2018     02/18/2018    K 4 3 02/18/2018    CO2 25 02/18/2018     02/18/2018    BUN 27 (H) 02/18/2018    CREATININE 1 43 (H) 02/18/2018

## 2018-02-26 NOTE — PATIENT INSTRUCTIONS
Chronic Kidney Disease stage 3- Baseline creatinine is around 1 3  Etiology likely due to cardiorenal syndrome and hypertensive nephrosclerosis  Hypertension- Antihypertensive regimen includes metoprolol 100mg daily and torsemide 10mg daily  Avoid salt in your diet  Exercise as able  Avoid NSAIDs (Advil, Aleve, Ibuprofen, Motrin, Celebrex, Naproxen)  BP acceptable but would not want it to be any lower  Proteinuria/Hematuria- Repeat UA  Anemia- check iron studies  Follow up with Dr Kaushik García in 3 months  Please call the office with any questions or concerns  Chronic Kidney Disease   AMBULATORY CARE:   Chronic kidney disease (CKD)  is the gradual and permanent loss of kidney function  Normally, the kidneys remove fluid, chemicals, and waste from your blood  These wastes are turned into urine by your kidneys  CKD may worsen over time and lead to kidney failure  Common symptoms include the following:   · Changes in how often you need to urinate    · Swelling in your arms, legs, or feet    · Shortness of breath    · Fatigue or weakness    · Bad or bitter taste in your mouth    · Nausea, vomiting, or loss of appetite  Seek care immediately if:   · You are confused and very drowsy  · You have a seizure  · You have shortness of breath  Contact your healthcare provider if:   · You suddenly gain or lose more weight than your healthcare provider has told you is okay  · You have itchy skin or a rash  · You urinate more or less than you normally do  · You have blood in your urine  · You have nausea and repeated vomiting  · You have fatigue or muscle weakness  · You have hiccups that will not stop  · You have questions or concerns about your condition or care  Treatment for CKD:  Medicines may be given to decrease blood pressure and get rid of extra fluid  You may also receive medicine to manage health conditions that may occur with CKD   Dialysis is a treatment to remove chemicals and waste from your blood when your kidneys can no longer do this  Surgery may be needed to create an arteriovenous fistula (AVF) in your arm or insert a catheter into your abdomen so that you can receive dialysis  A kidney transplant may be done if your CKD becomes severe  Manage CKD:   · Maintain a healthy weight  Ask your healthcare provider how much you should weigh  Ask him to help you create a weight loss plan if you are overweight  · Exercise 30 to 60 minutes a day, 4 to 7 times a week, or as directed  Ask about the best exercise plan for you  Regular exercise can help you manage CKD, high blood pressure, and diabetes  · Follow your healthcare provider's advice about what to eat and drink  He may tell you to eat food low in sodium (salt), potassium, phosphorus, or protein  You may need to see a dietitian if you need help planning meals  Ask how much liquid to drink each day and which liquids are best for you  · Limit alcohol  Ask how much alcohol is safe for you to drink  A drink of alcohol is 12 ounces of beer, 5 ounces of wine, or 1½ ounces of liquor  · Do not smoke  Nicotine and other chemicals in cigarettes and cigars can cause lung and kidney damage  Ask your healthcare provider for information if you currently smoke and need help to quit  E-cigarettes or smokeless tobacco still contain nicotine  Talk to your healthcare provider before you use these products  · Ask your healthcare provider if you need vaccines  Infections such as pneumonia, influenza, and hepatitis can be more harmful or more likely to occur in a person who has CKD  Vaccines reduce your risk of infection with these viruses  Follow up with your healthcare provider as directed:  Write down your questions so you remember to ask them during your visits     © 2017 [employer] Information is for End User's use only and may not be sold, redistributed or otherwise used for commercial purposes  All illustrations and images included in CareNotes® are the copyrighted property of Margie POZO  or [facility]  The above information is an  only  It is not intended as medical advice for individual conditions or treatments  Talk to your doctor, nurse or pharmacist before following any medical regimen to see if it is safe and effective for you

## 2018-02-27 ENCOUNTER — TRANSCRIBE ORDERS (OUTPATIENT)
Dept: ADMINISTRATIVE | Facility: HOSPITAL | Age: 83
End: 2018-02-27

## 2018-02-27 ENCOUNTER — APPOINTMENT (OUTPATIENT)
Dept: LAB | Facility: HOSPITAL | Age: 83
End: 2018-02-27
Attending: INTERNAL MEDICINE
Payer: MEDICARE

## 2018-02-27 DIAGNOSIS — N18.30 CHRONIC KIDNEY DISEASE, STAGE III (MODERATE) (HCC): Primary | ICD-10-CM

## 2018-02-27 DIAGNOSIS — N18.30 CHRONIC KIDNEY DISEASE, STAGE III (MODERATE) (HCC): ICD-10-CM

## 2018-02-27 LAB
ANION GAP SERPL CALCULATED.3IONS-SCNC: 7 MMOL/L (ref 4–13)
BUN SERPL-MCNC: 24 MG/DL (ref 5–25)
CALCIUM SERPL-MCNC: 9 MG/DL (ref 8.3–10.1)
CHLORIDE SERPL-SCNC: 101 MMOL/L (ref 100–108)
CO2 SERPL-SCNC: 31 MMOL/L (ref 21–32)
CREAT SERPL-MCNC: 1.42 MG/DL (ref 0.6–1.3)
GFR SERPL CREATININE-BSD FRML MDRD: 46 ML/MIN/1.73SQ M
GLUCOSE SERPL-MCNC: 115 MG/DL (ref 65–140)
POTASSIUM SERPL-SCNC: 4.5 MMOL/L (ref 3.5–5.3)
SODIUM SERPL-SCNC: 139 MMOL/L (ref 136–145)

## 2018-02-27 PROCEDURE — 36415 COLL VENOUS BLD VENIPUNCTURE: CPT

## 2018-02-27 PROCEDURE — 80048 BASIC METABOLIC PNL TOTAL CA: CPT

## 2018-03-06 ENCOUNTER — TELEPHONE (OUTPATIENT)
Dept: GASTROENTEROLOGY | Facility: AMBULARY SURGERY CENTER | Age: 83
End: 2018-03-06

## 2018-03-07 NOTE — PROGRESS NOTES
Message  [facility] Program Patient Update: This patient was discussed at the Interdisciplinary Team (IDT) meeting held on  Home visits by the [facility] nurse continue  Interventions taken include medication changes   Weight is up 9 pounds and patient is c/o increasing SOB and abdominal distention with minimal LE edema  Discussed status with Dr Alfred Hoyt and will increase Lasix to 40 mg BID for one week  Daughter Army Lazar was informed and home health nurse Veronica Angela was also informed     Contact: HERIBERTO Pérez , [facility] Nurse    Phone: [phone]      Signatures   Electronically signed by : Aranza Figueroa, ; Apr 29 2016 12:59PM EST                       (Author)

## 2018-04-03 ENCOUNTER — OFFICE VISIT (OUTPATIENT)
Dept: GASTROENTEROLOGY | Facility: AMBULARY SURGERY CENTER | Age: 83
End: 2018-04-03
Payer: MEDICARE

## 2018-04-03 VITALS
BODY MASS INDEX: 33.5 KG/M2 | HEART RATE: 108 BPM | TEMPERATURE: 97.6 F | SYSTOLIC BLOOD PRESSURE: 122 MMHG | HEIGHT: 70 IN | WEIGHT: 234 LBS | DIASTOLIC BLOOD PRESSURE: 66 MMHG

## 2018-04-03 DIAGNOSIS — R13.10 DYSPHAGIA, UNSPECIFIED TYPE: ICD-10-CM

## 2018-04-03 DIAGNOSIS — K26.9 DUODENAL ULCER: Primary | ICD-10-CM

## 2018-04-03 PROCEDURE — 99213 OFFICE O/P EST LOW 20 MIN: CPT | Performed by: PHYSICIAN ASSISTANT

## 2018-04-03 RX ORDER — DIGOXIN 125 MCG
125 TABLET ORAL DAILY
Refills: 3 | COMMUNITY
Start: 2018-02-23 | End: 2018-10-29 | Stop reason: SDUPTHER

## 2018-04-03 RX ORDER — PANTOPRAZOLE SODIUM 40 MG/1
40 TABLET, DELAYED RELEASE ORAL
Qty: 90 TABLET | Refills: 2 | Status: SHIPPED | OUTPATIENT
Start: 2018-04-03 | End: 2018-10-29 | Stop reason: ALTCHOICE

## 2018-04-03 NOTE — LETTER
April 3, 2018     Agu[place] Cha [address] [address]    Patient: Walker Lee   YOB: 1935   Date of Visit: 4/3/2018       Dear Dr Hannah Finnegan:    Today I saw Magaly Cortez for follow up  Below are my notes for this visit  If you have questions, please do not hesitate to call me  I look forward to following your patient along with you  Sincerely,        Fred eLon PA-C        CC: No Recipients    Assessment and Plan    #1  History food impaction with dysphagia: likely secondary to esophageal dysmotility  Has been doing well since hospitalization without any issues  -Continue protonix daily  -Soft foods, chew well, eat slow  -If patient has any further issues with swallowing, he is to call  At that point can consider a barium swallow and/or repeat EGD    #2  Duodenal ulcer with bleeding s/p epinephrine injection and cautery: during EGD in February 2018  No signs of melena or bleeding currently  Denies any signs of symptomatic anemia    -Continue protonix 40 mg once daily  -Monitor for any signs of bleeding such as black stool or blood in stool   -Monitor for signs of symptomatic anemia    --------------------------------------------------------------------------------------------------------------------    Chief Complaint: hospital follow up for food impaction and duodenal ulcer with bleeding    HPI: Walker Lee is a 80 y o  male who presents today for follow up for food impaction and bleeding duodenal ulcers  Patient had EGD in February 2018 during hospitalization after he had issues with food getting stuck in his esophagus  He had issues with swallowing in the past but never had food get stuck  He underwent EGD in which he was disimpaction and noted to have underlying esophageal ulcer  He also had multiple duodenal ulcers that were actively bleeding and controlled with epi injection and cautery  He denies any issues with swallowing now   He is taking Protonix once daily and has completed the carafate  Patient denies any nausea, vomiting, abdominal pain, unexpected weight loss, diarrhea, constipation, blood in stool, or black tarry stools  Review of Systems:   General: negative for fatigue, fever, night sweats or unexpected weight loss  Psychological: negative for anxiety or depression  Ophthalmic: negative for blurry vision or scleral icterus  ENT: negative for headaches, oral lesions, sore throat, vocal changes or dysphagia  Hematological and Lymphatic: negative for pallor or swollen lymph nodes  Respiratory: negative for cough, shortness of breath or wheezing  Cardiovascular: negative for chest pain, edema or murmur  Gastrointestinal: as mentioned in HPI  Genito-Urinary: negative for dysuria or incontinence  Musculoskeletal: negative for joint pain, joint stiffness or joint swelling  Dermatological: negative for pruritus, rash, or jaundice    Current Medications  Current Outpatient Prescriptions   Medication Sig Dispense Refill    apixaban (ELIQUIS) 2 5 mg Take 1 tablet (2 5 mg total) by mouth 2 (two) times a day 60 tablet 0    aspirin (ECOTRIN LOW STRENGTH) 81 mg EC tablet Take by mouth      digoxin (LANOXIN) 0 125 mg tablet Take 125 mcg by mouth daily  3    escitalopram (LEXAPRO) 10 mg tablet Take 1 tablet (10 mg total) by mouth daily 30 tablet 0    loratadine (CLARITIN) 10 mg tablet Take by mouth      metoprolol tartrate (LOPRESSOR) 100 mg tablet Take 1 tablet (100 mg total) by mouth every 12 (twelve) hours 60 tablet 0    oxybutynin (DITROPAN) 5 mg tablet Take 1 tablet by mouth 2 (two) times a day      pantoprazole (PROTONIX) 40 mg tablet Take 1 tablet (40 mg total) by mouth daily in the early morning 90 tablet 2    torsemide (DEMADEX) 10 mg tablet Take 1 tablet (10 mg total) by mouth daily 30 tablet 0     No current facility-administered medications for this visit          Past Medical History  Past Medical History:   Diagnosis Date    Atrial fibrillation ([address] )     Hypertension     Macular degeneration        Past Surgical History  Past Surgical History:   Procedure Laterality Date    ESOPHAGOGASTRODUODENOSCOPY N/A 2/15/2018    Procedure: ESOPHAGOGASTRODUODENOSCOPY (EGD); Surgeon: Gilmer Mcintosh MD;  [facility];  Service: Gastroenterology       Past Social History   Social History     Social History    Marital status:       Spouse name: N/A    Number of children: N/A    Years of education: N/A     Social History Main Topics    Smoking status: Former Smoker    Smokeless tobacco: Never Used    Alcohol use No    Drug use: No    Sexual activity: Not Asked     Other Topics Concern    None     Social History Narrative    None       The following portions of the patient's history were reviewed and updated as appropriate: allergies, current medications, past family history, past medical history, past social history, past surgical history and problem list     Vital Signs  Vitals:    04/03/18 1018   BP: 122/66   BP Location: Left arm   Patient Position: Sitting   Cuff Size: Large   Pulse: (!) 108   Temp: 97 6 °F (36 4 °C)   TempSrc: Tympanic   Weight: 106 kg (234 lb)   Height: 5' 10" (1 778 m)       Physical Exam:  General appearance: alert, cooperative, no distress  HEENT: normocephalic, anicteric, no eye erythema or discharge, no oropharyngeal thrush  Neck: supple, trachea midline, no adenopathy  Lungs: CTA b/l, no rales, rhonchi, or wheezing, unlabored respirations  Heart: irregularly irregular, no murmur, rubs, or gallops  Abdomen: soft, non-tender, non-distended, normal bowel sounds, no masses or organomegaly  Rectal: deferred  Extremities: no cyanosis, clubbing, or edema  Musculoskeletal: normal gait  Skin: color and texture normal, no jaundice, no rashes or lesions  Psychiatric: alert and oriented, normal affect and behavior

## 2018-04-03 NOTE — PROGRESS NOTES
Assessment and Plan    #1  History food impaction with dysphagia: likely secondary to esophageal dysmotility  Has been doing well since hospitalization without any issues  -Continue protonix daily  -Soft foods, chew well, eat slow  -If patient has any further issues with swallowing, he is to call  At that point can consider a barium swallow and/or repeat EGD    #2  Duodenal ulcer with bleeding s/p epinephrine injection and cautery: during EGD in February 2018  No signs of melena or bleeding currently  Denies any signs of symptomatic anemia    -Continue protonix 40 mg once daily  -Monitor for any signs of bleeding such as black stool or blood in stool   -Monitor for signs of symptomatic anemia    --------------------------------------------------------------------------------------------------------------------    Chief Complaint: hospital follow up for food impaction and duodenal ulcer with bleeding    HPI: Terra Zuluaga is a 80 y o  male who presents today for follow up for food impaction and bleeding duodenal ulcers  Patient had EGD in February 2018 during hospitalization after he had issues with food getting stuck in his esophagus  He had issues with swallowing in the past but never had food get stuck  He underwent EGD in which he was disimpaction and noted to have underlying esophageal ulcer  He also had multiple duodenal ulcers that were actively bleeding and controlled with epi injection and cautery  He denies any issues with swallowing now  He is taking Protonix once daily and has completed the carafate  Patient denies any nausea, vomiting, abdominal pain, unexpected weight loss, diarrhea, constipation, blood in stool, or black tarry stools         Review of Systems:   General: negative for fatigue, fever, night sweats or unexpected weight loss  Psychological: negative for anxiety or depression  Ophthalmic: negative for blurry vision or scleral icterus  ENT: negative for headaches, oral lesions, sore throat, vocal changes or dysphagia  Hematological and Lymphatic: negative for pallor or swollen lymph nodes  Respiratory: negative for cough, shortness of breath or wheezing  Cardiovascular: negative for chest pain, edema or murmur  Gastrointestinal: as mentioned in HPI  Genito-Urinary: negative for dysuria or incontinence  Musculoskeletal: negative for joint pain, joint stiffness or joint swelling  Dermatological: negative for pruritus, rash, or jaundice    Current Medications  Current Outpatient Prescriptions   Medication Sig Dispense Refill    apixaban (ELIQUIS) 2 5 mg Take 1 tablet (2 5 mg total) by mouth 2 (two) times a day 60 tablet 0    aspirin (ECOTRIN LOW STRENGTH) 81 mg EC tablet Take by mouth      digoxin (LANOXIN) 0 125 mg tablet Take 125 mcg by mouth daily  3    escitalopram (LEXAPRO) 10 mg tablet Take 1 tablet (10 mg total) by mouth daily 30 tablet 0    loratadine (CLARITIN) 10 mg tablet Take by mouth      metoprolol tartrate (LOPRESSOR) 100 mg tablet Take 1 tablet (100 mg total) by mouth every 12 (twelve) hours 60 tablet 0    oxybutynin (DITROPAN) 5 mg tablet Take 1 tablet by mouth 2 (two) times a day      pantoprazole (PROTONIX) 40 mg tablet Take 1 tablet (40 mg total) by mouth daily in the early morning 90 tablet 2    torsemide (DEMADEX) 10 mg tablet Take 1 tablet (10 mg total) by mouth daily 30 tablet 0     No current facility-administered medications for this visit  Past Medical History  Past Medical History:   Diagnosis Date    Atrial fibrillation ([facility] [address] )     Hypertension     Macular degeneration        Past Surgical History  Past Surgical History:   Procedure Laterality Date    ESOPHAGOGASTRODUODENOSCOPY N/A 2/15/2018    Procedure: ESOPHAGOGASTRODUODENOSCOPY (EGD); Surgeon: Samia Bush MD;  [facility];  Service: Gastroenterology       Past Social History   Social History     Social History    Marital status:       Spouse name: N/A    Number of children: N/A    Years of education: N/A     Social History Main Topics    Smoking status: Former Smoker    Smokeless tobacco: Never Used    Alcohol use No    Drug use: No    Sexual activity: Not Asked     Other Topics Concern    None     Social History Narrative    None       The following portions of the patient's history were reviewed and updated as appropriate: allergies, current medications, past family history, past medical history, past social history, past surgical history and problem list     Vital Signs  Vitals:    04/03/18 1018   BP: 122/66   BP Location: Left arm   Patient Position: Sitting   Cuff Size: Large   Pulse: (!) 108   Temp: 97 6 °F (36 4 °C)   TempSrc: Tympanic   Weight: 106 kg (234 lb)   Height: 5' 10" (1 778 m)       Physical Exam:  General appearance: alert, cooperative, no distress  HEENT: normocephalic, anicteric, no eye erythema or discharge, no oropharyngeal thrush  Neck: supple, trachea midline, no adenopathy  Lungs: CTA b/l, no rales, rhonchi, or wheezing, unlabored respirations  Heart: irregularly irregular, no murmur, rubs, or gallops  Abdomen: soft, non-tender, non-distended, normal bowel sounds, no masses or organomegaly  Rectal: deferred  Extremities: no cyanosis, clubbing, or edema  Musculoskeletal: normal gait  Skin: color and texture normal, no jaundice, no rashes or lesions  Psychiatric: alert and oriented, normal affect and behavior

## 2018-04-26 ENCOUNTER — OFFICE VISIT (OUTPATIENT)
Dept: CARDIOLOGY CLINIC | Facility: CLINIC | Age: 83
End: 2018-04-26
Payer: MEDICARE

## 2018-04-26 VITALS
WEIGHT: 234.1 LBS | OXYGEN SATURATION: 96 % | HEART RATE: 85 BPM | SYSTOLIC BLOOD PRESSURE: 114 MMHG | HEIGHT: 71 IN | DIASTOLIC BLOOD PRESSURE: 62 MMHG | BODY MASS INDEX: 32.77 KG/M2

## 2018-04-26 DIAGNOSIS — I50.22 CHRONIC SYSTOLIC CHF (CONGESTIVE HEART FAILURE) (HCC): Chronic | ICD-10-CM

## 2018-04-26 DIAGNOSIS — I48.91 ATRIAL FIBRILLATION, UNSPECIFIED TYPE (HCC): Primary | ICD-10-CM

## 2018-04-26 DIAGNOSIS — I12.9 BENIGN HYPERTENSION WITH CKD (CHRONIC KIDNEY DISEASE) STAGE III (HCC): ICD-10-CM

## 2018-04-26 DIAGNOSIS — N18.30 BENIGN HYPERTENSION WITH CKD (CHRONIC KIDNEY DISEASE) STAGE III (HCC): ICD-10-CM

## 2018-04-26 DIAGNOSIS — I42.8 NICM (NONISCHEMIC CARDIOMYOPATHY) (HCC): ICD-10-CM

## 2018-04-26 PROBLEM — I10 ESSENTIAL HYPERTENSION: Status: ACTIVE | Noted: 2018-04-26

## 2018-04-26 PROCEDURE — 99214 OFFICE O/P EST MOD 30 MIN: CPT | Performed by: INTERNAL MEDICINE

## 2018-04-26 PROCEDURE — 93000 ELECTROCARDIOGRAM COMPLETE: CPT | Performed by: INTERNAL MEDICINE

## 2018-04-26 RX ORDER — LISINOPRIL 2.5 MG/1
2.5 TABLET ORAL DAILY
Refills: 3 | COMMUNITY
Start: 2018-03-31

## 2018-04-26 NOTE — PROGRESS NOTES
Cardiology Follow Up    Isaura Cordero  [birth year]  [id number]  [facility] [address] [facility] [place] [id number]    1  Atrial fibrillation, unspecified type ([facility] )  POCT ECG   2  Chronic systolic CHF (congestive heart failure) ([facility] )     3  NICM (nonischemic cardiomyopathy) ([place] [address] )     4  Benign hypertension with CKD (chronic kidney disease) stage III         Interval History: Patient feels well, no complaints  Had a recent hospitalization for GI issues - heart stayed stable other than rapid afib intermittently  Patient Active Problem List   Diagnosis    Benign hypertension with CKD (chronic kidney disease) stage III    Atrial fibrillation (HCC)    Chronic systolic CHF (congestive heart failure) (HCC)    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    Depression    Staph aureus infection of the left lower extremity    Stage 3 chronic kidney disease    Azotemia    Dysphagia    Arthritis    Legally blind    Peripheral neuropathy    NICM (nonischemic cardiomyopathy) ([place] [address] )    Other proteinuria    Anemia    Essential hypertension     Past Medical History:   Diagnosis Date    Atrial fibrillation ([place] [address] )     Hypertension     Macular degeneration      Social History     Social History    Marital status:      Spouse name: N/A    Number of children: N/A    Years of education: N/A     Occupational History    Not on file       Social History Main Topics    Smoking status: Former Smoker    Smokeless tobacco: Never Used    Alcohol use No    Drug use: No    Sexual activity: Not on file     Other Topics Concern    Not on file     Social History Narrative    No narrative on file      Family History   Problem Relation Age of Onset    No Known Problems Mother      Past Surgical History:   Procedure Laterality Date    ESOPHAGOGASTRODUODENOSCOPY N/A 2/15/2018    Procedure: ESOPHAGOGASTRODUODENOSCOPY (EGD); Surgeon: Ignacio Darling MD;  [facility];  Service: Gastroenterology       Current Outpatient Prescriptions:     apixaban (ELIQUIS) 2 5 mg, Take 1 tablet (2 5 mg total) by mouth 2 (two) times a day, Disp: 60 tablet, Rfl: 0    aspirin (ECOTRIN LOW STRENGTH) 81 mg EC tablet, Take by mouth, Disp: , Rfl:     digoxin (LANOXIN) 0 125 mg tablet, Take 125 mcg by mouth daily, Disp: , Rfl: 3    escitalopram (LEXAPRO) 10 mg tablet, Take 1 tablet (10 mg total) by mouth daily, Disp: 30 tablet, Rfl: 0    lisinopril (ZESTRIL) 2 5 mg tablet, Take 2 5 mg by mouth daily, Disp: , Rfl: 3    loratadine (CLARITIN) 10 mg tablet, Take by mouth, Disp: , Rfl:     metoprolol tartrate (LOPRESSOR) 100 mg tablet, Take 1 tablet (100 mg total) by mouth every 12 (twelve) hours, Disp: 60 tablet, Rfl: 0    oxybutynin (DITROPAN) 5 mg tablet, Take 1 tablet by mouth 2 (two) times a day, Disp: , Rfl:     pantoprazole (PROTONIX) 40 mg tablet, Take 1 tablet (40 mg total) by mouth daily in the early morning, Disp: 90 tablet, Rfl: 2    torsemide (DEMADEX) 10 mg tablet, Take 1 tablet (10 mg total) by mouth daily, Disp: 30 tablet, Rfl: 0  No Known Allergies    Labs:  Appointment on 02/27/2018   Component Date Value    Sodium 02/27/2018 139     Potassium 02/27/2018 4 5     Chloride 02/27/2018 101     CO2 02/27/2018 31     Anion Gap 02/27/2018 7     BUN 02/27/2018 24     Creatinine 02/27/2018 1 42*    Glucose 02/27/2018 115     Calcium 02/27/2018 9 0     eGFR 02/27/2018 46    Admission on 02/11/2018, Discharged on 02/18/2018   No results displayed because visit has over 200 results  No results found for: CHOL, TRIG, HDL, LDLDIRECT  Imaging: No results found  Review of Systems:  Review of Systems   Constitutional: Negative for activity change, appetite change, fatigue and fever  HENT: Negative for nosebleeds and sore throat  Eyes: Negative for photophobia and visual disturbance  Respiratory: Negative for cough, chest tightness, shortness of breath and wheezing  Cardiovascular: Negative for chest pain, palpitations and leg swelling  Gastrointestinal: Negative for abdominal pain, diarrhea, nausea and vomiting  Endocrine: Negative for polyuria  Genitourinary: Positive for dysuria  Negative for frequency and hematuria  Musculoskeletal: Negative for arthralgias, back pain and gait problem  Skin: Positive for rash  Negative for pallor  Neurological: Negative for dizziness, syncope, speech difficulty and light-headedness  Hematological: Does not bruise/bleed easily  Psychiatric/Behavioral: Negative for agitation, behavioral problems and confusion  Physical Exam:  Physical Exam   Constitutional: He is oriented to person, place, and time  He appears well-developed and well-nourished  No distress  HENT:   Head: Normocephalic and atraumatic  Nose: Nose normal    Eyes: EOM are normal  Pupils are equal, round, and reactive to light  No scleral icterus  Neck: Normal range of motion  Neck supple  No JVD present  Cardiovascular: Normal rate, S1 normal and S2 normal   An irregular rhythm present  Exam reveals no gallop, no S3, no distant heart sounds and no friction rub  No murmur heard  No systolic murmur is present   Pulmonary/Chest: Effort normal and breath sounds normal  No respiratory distress  He has no wheezes  He has no rales  Abdominal: Soft  Bowel sounds are normal  He exhibits no distension  Musculoskeletal: He exhibits no edema or deformity  Neurological: He is alert and oriented to person, place, and time  No cranial nerve deficit  Skin: Skin is warm and dry  He is not diaphoretic  No erythema  Psychiatric: He has a normal mood and affect  His behavior is normal    Vitals reviewed  Blood pressure 114/62, pulse 85, height 5' 10 5" (1 791 m), weight 106 kg (234 lb 1 6 oz), SpO2 96 %        EKG:  Atrial fibrillation  Possible anterior infarct, age undetermined  Abnormal EKG    Discussion/Summary:  Afib: HR well controlled  Continue Toprol XL at current dose along with digoxin 125mcg daily  CHADS2 - 3, continue Eliquis 5mg BID for anticoagulation  EF has improved with good rate control  NICM/Chronic systolic CHF: seems to be related to rate control in afib (tachy-mediated) appears euvolemic, continue maintenance torsemide       HTN: well controlled, monitor on B-blocker and ACE-I

## 2018-04-26 NOTE — PATIENT INSTRUCTIONS

## 2018-06-15 ENCOUNTER — HOSPITAL ENCOUNTER (INPATIENT)
Facility: HOSPITAL | Age: 83
LOS: 3 days | Discharge: HOME HEALTH | DRG: 683 | End: 2018-06-18
Attending: EMERGENCY MEDICINE | Admitting: INTERNAL MEDICINE
Payer: MEDICARE

## 2018-06-15 ENCOUNTER — APPOINTMENT (EMERGENCY)
Dept: CT IMAGING | Facility: HOSPITAL | Age: 83
DRG: 683 | End: 2018-06-15
Payer: MEDICARE

## 2018-06-15 DIAGNOSIS — K59.00 CONSTIPATION: ICD-10-CM

## 2018-06-15 DIAGNOSIS — R31.0 GROSS HEMATURIA: ICD-10-CM

## 2018-06-15 DIAGNOSIS — R33.9 URINARY RETENTION: Primary | ICD-10-CM

## 2018-06-15 LAB
ALBUMIN SERPL BCP-MCNC: 4 G/DL (ref 3.5–5)
ALP SERPL-CCNC: 72 U/L (ref 46–116)
ALT SERPL W P-5'-P-CCNC: 29 U/L (ref 12–78)
ANION GAP SERPL CALCULATED.3IONS-SCNC: 11 MMOL/L (ref 4–13)
APTT PPP: 34 SECONDS (ref 24–36)
AST SERPL W P-5'-P-CCNC: 30 U/L (ref 5–45)
BACTERIA UR QL AUTO: ABNORMAL /HPF
BASOPHILS # BLD AUTO: 0.01 THOUSANDS/ΜL (ref 0–0.1)
BASOPHILS NFR BLD AUTO: 0 % (ref 0–1)
BILIRUB SERPL-MCNC: 0.8 MG/DL (ref 0.2–1)
BILIRUB UR QL STRIP: NEGATIVE
BUN SERPL-MCNC: 30 MG/DL (ref 5–25)
CALCIUM SERPL-MCNC: 8.9 MG/DL (ref 8.3–10.1)
CHLORIDE SERPL-SCNC: 99 MMOL/L (ref 100–108)
CLARITY UR: ABNORMAL
CO2 SERPL-SCNC: 25 MMOL/L (ref 21–32)
COLOR UR: ABNORMAL
CREAT SERPL-MCNC: 1.84 MG/DL (ref 0.6–1.3)
EOSINOPHIL # BLD AUTO: 0.12 THOUSAND/ΜL (ref 0–0.61)
EOSINOPHIL NFR BLD AUTO: 2 % (ref 0–6)
ERYTHROCYTE [DISTWIDTH] IN BLOOD BY AUTOMATED COUNT: 14.3 % (ref 11.6–15.1)
GFR SERPL CREATININE-BSD FRML MDRD: 33 ML/MIN/1.73SQ M
GLUCOSE SERPL-MCNC: 126 MG/DL (ref 65–140)
GLUCOSE UR STRIP-MCNC: NEGATIVE MG/DL
HCT VFR BLD AUTO: 38.1 % (ref 36.5–49.3)
HCT VFR BLD AUTO: 40.5 % (ref 36.5–49.3)
HGB BLD-MCNC: 11.8 G/DL (ref 12–17)
HGB BLD-MCNC: 12.9 G/DL (ref 12–17)
HGB UR QL STRIP.AUTO: ABNORMAL
HOLD SPECIMEN: NORMAL
INR PPP: 1.18 (ref 0.86–1.17)
KETONES UR STRIP-MCNC: NEGATIVE MG/DL
LEUKOCYTE ESTERASE UR QL STRIP: NEGATIVE
LYMPHOCYTES # BLD AUTO: 0.72 THOUSANDS/ΜL (ref 0.6–4.47)
LYMPHOCYTES NFR BLD AUTO: 9 % (ref 14–44)
MCH RBC QN AUTO: 27.3 PG (ref 26.8–34.3)
MCHC RBC AUTO-ENTMCNC: 31.9 G/DL (ref 31.4–37.4)
MCV RBC AUTO: 86 FL (ref 82–98)
MONOCYTES # BLD AUTO: 0.62 THOUSAND/ΜL (ref 0.17–1.22)
MONOCYTES NFR BLD AUTO: 8 % (ref 4–12)
NEUTROPHILS # BLD AUTO: 6.79 THOUSANDS/ΜL (ref 1.85–7.62)
NEUTS SEG NFR BLD AUTO: 82 % (ref 43–75)
NITRITE UR QL STRIP: NEGATIVE
NON-SQ EPI CELLS URNS QL MICRO: ABNORMAL /HPF
PH UR STRIP.AUTO: 6 [PH] (ref 4.5–8)
PLATELET # BLD AUTO: 269 THOUSANDS/UL (ref 149–390)
PMV BLD AUTO: 9.7 FL (ref 8.9–12.7)
POTASSIUM SERPL-SCNC: 4.7 MMOL/L (ref 3.5–5.3)
PROT SERPL-MCNC: 8 G/DL (ref 6.4–8.2)
PROT UR STRIP-MCNC: ABNORMAL MG/DL
PROTHROMBIN TIME: 14.7 SECONDS (ref 11.8–14.2)
RBC # BLD AUTO: 4.73 MILLION/UL (ref 3.88–5.62)
RBC #/AREA URNS AUTO: ABNORMAL /HPF
SODIUM SERPL-SCNC: 135 MMOL/L (ref 136–145)
SP GR UR STRIP.AUTO: 1.02 (ref 1–1.03)
UROBILINOGEN UR QL STRIP.AUTO: 0.2 E.U./DL
WBC # BLD AUTO: 8.26 THOUSAND/UL (ref 4.31–10.16)
WBC #/AREA URNS AUTO: ABNORMAL /HPF

## 2018-06-15 PROCEDURE — 85610 PROTHROMBIN TIME: CPT | Performed by: PHYSICIAN ASSISTANT

## 2018-06-15 PROCEDURE — 36415 COLL VENOUS BLD VENIPUNCTURE: CPT | Performed by: PHYSICIAN ASSISTANT

## 2018-06-15 PROCEDURE — 87086 URINE CULTURE/COLONY COUNT: CPT | Performed by: PHYSICIAN ASSISTANT

## 2018-06-15 PROCEDURE — 85014 HEMATOCRIT: CPT | Performed by: PHYSICIAN ASSISTANT

## 2018-06-15 PROCEDURE — 93005 ELECTROCARDIOGRAM TRACING: CPT

## 2018-06-15 PROCEDURE — 99285 EMERGENCY DEPT VISIT HI MDM: CPT

## 2018-06-15 PROCEDURE — 81001 URINALYSIS AUTO W/SCOPE: CPT

## 2018-06-15 PROCEDURE — 85025 COMPLETE CBC W/AUTO DIFF WBC: CPT | Performed by: PHYSICIAN ASSISTANT

## 2018-06-15 PROCEDURE — 74176 CT ABD & PELVIS W/O CONTRAST: CPT

## 2018-06-15 PROCEDURE — 85730 THROMBOPLASTIN TIME PARTIAL: CPT | Performed by: PHYSICIAN ASSISTANT

## 2018-06-15 PROCEDURE — 85018 HEMOGLOBIN: CPT | Performed by: PHYSICIAN ASSISTANT

## 2018-06-15 PROCEDURE — 99222 1ST HOSP IP/OBS MODERATE 55: CPT | Performed by: INTERNAL MEDICINE

## 2018-06-15 PROCEDURE — 80053 COMPREHEN METABOLIC PANEL: CPT | Performed by: PHYSICIAN ASSISTANT

## 2018-06-15 RX ORDER — ATROPA BELLADONNA AND OPIUM 16.2; 6 MG/1; MG/1
1 SUPPOSITORY RECTAL EVERY 6 HOURS PRN
Status: DISCONTINUED | OUTPATIENT
Start: 2018-06-15 | End: 2018-06-18 | Stop reason: HOSPADM

## 2018-06-15 RX ORDER — BISACODYL 10 MG
10 SUPPOSITORY, RECTAL RECTAL ONCE
Status: DISCONTINUED | OUTPATIENT
Start: 2018-06-15 | End: 2018-06-18 | Stop reason: HOSPADM

## 2018-06-15 RX ORDER — METOPROLOL TARTRATE 100 MG/1
100 TABLET ORAL EVERY 12 HOURS SCHEDULED
Status: DISCONTINUED | OUTPATIENT
Start: 2018-06-15 | End: 2018-06-18 | Stop reason: HOSPADM

## 2018-06-15 RX ORDER — PANTOPRAZOLE SODIUM 40 MG/1
40 TABLET, DELAYED RELEASE ORAL
Status: DISCONTINUED | OUTPATIENT
Start: 2018-06-16 | End: 2018-06-18 | Stop reason: HOSPADM

## 2018-06-15 RX ORDER — POLYETHYLENE GLYCOL 3350 17 G/17G
17 POWDER, FOR SOLUTION ORAL 2 TIMES DAILY
Status: DISCONTINUED | OUTPATIENT
Start: 2018-06-15 | End: 2018-06-18 | Stop reason: HOSPADM

## 2018-06-15 RX ORDER — ONDANSETRON 2 MG/ML
4 INJECTION INTRAMUSCULAR; INTRAVENOUS EVERY 4 HOURS PRN
Status: DISCONTINUED | OUTPATIENT
Start: 2018-06-15 | End: 2018-06-18 | Stop reason: HOSPADM

## 2018-06-15 RX ORDER — ESCITALOPRAM OXALATE 10 MG/1
10 TABLET ORAL DAILY
Status: DISCONTINUED | OUTPATIENT
Start: 2018-06-15 | End: 2018-06-18 | Stop reason: HOSPADM

## 2018-06-15 RX ORDER — AMOXICILLIN 250 MG
2 CAPSULE ORAL
Status: DISCONTINUED | OUTPATIENT
Start: 2018-06-15 | End: 2018-06-18 | Stop reason: HOSPADM

## 2018-06-15 RX ORDER — LIDOCAINE HYDROCHLORIDE 20 MG/ML
JELLY TOPICAL ONCE
Status: COMPLETED | OUTPATIENT
Start: 2018-06-15 | End: 2018-06-15

## 2018-06-15 RX ORDER — METOPROLOL TARTRATE 100 MG/1
100 TABLET ORAL EVERY 12 HOURS SCHEDULED
Status: DISCONTINUED | OUTPATIENT
Start: 2018-06-15 | End: 2018-06-15

## 2018-06-15 RX ORDER — TAMSULOSIN HYDROCHLORIDE 0.4 MG/1
0.4 CAPSULE ORAL
Status: DISCONTINUED | OUTPATIENT
Start: 2018-06-15 | End: 2018-06-18 | Stop reason: HOSPADM

## 2018-06-15 RX ORDER — OXYBUTYNIN CHLORIDE 5 MG/1
5 TABLET ORAL 2 TIMES DAILY
Status: DISCONTINUED | OUTPATIENT
Start: 2018-06-15 | End: 2018-06-18 | Stop reason: HOSPADM

## 2018-06-15 RX ORDER — DIGOXIN 125 MCG
125 TABLET ORAL DAILY
Status: DISCONTINUED | OUTPATIENT
Start: 2018-06-15 | End: 2018-06-18 | Stop reason: HOSPADM

## 2018-06-15 RX ADMIN — SENNOSIDES AND DOCUSATE SODIUM 2 TABLET: 8.6; 5 TABLET ORAL at 21:23

## 2018-06-15 RX ADMIN — SODIUM CHLORIDE 1000 ML: 0.9 INJECTION, SOLUTION INTRAVENOUS at 13:45

## 2018-06-15 RX ADMIN — TAMSULOSIN HYDROCHLORIDE 0.4 MG: 0.4 CAPSULE ORAL at 16:15

## 2018-06-15 RX ADMIN — OXYBUTYNIN CHLORIDE 5 MG: 5 TABLET ORAL at 17:40

## 2018-06-15 RX ADMIN — POLYETHYLENE GLYCOL 3350 17 G: 17 POWDER, FOR SOLUTION ORAL at 17:40

## 2018-06-15 RX ADMIN — LIDOCAINE HYDROCHLORIDE: 20 JELLY TOPICAL at 13:50

## 2018-06-15 NOTE — ASSESSMENT & PLAN NOTE
· Baseline creatinine 1 4-1 5, noted to be 1 84 today  · This could be due to obstructive uropathy and may improved tomorrow now that Pacheco is in place  · Would recommend holding ACE-inhibitor and diuretic at least for today until we can recheck BMP in a m , but will not provide supplemental IV fluids at this point unless absolutely necessary

## 2018-06-15 NOTE — PLAN OF CARE
DISCHARGE PLANNING     Discharge to home or other facility with appropriate resources Progressing        GENITOURINARY - ADULT     Maintains or returns to baseline urinary function Progressing     Absence of urinary retention Progressing     Urinary catheter remains patent Progressing        INFECTION - ADULT     Absence or prevention of progression during hospitalization Progressing        Knowledge Deficit     Patient/family/caregiver demonstrates understanding of disease process, treatment plan, medications, and discharge instructions Progressing        PAIN - ADULT     Verbalizes/displays adequate comfort level or baseline comfort level Progressing        Potential for Falls     Patient will remain free of falls Progressing        SAFETY ADULT     Maintain or return to baseline ADL function Progressing     Maintain or return mobility status to optimal level Progressing

## 2018-06-15 NOTE — H&P
H&P- [place] 1935, 80 y o  male MRN: [id number]    Unit/Bed#: -01 Encounter: [id number]    Primary Care Provider: Juancarlos Godoy DO   Date and time admitted to hospital: 6/15/2018  9:04 AM    * Gross hematuria   Assessment & Plan    · Patient on anticoagulation and aspirin presents with acute urinary retention and now after Pacheco placement is noted to have gross hematuria  · Hold eliquis and aspirin  · Consult Urology  · Start CBI  · Monitor serial H&H and transfuse if needed   · Check urine culture given concern for possible cystitis on CT scan--no sepsis criteria to warrant antibiotics at this time        Urinary retention   Assessment & Plan    · Possibly due to prostate enlargement verses constipation  · Maintain Pacheco  · Add Flomax        Constipation   Assessment & Plan    · Recommend aggressive bowel regimen        Atrial fibrillation (HCC)   Assessment & Plan    · Chronic AFib on eliquis, no history of stroke--needs to be held due to hematuria as per above  · Continue digoxin and Lopressor        Acute kidney injury superimposed on chronic kidney disease ([facility] )   Assessment & Plan    · Baseline creatinine 1 4-1 5, noted to be 1 84 today  · This could be due to obstructive uropathy and may improved tomorrow now that Pacheco is in place  · Would recommend holding ACE-inhibitor and diuretic at least for today until we can recheck BMP in a m , but will not provide supplemental IV fluids at this point unless absolutely necessary        Chronic systolic CHF (congestive heart failure) ([facility] )   Assessment & Plan    · 2D echocardiogram in February 2018 showed ejection fraction 45%  · Patient reported sense of being very tired and some slight difficulty breathing over the past couple days but currently does not appear to be volume overloaded  · Monitor input/output closely          VTE Prophylaxis: Pharmacologic VTE Prophylaxis contraindicated due to hematuria  / sequential compression device   Code Status: dnr per d/w patient and daughter in law  POLST: POLST is not applicable to this patient  Discussion with family:  Spoke with daughter in law at bedside    Anticipated Length of Stay:  Patient will be admitted on an Inpatient basis with an anticipated length of stay of greater than 2 midnights  Justification for Hospital Stay:  Hematuria    Total Time for Visit, including Counseling / Coordination of Care: 1 hour  Greater than 50% of this total time spent on direct patient counseling and coordination of care  Chief Complaint:  Inability to pass urine    History of Present Illness:    Carla Godoy is a 80 y o  male who was brought in by his daughter in law after complaining of inability to pass his urine  Apparently he has also not had a bowel movement for at least 3 days  His daughter contacted the family doctor and was told to bring him into the hospital   She admits that they were supposed to see Urology as an outpatient but various things have limited the ability to have that appointment and therefore they have not seen anyone as an outpatient  Upon arrival to the hospital the patient had Pacheco catheter placed initially with pink urine and now with dark bloody urine  Patient reports his discomfort at a 3/10  He has not had any severe suprapubic pain and has not had any fevers or chills  Review of Systems:    Review of Systems   Constitutional: Positive for activity change and fatigue  Negative for appetite change, chills, diaphoresis, fever and unexpected weight change  Respiratory: Positive for shortness of breath  Negative for chest tightness  On Wednesday or Thursday patient had an event where he felt very tired and said he was not breathing too good" but apparently this passed and patient does not report increased leg edema or water weight gain   Cardiovascular: Negative for chest pain, palpitations and leg swelling  Gastrointestinal: Positive for constipation   Negative for abdominal distention, abdominal pain, blood in stool, diarrhea, nausea and vomiting  Genitourinary: Positive for difficulty urinating and hematuria  Skin: Positive for color change (Recent redness in his legs for which antibiotics were prescribed last week)  Negative for pallor, rash and wound  Neurological: Negative for dizziness, seizures, syncope, light-headedness and headaches  Past Medical and Surgical History:     Past Medical History:   Diagnosis Date    Atrial fibrillation ([address] )     Hypertension     Macular degeneration        Past Surgical History:   Procedure Laterality Date    ESOPHAGOGASTRODUODENOSCOPY N/A 2/15/2018    Procedure: ESOPHAGOGASTRODUODENOSCOPY (EGD); Surgeon: Cirilo Murguia MD;  [facility];  Service: Gastroenterology       Meds/Allergies:    Prior to Admission medications    Medication Sig Start Date End Date Taking?  Authorizing Provider   apixaban (ELIQUIS) 2 5 mg Take 1 tablet (2 5 mg total) by mouth 2 (two) times a day 2/18/18  Yes Malik Hunt MD   aspirin (ECOTRIN LOW STRENGTH) 81 mg EC tablet Take by mouth   Yes Historical Provider, MD   digoxin (LANOXIN) 0 125 mg tablet Take 125 mcg by mouth daily 2/23/18  Yes Historical Provider, MD   escitalopram (LEXAPRO) 10 mg tablet Take 1 tablet (10 mg total) by mouth daily 2/19/18  Yes Malik Hunt MD   lisinopril (ZESTRIL) 2 5 mg tablet Take 2 5 mg by mouth daily 3/31/18  Yes Historical Provider, MD   loratadine (CLARITIN) 10 mg tablet Take by mouth daily as needed     Yes Historical Provider, MD   metoprolol tartrate (LOPRESSOR) 100 mg tablet Take 1 tablet (100 mg total) by mouth every 12 (twelve) hours 2/18/18  Yes Malik Hunt MD   oxybutynin (DITROPAN) 5 mg tablet Take 1 tablet by mouth 2 (two) times a day   Yes Historical Provider, MD   pantoprazole (PROTONIX) 40 mg tablet Take 1 tablet (40 mg total) by mouth daily in the early morning 4/3/18  Yes Myles Gonzalez PA-C   torsemide (DEMADEX) 10 mg tablet Take 1 tablet (10 mg total) by mouth daily 2/19/18  Yes Doris Shipley MD   UNKNOWN TO PATIENT Antibiotic - cellulitis   Yes Historical Provider, MD     I have reviewed home medications using allscripts  Allergies: No Known Allergies    Social History:     Marital Status:    Occupation:   Patient Pre-hospital Living Situation:  Lives in an in-law suite with his son and daughter-in-law  One step to enter and 1 step once he is in there  Patient Pre-hospital Level of Mobility:  Cane and walker  Patient Pre-hospital Diet Restrictions:   Substance Use History:   History   Alcohol Use No     History   Smoking Status    Former Smoker   Smokeless Tobacco    Never Used     History   Drug Use No       Family History:    non-contributory    Physical Exam:     Vitals:   Blood Pressure: 124/56 (06/15/18 1450)  Pulse: 87 (06/15/18 1450)  Temperature: 97 9 °F (36 6 °C) (06/15/18 1450)  Temp Source: Oral (06/15/18 1450)  Respirations: 20 (06/15/18 1450)  Height: 5' 10" (177 8 cm) (06/15/18 1450)  Weight - Scale: 106 kg (233 lb 11 oz) (06/15/18 1450)  SpO2: 97 % (06/15/18 1450)    Physical Exam   Constitutional: He appears well-developed and well-nourished  No distress  HENT:   Head: Normocephalic and atraumatic  Mouth/Throat: Oropharynx is clear and moist  No oropharyngeal exudate  Eyes: Conjunctivae are normal  Pupils are equal, round, and reactive to light  Right eye exhibits no discharge  Left eye exhibits no discharge  No scleral icterus  Neck: Neck supple  Cardiovascular: Normal rate  No murmur heard  Irregular  No murmur  Rate stable   Pulmonary/Chest: Effort normal and breath sounds normal  No respiratory distress  He has no wheezes  He has no rales  Lungs are clear no respiratory distress  No tachypnea   Abdominal: Soft  He exhibits no distension  There is no tenderness  There is no rebound and no guarding     obese   Genitourinary:   Genitourinary Comments: Pacheco in place with dark red urine Musculoskeletal: He exhibits no edema, tenderness or deformity  Neurological: He is alert  Awake alert and interactive, looks to his daughter-in-law to provide the bulk of his history  No tremor or focal deficits noted  Skin: Skin is warm and dry  No rash noted  He is not diaphoretic  No erythema  No pallor  No skin changes of the legs that appear to be consistent with cellulitis   Psychiatric: He has a normal mood and affect  His behavior is normal    Vitals reviewed  Additional Data:     Lab Results: I have personally reviewed pertinent reports  Results from last 7 days  Lab Units 06/15/18  0939   WBC Thousand/uL 8 26   HEMOGLOBIN g/dL 12 9   HEMATOCRIT % 40 5   PLATELETS Thousands/uL 269   NEUTROS PCT % 82*   LYMPHS PCT % 9*   MONOS PCT % 8   EOS PCT % 2       Results from last 7 days  Lab Units 06/15/18  0939   SODIUM mmol/L 135*   POTASSIUM mmol/L 4 7   CHLORIDE mmol/L 99*   CO2 mmol/L 25   BUN mg/dL 30*   CREATININE mg/dL 1 84*   CALCIUM mg/dL 8 9   TOTAL PROTEIN g/dL 8 0   BILIRUBIN TOTAL mg/dL 0 80   ALK PHOS U/L 72   ALT U/L 29   AST U/L 30   GLUCOSE RANDOM mg/dL 126       Results from last 7 days  Lab Units 06/15/18  0939   INR  1 18*               Imaging: I have personally reviewed pertinent reports  CT abdomen pelvis wo contrast   Final Result by Wojciech Hope MD (06/15 1109)      Perivesicular inflammatory stranding suggesting cystitis  Large volume of rectal stool  Cholelithiasis  Prostatomegaly  Workstation performed: [id number]             EKG, Pathology, and Other Studies Reviewed on Admission:   · EKG: afib    Allscripts / Epic Records Reviewed: Yes     ** Please Note: This note has been constructed using a voice recognition system   **

## 2018-06-15 NOTE — ED PROVIDER NOTES
History  Chief Complaint   Patient presents with    Constipation     C/o last BM 3 days ago  Colace ineffective   Urinary Retention     C/o urinary retention  States last  output was 1900 last night  During last hospital stay, pt had episode of  retention, did not follow up w/ urologist        26-year-old male presents to the emergency department with complaints of abdominal pain  States that he has had constipation over the past 3 days and has been unable to have a bowel movement despite use of over-the-counter stool softeners  Patient also states that he has had some lower abdominal to comfort and difficulty urinating since last night  Reports 1 previous incidence of urinary retention when he was hospitalized in December of 2017  Does not follow with Urology routine basis  He has not had any fevers or chills  No nausea or vomiting  No recent changes in medications  History provided by:  Patient   used: No    Constipation   Severity:  Moderate  Time since last bowel movement:  3 days  Timing:  Unable to specify  Progression:  Unchanged  Context: not dehydration, not dietary changes, not medication, not narcotics and not stress    Stool description:  None produced  Unusual stool frequency:  Daily  Relieved by:  Nothing  Ineffective treatments:  Stool softeners  Associated symptoms: abdominal pain    Associated symptoms: no anorexia, no back pain, no diarrhea, no dysuria, no fever, no flatus, no hematochezia, no nausea, no urinary retention and no vomiting        Prior to Admission Medications   Prescriptions Last Dose Informant Patient Reported? Taking?    UNKNOWN TO PATIENT   Yes Yes   Sig: Antibiotic - cellulitis   apixaban (ELIQUIS) 2 5 mg  Self No Yes   Sig: Take 1 tablet (2 5 mg total) by mouth 2 (two) times a day   aspirin (ECOTRIN LOW STRENGTH) 81 mg EC tablet  Self Yes Yes   Sig: Take by mouth   digoxin (LANOXIN) 0 125 mg tablet   Yes Yes   Sig: Take 125 mcg by mouth daily   escitalopram (LEXAPRO) 10 mg tablet  Self No Yes   Sig: Take 1 tablet (10 mg total) by mouth daily   lisinopril (ZESTRIL) 2 5 mg tablet   Yes Yes   Sig: Take 2 5 mg by mouth daily   loratadine (CLARITIN) 10 mg tablet  Self Yes Yes   Sig: Take by mouth daily as needed     metoprolol tartrate (LOPRESSOR) 100 mg tablet  Self No Yes   Sig: Take 1 tablet (100 mg total) by mouth every 12 (twelve) hours   oxybutynin (DITROPAN) 5 mg tablet  Self Yes Yes   Sig: Take 1 tablet by mouth 2 (two) times a day   pantoprazole (PROTONIX) 40 mg tablet   No Yes   Sig: Take 1 tablet (40 mg total) by mouth daily in the early morning   torsemide (DEMADEX) 10 mg tablet  Self No Yes   Sig: Take 1 tablet (10 mg total) by mouth daily      Facility-Administered Medications: None       Past Medical History:   Diagnosis Date    Atrial fibrillation ([address] )     Hypertension     Macular degeneration        Past Surgical History:   Procedure Laterality Date    ESOPHAGOGASTRODUODENOSCOPY N/A 2/15/2018    Procedure: ESOPHAGOGASTRODUODENOSCOPY (EGD); Surgeon: Yash Awad MD;  Location: [facility];  Service: Gastroenterology       Family History   Problem Relation Age of Onset    No Known Problems Mother      I have reviewed and agree with the history as documented  Social History   Substance Use Topics    Smoking status: Former Smoker    Smokeless tobacco: Never Used    Alcohol use No        Review of Systems   Constitutional: Negative for activity change, appetite change, chills and fever  HENT: Negative for congestion, dental problem, drooling, ear discharge, ear pain, mouth sores, nosebleeds, rhinorrhea, sore throat and trouble swallowing  Eyes: Negative for pain, discharge and itching  Respiratory: Negative for cough, chest tightness, shortness of breath and wheezing  Cardiovascular: Negative for chest pain and palpitations  Gastrointestinal: Positive for abdominal pain and constipation   Negative for anorexia, blood in stool, diarrhea, flatus, hematochezia, nausea and vomiting  Endocrine: Negative for cold intolerance and heat intolerance  Genitourinary: Positive for decreased urine volume  Negative for difficulty urinating, dysuria, flank pain, frequency and urgency  Musculoskeletal: Negative for back pain  Skin: Negative for rash and wound  Allergic/Immunologic: Negative for food allergies and immunocompromised state  Neurological: Negative for dizziness, seizures, syncope, weakness, numbness and headaches  Psychiatric/Behavioral: Negative for agitation, behavioral problems and confusion  Physical Exam  Physical Exam   Constitutional: He is oriented to person, place, and time  He appears well-developed and well-nourished  No distress  HENT:   Head: Normocephalic and atraumatic  Right Ear: External ear normal    Left Ear: External ear normal    Mouth/Throat: Oropharynx is clear and moist  No oropharyngeal exudate  Eyes: Conjunctivae are normal    Neck: No JVD present  No tracheal deviation present  Cardiovascular: Normal rate, regular rhythm and normal heart sounds  Exam reveals no gallop and no friction rub  No murmur heard  Pulmonary/Chest: Effort normal and breath sounds normal  No respiratory distress  He has no wheezes  He has no rales  He exhibits no tenderness  Abdominal: Soft  Bowel sounds are normal  He exhibits no distension  There is tenderness in the suprapubic area and left lower quadrant  There is no guarding  Musculoskeletal: Normal range of motion  He exhibits no edema, tenderness or deformity  Lymphadenopathy:     He has no cervical adenopathy  Neurological: He is alert and oriented to person, place, and time  Skin: Skin is warm and dry  No rash noted  He is not diaphoretic  No erythema  Psychiatric: He has a normal mood and affect  His behavior is normal    Nursing note and vitals reviewed        Vital Signs  ED Triage Vitals   Temperature Pulse Respirations Blood Pressure SpO2   06/15/18 0907 06/15/18 0907 06/15/18 0907 06/15/18 0907 06/15/18 0907   99 1 °F (37 3 °C) 103 18 138/92 96 %      Temp Source Heart Rate Source Patient Position - Orthostatic VS BP Location FiO2 (%)   06/15/18 0907 06/15/18 0907 06/15/18 0907 06/15/18 0907 --   Oral Monitor Sitting Right arm       Pain Score       06/15/18 1056       No Pain           Vitals:    06/15/18 1341 06/15/18 1345 06/15/18 1415 06/15/18 1450   BP: 94/55 94/55 111/64 124/56   Pulse: 74 70 92 87   Patient Position - Orthostatic VS: Lying Lying Lying Lying       Visual Acuity      ED Medications  Medications   bisacodyl (DULCOLAX) rectal suppository 10 mg (0 mg Rectal Hold 6/15/18 1250)   digoxin (LANOXIN) tablet 125 mcg (125 mcg Oral Not Given 6/15/18 1623)   escitalopram (LEXAPRO) tablet 10 mg (10 mg Oral Not Given 6/15/18 1624)   pantoprazole (PROTONIX) EC tablet 40 mg (not administered)   ondansetron (ZOFRAN) injection 4 mg (not administered)   polyethylene glycol (MIRALAX) packet 17 g (not administered)   senna-docusate sodium (SENOKOT S) 8 6-50 mg per tablet 2 tablet (not administered)   tamsulosin (FLOMAX) capsule 0 4 mg (0 4 mg Oral Given 6/15/18 1615)   metoprolol tartrate (LOPRESSOR) tablet 100 mg (not administered)   oxybutynin (DITROPAN) tablet 5 mg (not administered)   belladonna-opium (B&O SUPPOSITORY) 16 2-60 mg per suppository 1 suppository (not administered)   lidocaine (URO-JET) 2 % topical gel ( Topical Given 6/15/18 1350)   sodium chloride 0 9 % bolus 1,000 mL (1,000 mL Intravenous New Bag 6/15/18 1345)       Diagnostic Studies  Results Reviewed     Procedure Component Value Units Date/Time    [place] draw [[id number]] Collected:  06/15/18 1421    Lab Status:  Final result Specimen:  Blood Updated:  06/15/18 0592    Narrative: The following orders were created for panel order [place] draw    Procedure                               Abnormality         Status                     --------- -----------         ------                     Vicky Polka Top 3 ml on XLOB[[id number]]                         Final result               Lavender Top 7ml on XGDW[[id number]]                          Final result                 Please view results for these tests on the individual orders      Urine Microscopic [[id number]]  (Abnormal) Collected:  06/15/18 1021    Lab Status:  Final result Specimen:  Urine from Urine, Indwelling Pacheco Catheter Updated:  06/15/18 1035     RBC, UA Innumerable (A) /hpf      WBC, UA 0-1 (A) /hpf      Epithelial Cells None Seen /hpf      Bacteria, UA Occasional /hpf     ED Urine Macroscopic [[id number]]  (Abnormal) Collected:  06/15/18 1021    Lab Status:  Final result Specimen:  Urine Updated:  06/15/18 1016     Color, UA Bloody     Clarity, UA Cloudy     pH, UA 6 0     Leukocytes, UA Negative     Nitrite, UA Negative     Protein,  (2+) (A) mg/dl      Glucose, UA Negative mg/dl      Ketones, UA Negative mg/dl      Urobilinogen, UA 0 2 E U /dl      Bilirubin, UA Negative     Blood, UA Large (A)     Specific [place], UA 1 020    Narrative:       CLINITEK RESULT    Comprehensive metabolic panel [[id number]]  (Abnormal) Collected:  06/15/18 0939    Lab Status:  Final result Specimen:  Blood from Arm, Left Updated:  06/15/18 1012     Sodium 135 (L) mmol/L      Potassium 4 7 mmol/L      Chloride 99 (L) mmol/L      CO2 25 mmol/L      Anion Gap 11 mmol/L      BUN 30 (H) mg/dL      Creatinine 1 84 (H) mg/dL      Glucose 126 mg/dL      Calcium 8 9 mg/dL      AST 30 U/L      ALT 29 U/L      Alkaline Phosphatase 72 U/L      Total Protein 8 0 g/dL      Albumin 4 0 g/dL      Total Bilirubin 0 80 mg/dL      eGFR 33 ml/min/1 73sq m     Narrative:         National Kidney Disease Education Program recommendations are as follows:  GFR calculation is accurate only with a steady state creatinine  Chronic Kidney disease less than 60 ml/min/1 73 sq  meters  Kidney failure less than 15 ml/min/1 73 sq  meters  Protime-INR [[id number]]  (Abnormal) Collected:  06/15/18 0939    Lab Status:  Final result Specimen:  Blood from Arm, Left Updated:  06/15/18 1004     Protime 14 7 (H) seconds      INR 1 18 (H)    APTT [[id number]]  (Normal) Collected:  06/15/18 0939    Lab Status:  Final result Specimen:  Blood from Arm, Left Updated:  06/15/18 1004     PTT 34 seconds     CBC and differential [[id number]]  (Abnormal) Collected:  06/15/18 0939    Lab Status:  Final result Specimen:  Blood from Arm, Left Updated:  06/15/18 0948     WBC 8 26 Thousand/uL      RBC 4 73 Million/uL      Hemoglobin 12 9 g/dL      Hematocrit 40 5 %      MCV 86 fL      MCH 27 3 pg      MCHC 31 9 g/dL      RDW 14 3 %      MPV 9 7 fL      Platelets 210 Thousands/uL      Neutrophils Relative 82 (H) %      Lymphocytes Relative 9 (L) %      Monocytes Relative 8 %      Eosinophils Relative 2 %      Basophils Relative 0 %      Neutrophils Absolute 6 79 Thousands/µL      Lymphocytes Absolute 0 72 Thousands/µL      Monocytes Absolute 0 62 Thousand/µL      Eosinophils Absolute 0 12 Thousand/µL      Basophils Absolute 0 01 Thousands/µL                  CT abdomen pelvis wo contrast   Final Result by Emelia Avila MD (06/15 1109)      Perivesicular inflammatory stranding suggesting cystitis  Large volume of rectal stool  Cholelithiasis  Prostatomegaly  Workstation performed: [id number]                    Procedures  Procedures       Phone Contacts  ED Phone Contact    ED Course  ED Course as of Tolu 15 1703   Fri Tolu 15, 2018   1224 Spoke with patient and daughter regarding test results  He would rather try suppository at home  1329   Patient with continuous bleeding in the urine despite irrigation  Will need to change coulter to 3-way and start CBI                                  MDM  Number of Diagnoses or Management Options  Constipation:   Gross hematuria:   Urinary retention:   Diagnosis management comments:  Differential diagnosis includes but not limited to:   Urinary retention, constipation, diverticulitis, UTI  Amount and/or Complexity of Data Reviewed  Clinical lab tests: ordered and reviewed  Tests in the radiology section of CPT®: ordered and reviewed      CritCare Time    Disposition  Final diagnoses:   Urinary retention   Constipation   Gross hematuria     Time reflects when diagnosis was documented in both MDM as applicable and the Disposition within this note     Time User Action Codes Description Comment    6/15/2018 12:35 PM Neal Justice 26 [R33 9] Urinary retention     6/15/2018 12:35 PM Madeleine Rust Add [K59 00] Constipation     6/15/2018  1:43 PM Yecenia Donato M Add [R31 0] Gross hematuria     6/15/2018  3:09 PM Colonel Rodes Modify [R33 9] Urinary retention     6/15/2018  3:09 PM Colonel Rodes Modify [K59 00] Constipation     6/15/2018  3:09 PM Colonel Rodes Modify [R31 0] Gross hematuria       ED Disposition     ED Disposition Condition Comment    [facility] admitted to [facility] by Dr Jaron Lopez          Follow-up Information     Follow up With Specialties Details Why Christian [facility]  [facility]  For Urology [facility] Schedule an appointment as soon as possible for a visit  Patrice Miller [address] [place] [id number] [phone]          Current Discharge Medication List      CONTINUE these medications which have NOT CHANGED    Details   apixaban (ELIQUIS) 2 5 mg Take 1 tablet (2 5 mg total) by mouth 2 (two) times a day  Qty: 60 tablet, Refills: 0    Associated Diagnoses: Atrial fibrillation (HCC)      aspirin (ECOTRIN LOW STRENGTH) 81 mg EC tablet Take by mouth      digoxin (LANOXIN) 0 125 mg tablet Take 125 mcg by mouth daily  Refills: 3      escitalopram (LEXAPRO) 10 mg tablet Take 1 tablet (10 mg total) by mouth daily  Qty: 30 tablet, Refills: 0    Associated Diagnoses: Depression, unspecified depression type      lisinopril (ZESTRIL) 2 5 mg tablet Take 2 5 mg by mouth daily  Refills: 3      loratadine (CLARITIN) 10 mg tablet Take by mouth daily as needed        metoprolol tartrate (LOPRESSOR) 100 mg tablet Take 1 tablet (100 mg total) by mouth every 12 (twelve) hours  Qty: 60 tablet, Refills: 0    Associated Diagnoses: Atrial fibrillation (HCC)      oxybutynin (DITROPAN) 5 mg tablet Take 1 tablet by mouth 2 (two) times a day      pantoprazole (PROTONIX) 40 mg tablet Take 1 tablet (40 mg total) by mouth daily in the early morning  Qty: 90 tablet, Refills: 2    Associated Diagnoses: Dysphagia, unspecified type      torsemide (DEMADEX) 10 mg tablet Take 1 tablet (10 mg total) by mouth daily  Qty: 30 tablet, Refills: 0    Associated Diagnoses: Chronic systolic CHF (congestive heart failure) (HCC)      UNKNOWN TO PATIENT Antibiotic - cellulitis           No discharge procedures on file      ED Provider  Electronically Signed by           Estefani Baugh PA-C  06/15/18 8672

## 2018-06-15 NOTE — ASSESSMENT & PLAN NOTE
· 2D echocardiogram in February 2018 showed ejection fraction 45%  · Patient reported sense of being very tired and some slight difficulty breathing over the past couple days but currently does not appear to be volume overloaded  · Monitor input/output closely

## 2018-06-15 NOTE — ED NOTES
Manual coulter irrigation unsuccessful  Provider aware, patient to start CBI  500CC instilled, 600cc output  Urine is pink tinted during manual irrigation, then quickly becomes bright red  Clots noted initially with irrigation, resolved          Marie Carvajal RN  06/15/18 0066

## 2018-06-15 NOTE — ED NOTES
Provider aware of patient's urine color, patient to be manually irrigated        Brittaney Catalan RN  06/15/18 5784

## 2018-06-15 NOTE — ASSESSMENT & PLAN NOTE
· Patient on anticoagulation and aspirin presents with acute urinary retention and now after Pacheco placement is noted to have gross hematuria  · Hold eliquis and aspirin  · Consult Urology  · Start CBI  · Monitor serial H&H and transfuse if needed   · Check urine culture given concern for possible cystitis on CT scan--no sepsis criteria to warrant antibiotics at this time

## 2018-06-15 NOTE — ASSESSMENT & PLAN NOTE
· Chronic AFib on eliquis, no history of stroke--needs to be held due to hematuria as per above  · Continue digoxin and Lopressor

## 2018-06-16 LAB
ANION GAP SERPL CALCULATED.3IONS-SCNC: 10 MMOL/L (ref 4–13)
ATRIAL RATE: 113 BPM
BACTERIA UR CULT: NORMAL
BASOPHILS # BLD AUTO: 0.01 THOUSANDS/ΜL (ref 0–0.1)
BASOPHILS NFR BLD AUTO: 0 % (ref 0–1)
BUN SERPL-MCNC: 28 MG/DL (ref 5–25)
CALCIUM SERPL-MCNC: 8.9 MG/DL (ref 8.3–10.1)
CHLORIDE SERPL-SCNC: 100 MMOL/L (ref 100–108)
CO2 SERPL-SCNC: 25 MMOL/L (ref 21–32)
CREAT SERPL-MCNC: 1.75 MG/DL (ref 0.6–1.3)
EOSINOPHIL # BLD AUTO: 0.29 THOUSAND/ΜL (ref 0–0.61)
EOSINOPHIL NFR BLD AUTO: 3 % (ref 0–6)
ERYTHROCYTE [DISTWIDTH] IN BLOOD BY AUTOMATED COUNT: 14.5 % (ref 11.6–15.1)
GFR SERPL CREATININE-BSD FRML MDRD: 35 ML/MIN/1.73SQ M
GLUCOSE SERPL-MCNC: 115 MG/DL (ref 65–140)
HCT VFR BLD AUTO: 38.8 % (ref 36.5–49.3)
HCT VFR BLD AUTO: 39.1 % (ref 36.5–49.3)
HGB BLD-MCNC: 12.1 G/DL (ref 12–17)
HGB BLD-MCNC: 12.4 G/DL (ref 12–17)
LYMPHOCYTES # BLD AUTO: 1.09 THOUSANDS/ΜL (ref 0.6–4.47)
LYMPHOCYTES NFR BLD AUTO: 12 % (ref 14–44)
MCH RBC QN AUTO: 27.3 PG (ref 26.8–34.3)
MCHC RBC AUTO-ENTMCNC: 31.7 G/DL (ref 31.4–37.4)
MCV RBC AUTO: 86 FL (ref 82–98)
MONOCYTES # BLD AUTO: 1.15 THOUSAND/ΜL (ref 0.17–1.22)
MONOCYTES NFR BLD AUTO: 12 % (ref 4–12)
NEUTROPHILS # BLD AUTO: 6.7 THOUSANDS/ΜL (ref 1.85–7.62)
NEUTS SEG NFR BLD AUTO: 73 % (ref 43–75)
PLATELET # BLD AUTO: 270 THOUSANDS/UL (ref 149–390)
PMV BLD AUTO: 10.6 FL (ref 8.9–12.7)
POTASSIUM SERPL-SCNC: 4.5 MMOL/L (ref 3.5–5.3)
QRS AXIS: -25 DEGREES
QRSD INTERVAL: 98 MS
QT INTERVAL: 346 MS
QTC INTERVAL: 434 MS
RBC # BLD AUTO: 4.55 MILLION/UL (ref 3.88–5.62)
SODIUM SERPL-SCNC: 135 MMOL/L (ref 136–145)
T WAVE AXIS: 51 DEGREES
VENTRICULAR RATE: 95 BPM
WBC # BLD AUTO: 9.24 THOUSAND/UL (ref 4.31–10.16)

## 2018-06-16 PROCEDURE — 99232 SBSQ HOSP IP/OBS MODERATE 35: CPT | Performed by: INTERNAL MEDICINE

## 2018-06-16 PROCEDURE — 0T9B70Z DRAINAGE OF BLADDER WITH DRAINAGE DEVICE, VIA NATURAL OR ARTIFICIAL OPENING: ICD-10-PCS | Performed by: UROLOGY

## 2018-06-16 PROCEDURE — 85018 HEMOGLOBIN: CPT | Performed by: PHYSICIAN ASSISTANT

## 2018-06-16 PROCEDURE — 85025 COMPLETE CBC W/AUTO DIFF WBC: CPT | Performed by: PHYSICIAN ASSISTANT

## 2018-06-16 PROCEDURE — 3E1K78Z IRRIGATION OF GENITOURINARY TRACT USING IRRIGATING SUBSTANCE, VIA NATURAL OR ARTIFICIAL OPENING: ICD-10-PCS | Performed by: UROLOGY

## 2018-06-16 PROCEDURE — 85014 HEMATOCRIT: CPT | Performed by: PHYSICIAN ASSISTANT

## 2018-06-16 PROCEDURE — 99222 1ST HOSP IP/OBS MODERATE 55: CPT | Performed by: UROLOGY

## 2018-06-16 PROCEDURE — 0TCB7ZZ EXTIRPATION OF MATTER FROM BLADDER, VIA NATURAL OR ARTIFICIAL OPENING: ICD-10-PCS | Performed by: UROLOGY

## 2018-06-16 PROCEDURE — 93010 ELECTROCARDIOGRAM REPORT: CPT | Performed by: INTERNAL MEDICINE

## 2018-06-16 PROCEDURE — 80048 BASIC METABOLIC PNL TOTAL CA: CPT | Performed by: PHYSICIAN ASSISTANT

## 2018-06-16 RX ADMIN — METOPROLOL TARTRATE 100 MG: 100 TABLET ORAL at 22:47

## 2018-06-16 RX ADMIN — POLYETHYLENE GLYCOL 3350 17 G: 17 POWDER, FOR SOLUTION ORAL at 17:55

## 2018-06-16 RX ADMIN — OXYBUTYNIN CHLORIDE 5 MG: 5 TABLET ORAL at 08:27

## 2018-06-16 RX ADMIN — ESCITALOPRAM OXALATE 10 MG: 10 TABLET ORAL at 08:27

## 2018-06-16 RX ADMIN — DIGOXIN 125 MCG: 125 TABLET ORAL at 08:27

## 2018-06-16 RX ADMIN — SENNOSIDES AND DOCUSATE SODIUM 2 TABLET: 8.6; 5 TABLET ORAL at 22:47

## 2018-06-16 RX ADMIN — TAMSULOSIN HYDROCHLORIDE 0.4 MG: 0.4 CAPSULE ORAL at 17:55

## 2018-06-16 RX ADMIN — PANTOPRAZOLE SODIUM 40 MG: 40 TABLET, DELAYED RELEASE ORAL at 05:07

## 2018-06-16 RX ADMIN — POLYETHYLENE GLYCOL 3350 17 G: 17 POWDER, FOR SOLUTION ORAL at 08:26

## 2018-06-16 RX ADMIN — OXYBUTYNIN CHLORIDE 5 MG: 5 TABLET ORAL at 17:55

## 2018-06-16 NOTE — CONSULTS
UROLOGY CONSULTATION NOTE     Patient Identifiers: Emeterio Gaucher (MRN [id number])  Service Requesting Consultation:  [facility]  Service Providing Consultation:  Urology, Renny Batista MD    Date of Service: 6/16/2018    Reason for Consultation:  Gross hematuria    History of Present Illness:     Emeterio Gaucher is a 80 y o  with a history of new onset urinary retention along with constipation  Patient is found to have gross hematuria  He has a history of atrial fibrillation and he currently takes Eliquis  On this admission of three-way Pacheco catheter was placed  Clots were irrigated from the bladder and continuous bladder irrigation was initiated  Per my discussion with the RN, there occasionally small clots which are easily irrigated manually  Currently CBI is running in the urine appears light punch color  The patient states that he believes he saw a urologist many years in the past but he cannot recall the name of the urologist   Prior to presentation today, he denies any significant lower urinary tract symptoms  Past Medical, Past Surgical History:     Past Medical History:   Diagnosis Date    Atrial fibrillation ([address] )     Hypertension     Macular degeneration      Past Surgical History:   Procedure Laterality Date    ESOPHAGOGASTRODUODENOSCOPY N/A 2/15/2018    Procedure: ESOPHAGOGASTRODUODENOSCOPY (EGD);   Surgeon: Monica Linares MD;  [facility];  Service: Gastroenterology      Medications, Allergies:   Scheduled Meds:  Current Facility-Administered Medications:  belladonna-opium 1 suppository Rectal Q6H PRN Ami Ledbetter PA-C   bisacodyl 10 mg Rectal Once Andrade Diaz PA-C   digoxin 125 mcg Oral Daily Ami Ledbetter PA-C   escitalopram 10 mg Oral Daily Ami Ledbetter PA-C   metoprolol tartrate 100 mg Oral Q12H [address] Ami Ledbetter PA-C   ondansetron 4 mg Intravenous Q4H PRN Ami Ledbetter PA-C   oxybutynin 5 mg Oral BID Ami Ledbetter PA-C   pantoprazole 40 mg Oral Early Morning Ami KATE Ledbetter   polyethylene glycol 17 g Oral BID Ami Ledbetter PA-C   senna-docusate sodium 2 tablet Oral HS Ami Ledbetter PA-C   tamsulosin 0 4 mg Oral Daily With Dinner Ami Ledbetter PA-C     Continuous Infusions:   PRN Meds:   belladonna-opium    ondansetron    Allergies:  No Known Allergies:    Social and Family History:   Social History:   Social History   Substance Use Topics    Smoking status: Former Smoker    Smokeless tobacco: Never Used    Alcohol use No        History   Smoking Status    Former Smoker   Smokeless Tobacco    Never Used       Family History:  Family History   Problem Relation Age of Onset    No Known Problems Mother    :     Review of Systems:   Gross hematuria  He denies any chest pain or shortness of breath  He denies any nausea, vomiting, fever, or chills  All other systems queried were negative      Physical Exam:     Vitals:    06/16/18 0700   BP: 110/64   Pulse: 89   Resp: 18   Temp: 98 4 °F (36 9 °C)   SpO2: 95%       PE:  General:  He is awake alert oriented he is appropriate and nonhealing nontoxic appearing  HEENT:  Normocephalic, atraumatic, sclerae nonicteric  Cardiac:  Regular  Abdomen:  Soft nontender nondistended  :  No CVA tenderness, 3 way Pacheco catheter in place draining punch colored urine, normal phallus and scrotum  Skin:  Warm  Extremities:  Without edema  Neurologic:  Grossly intact and nonfocal  Gait:  Slightly unsteady  Affect:  Appropriate    Labs:     Lab Results   Component Value Date    HGB 12 1 06/16/2018    HCT 38 8 06/16/2018    WBC 9 24 06/16/2018     06/16/2018   ]    Lab Results   Component Value Date     (L) 06/16/2018    K 4 5 06/16/2018     06/16/2018    CO2 25 06/16/2018    BUN 28 (H) 06/16/2018    CREATININE 1 75 (H) 06/16/2018    CALCIUM 8 9 06/16/2018    GLUCOSE 115 06/16/2018   ]    Imaging:   CT scan reveals prostatomegaly with perivesical stranding to suggest cystitis    ASSESSMENT:     80 y o  old male with  BPH with urinary retention and gross hematuria on anti coagulation  PLAN:   I would continue continuous bladder irrigation with a 3 way Pacheco catheter at this time  I would continue to manually irrigate the Pacheco catheter every 4 hr with 60 cc of normal saline solution or more frequently as needed for clots  Continue  to hold anti-platelet therapy for now  We will reassess CBI within the next 24 hr  Thank you for allowing me to participate in this patients care  Please do not hesitate to call with any additional questions    Popeye Doherty MD

## 2018-06-16 NOTE — ASSESSMENT & PLAN NOTE
· Patient on anticoagulation and aspirin presents with acute urinary retention and now after Pacheco placement is noted to have gross hematuria  · Continue to hold eliquis and aspirin  · Urology consult pending, appreciate input  · Continue CBI  · Monitor serial H&H and transfuse if needed, thankfully has remained stable  · Urine cx pending given concern for possible cystitis on CT scan--no sepsis criteria to warrant antibiotics at this time

## 2018-06-16 NOTE — PROGRESS NOTES
Progress Note - [place] 1935, 80 y o  male MRN: [id number]    Unit/Bed#: -01 Encounter: [id number]    Primary Care Provider: Adamaris Quinteros DO   Date and time admitted to hospital: 6/15/2018  9:04 AM    * Gross hematuria   Assessment & Plan    · Patient on anticoagulation and aspirin presents with acute urinary retention and now after Pacheco placement is noted to have gross hematuria  · Continue to hold eliquis and aspirin  · Urology consult pending, appreciate input  · Continue CBI  · Monitor serial H&H and transfuse if needed, thankfully has remained stable  · Urine cx pending given concern for possible cystitis on CT scan--no sepsis criteria to warrant antibiotics at this time        Urinary retention   Assessment & Plan    · Possibly due to prostate enlargement verses constipation, both noted on CT scan  · Maintain Pacheco  · Added Flomax  · Appreciate urology input         Atrial fibrillation (HCC)   Assessment & Plan    · Chronic AFib on eliquis, no history of stroke--needs to be held due to hematuria as per above  · Continue digoxin and Lopressor  · Known to Dr Gloria Mcnally as outpatient         Constipation   Assessment & Plan    · Recommend aggressive bowel regimen  Pt had small BM this AM per RN  · Minimizing this would help with urinary retention        Essential hypertension   Assessment & Plan    · Pressures stable   Currently off his diuretic and ace-inhibitor         Depression   Assessment & Plan    · Continue lexapro         Chronic systolic CHF (congestive heart failure) ([facility])   Assessment & Plan    · 2D echocardiogram in February 2018 showed ejection fraction 45%  · Patient reported sense of being very tired and some slight difficulty breathing over the past couple days but currently does not appear to be volume overloaded  · Monitor input/output closely  · Consider resuming torsemide tomorrow if creat continues to normalize        Acute kidney injury superimposed on chronic kidney disease ([facility] [address] )   Assessment & Plan    · Baseline creatinine 1 4-1 5, noted to be 1 84 on admission and improved today slightly to 1 75  · This could be due to obstructive uropathy and may continue to improve now that Pacheco is in place  · Would recommend continue holding ACE-inhibitor and diuretic for today until we can recheck BMP in a m , but will not provide supplemental IV fluids at this point unless absolutely necessary          VTE Pharmacologic Prophylaxis:   Pharmacologic: Pharmacologic VTE Prophylaxis contraindicated due to Hematuria  Mechanical VTE Prophylaxis in Place: Yes    Patient Centered Rounds: I have performed bedside rounds with nursing staff today  Discussions with Specialists or Other Care Team Provider:  Appreciate Urology input    Education and Discussions with Family / Patient:  Patient and family at bedside    Time Spent for Care: 30 minutes  More than 50% of total time spent on counseling and coordination of care as described above  Current Length of Stay: 1 day(s)    Current Patient Status: Inpatient   Certification Statement: The patient will continue to require additional inpatient hospital stay due to As above    Discharge Plan:  Not yet medically stable  Needs hematuria to improve prior  Needs urology evaluation  Code Status: Level 3 - DNAR and DNI      Subjective:   Patient reports feeling okay today  He had a small bowel movement this a m  Avelina Rm Per RN he is pulling large clots out of Pacheco  Patient denies any chest pain or shortness of breath  Objective:     Vitals:   Temp (24hrs), Av 2 °F (36 8 °C), Min:97 9 °F (36 6 °C), Max:98 4 °F (36 9 °C)    HR:  [] 89  Resp:  [18-20] 18  BP: ()/(55-64) 110/64  SpO2:  [90 %-97 %] 95 %  Body mass index is 33 53 kg/m²  Input and Output Summary (last 24 hours):        Intake/Output Summary (Last 24 hours) at 18 1211  Last data filed at 18 1201   Gross per 24 hour   Intake              240 ml   Output 1700 ml   Net            -1460 ml       Physical Exam:     Physical Exam   Constitutional: He is oriented to person, place, and time  No distress  Cardiovascular: Normal rate  An irregularly irregular rhythm present  Pulmonary/Chest: Effort normal and breath sounds normal  No respiratory distress  Abdominal: Soft  Bowel sounds are normal  He exhibits no distension  There is no tenderness  Genitourinary:   Genitourinary Comments: Continuous bladder irrigation in place, dark red urine in Pacheco bag, occasional clots noted   Musculoskeletal: He exhibits no edema  Neurological: He is alert and oriented to person, place, and time  Skin: Skin is warm and dry  Psychiatric: He has a normal mood and affect  Nursing note and vitals reviewed  Additional Data:     Labs:      Results from last 7 days  Lab Units 06/16/18  1018 06/16/18  0430   WBC Thousand/uL  --  9 24   HEMOGLOBIN g/dL 12 1 12 4   HEMATOCRIT % 38 8 39 1   PLATELETS Thousands/uL  --  270   NEUTROS PCT %  --  73   LYMPHS PCT %  --  12*   MONOS PCT %  --  12   EOS PCT %  --  3       Results from last 7 days  Lab Units 06/16/18  0430 06/15/18  0939   SODIUM mmol/L 135* 135*   POTASSIUM mmol/L 4 5 4 7   CHLORIDE mmol/L 100 99*   CO2 mmol/L 25 25   BUN mg/dL 28* 30*   CREATININE mg/dL 1 75* 1 84*   CALCIUM mg/dL 8 9 8 9   TOTAL PROTEIN g/dL  --  8 0   BILIRUBIN TOTAL mg/dL  --  0 80   ALK PHOS U/L  --  72   ALT U/L  --  29   AST U/L  --  30   GLUCOSE RANDOM mg/dL 115 126       Results from last 7 days  Lab Units 06/15/18  0939   INR  1 18*       * I Have Reviewed All Lab Data Listed Above  * Additional Pertinent Lab Tests Reviewed:  All Labs Within Last 24 Hours Reviewed    Imaging:    Imaging Reports Reviewed Today Include:  CT scan  Imaging Personally Reviewed by Myself Includes:  None    Recent Cultures (last 7 days):           Last 24 Hours Medication List:     Current Facility-Administered Medications:  belladonna-opium 1 suppository Rectal Q6H PRN Ami Ledbetter, KATE   bisacodyl 10 mg Rectal Once Davi Purcell PA-C   digoxin 125 mcg Oral Daily Ami Ledbetter, KATE   escitalopram 10 mg Oral Daily Ami Ledbetter, KATE   metoprolol tartrate 100 mg Oral Q12H [address] Ami Ledbetter, KATE   ondansetron 4 mg Intravenous Q4H PRN Ami Ledbetter, KATE   oxybutynin 5 mg Oral BID Ami Ledbetter PA-C   pantoprazole 40 mg Oral Early Morning Ami Ledbetter, KATE   polyethylene glycol 17 g Oral BID Ami Ledbetter, KATE   senna-docusate sodium 2 tablet Oral HS Ami Ledbetter, KATE   tamsulosin 0 4 mg Oral Daily With Dinner Dandre Hutchins PA-C        Today, Patient Was Seen By: Osmani Smith PA-C    ** Please Note: Dictation voice to text software may have been used in the creation of this document   **

## 2018-06-16 NOTE — ASSESSMENT & PLAN NOTE
· Baseline creatinine 1 4-1 5, noted to be 1 84 on admission and improved today slightly to 1 75  · This could be due to obstructive uropathy and may continue to improve now that Pacheco is in place  · Would recommend continue holding ACE-inhibitor and diuretic for today until we can recheck BMP in a m , but will not provide supplemental IV fluids at this point unless absolutely necessary

## 2018-06-16 NOTE — ASSESSMENT & PLAN NOTE
· Recommend aggressive bowel regimen   Pt had small BM this AM per RN  · Minimizing this would help with urinary retention

## 2018-06-16 NOTE — ASSESSMENT & PLAN NOTE
· 2D echocardiogram in February 2018 showed ejection fraction 45%  · Patient reported sense of being very tired and some slight difficulty breathing over the past couple days but currently does not appear to be volume overloaded  · Monitor input/output closely  · Consider resuming torsemide tomorrow if creat continues to normalize

## 2018-06-16 NOTE — ASSESSMENT & PLAN NOTE
· Chronic AFib on eliquis, no history of stroke--needs to be held due to hematuria as per above  · Continue digoxin and Lopressor  · Known to Dr Angel Mancuso as outpatient

## 2018-06-16 NOTE — CASE MANAGEMENT
Initial Clinical Review    Admission: Date/Time/Statement: 6/15/18 @ 1345     Orders Placed This Encounter   Procedures    Inpatient Admission (expected length of stay for this patient is greater than two midnights)     Standing Status:   Standing     Number of Occurrences:   1     Order Specific Question:   Admitting Physician     Answer:   Jen Orozco     Order Specific Question:   Level of Care     Answer:   Med Surg [16]     Order Specific Question:   Estimated length of stay     Answer:   More than 2 Midnights     Order Specific Question:   Certification     Answer:   I certify that inpatient services are medically necessary for this patient for a duration of greater than two midnights  See H&P and MD Progress Notes for additional information about the patient's course of treatment  ED: Date/Time/Mode of Arrival:   ED Arrival Information     Expected Arrival Acuity Means of Arrival Escorted By Service Admission Type    - 6/15/2018 08:53 Emergent Walk-In Family Member General Medicine Emergency    Arrival Complaint    urinary retention/constipation          Chief Complaint:   Chief Complaint   Patient presents with    Constipation     C/o last BM 3 days ago  Colace ineffective   Urinary Retention     C/o urinary retention  States last  output was 1900 last night  During last hospital stay, pt had episode of  retention, did not follow up w/ urologist         History of Illness:   Jose Polanco is a 80 y o  male who was brought in by his daughter in law after complaining of inability to pass his urine  Apparently he has also not had a bowel movement for at least 3 days    His daughter contacted the family doctor and was told to bring him into the hospital   She admits that they were supposed to see Urology as an outpatient but various things have limited the ability to have that appointment and therefore they have not seen anyone as an outpatient    ED  NURSING NOTE :   Manual coulter irrigation unsuccessful  Provider aware, patient to start CBI  500CC instilled, 600cc output  Urine is pink tinted during manual irrigation, then quickly becomes bright red  Clots noted initially with irrigation, resolved  Upon arrival to the hospital the patient had Coulter catheter placed initially with pink urine and now with dark bloody urine  Patient reports his discomfort at a 3/10     ED Vital Signs:   ED Triage Vitals   Temperature Pulse Respirations Blood Pressure SpO2   06/15/18 0907 06/15/18 0907 06/15/18 0907 06/15/18 0907 06/15/18 0907   99 1 °F (37 3 °C) 103 18 138/92 96 %         Pain Score       06/15/18 1056       No Pain        Wt Readings from Last 1 Encounters:   06/16/18 106 kg (233 lb 11 oz)       Vital Signs (abnormal):      06/15/18 1345  --  70  18  94/55  --  95 %  None (Room air)  Lying   06/15/18 1341  --  74  18  94/55  --  94 %  None (Room air)  Lying         06/15/18 1056  --   115  18  139/71  --  95 %  None (Room air)  Lying   06/15/18 0907  99 1 °F (37 3 °C)  103  18  138/92  --  96 %  None (Room air)  Sitting         Abnormal Labs/Diagnostic Test Results:    Coulter in place with dark red urine        INR 1 18 (H)     Arm, Left     Sodium 135 (L) mmol/L    Chloride 99 (L) mmol/L    BUN 30 (H) mg/dL    Creatinine 1 84 (H) mg/dL     Indwelling Coulter Catheter     RBC, UA Innumerable (A) /hpf    WBC, UA 0-1 (A) /hpf     URINE CULTURE      Hemoglobin 11 8 (L)       CT abdomen pelvis wo contrast   Perivesicular inflammatory stranding suggesting cystitis  Large volume of rectal stool  Cholelithiasis  Prostatomegaly  · EKG: afib        Hemoglobin  12 9   11 8           ED Treatment:   Medication Administration from 06/15/2018 0853 to 06/15/2018 1436       Date/Time Order Dose Route     06/15/2018 1350 lidocaine (URO-JET) 2 % topical gel   Topical     06/15/2018 1345 sodium chloride 0 9 % bolus 1,000 mL 1,000 mL Intravenous       Past Medical/Surgical History:    Active Ambulatory Problems     Diagnosis Date Noted    Acute kidney injury superimposed on chronic kidney disease ([place] [address] )     Atrial fibrillation ([place] [address] )     Chronic systolic CHF (congestive heart failure) ([place] [address] ) 02/11/2018    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 02/11/2018    Depression 02/12/2018    Staph aureus infection of the left lower extremity 02/12/2018    Stage 3 chronic kidney disease 02/13/2018    Azotemia 02/13/2018    Dysphagia 02/14/2018    Arthritis 06/25/2015    Legally blind 06/25/2015    Peripheral neuropathy 10/19/2015    NICM (nonischemic cardiomyopathy) ([place] [address] ) 06/25/2015    Other proteinuria 02/26/2018    Anemia 02/26/2018    Essential hypertension 04/26/2018     Resolved Ambulatory Problems     Diagnosis Date Noted    Acute kidney injury ([place] [address] ) 02/11/2018    Hyperkalemia 02/13/2018    Hyponatremia 02/13/2018    Food impaction of esophagus 02/11/2018     Past Medical History:    Atrial fibrillation ([place] [address] )     Hypertension     Macular degeneration        Admitting Diagnosis: Urinary retention [R33 9]  Constipation [K59 00]  Gross hematuria [R31 0]    Age/Sex: 80 y o  male    Assessment/Plan:     Gross hematuria   Assessment & Plan     · Patient on anticoagulation and aspirin presents with acute urinary retention and now after Pacheco placement is noted to have gross hematuria  · Hold eliquis and aspirin  · Consult Urology  · Start CBI  · Monitor serial H&H and transfuse if needed   · Check urine culture given concern for possible cystitis on CT scan--no sepsis criteria to warrant antibiotics at this time          Urinary retention   Assessment & Plan     · Possibly due to prostate enlargement verses constipation  · Maintain Pacheco  · Add Flomax          Constipation   Assessment & Plan     · Recommend aggressive bowel regimen          Atrial fibrillation (HCC)   Assessment & Plan     · Chronic AFib on eliquis, no history of stroke--needs to be held due to hematuria as per above  · Continue digoxin and Lopressor          Acute kidney injury superimposed on chronic kidney disease (HCC)   Assessment & Plan     · Baseline creatinine 1 4-1 5, noted to be 1 84 today  · This could be due to obstructive uropathy and may improved tomorrow now that Pacheco is in place  · Would recommend holding ACE-inhibitor and diuretic at least for today until we can recheck BMP in a m , but will not provide supplemental IV fluids at this point unless absolutely necessary          Chronic systolic CHF (congestive heart failure) ([facility])   Assessment & Plan     · 2D echocardiogram in February 2018 showed ejection fraction 45%  · Patient reported sense of being very tired and some slight difficulty breathing over the past couple days but currently does not appear to be volume overloaded  · Monitor input/output closely               Admission Orders:    [address] [facility]  I/O   TREND HEMOGLOBIN AND HEMATOCRIT       Scheduled Meds:   Current Facility-Administered Medications:  belladonna-opium 1 suppository Rectal Q6H PRN   bisacodyl 10 mg Rectal Once   digoxin 125 mcg Oral Daily   escitalopram 10 mg Oral Daily   metoprolol tartrate 100 mg Oral Q12H [facility]   ondansetron 4 mg Intravenous Q4H PRN   oxybutynin 5 mg Oral BID   pantoprazole 40 mg Oral Early Morning   polyethylene glycol 17 g Oral BID   senna-docusate sodium 2 tablet Oral HS   tamsulosin 0 4 mg Oral Daily With Dinner     Continuous Infusions:    PRN Meds:   belladonna-opium    ondansetron

## 2018-06-16 NOTE — ASSESSMENT & PLAN NOTE
· Possibly due to prostate enlargement verses constipation, both noted on CT scan  · Maintain Pacheco  · Added Flomax  · Appreciate urology input

## 2018-06-17 LAB
ANION GAP SERPL CALCULATED.3IONS-SCNC: 8 MMOL/L (ref 4–13)
BASOPHILS # BLD AUTO: 0.01 THOUSANDS/ΜL (ref 0–0.1)
BASOPHILS NFR BLD AUTO: 0 % (ref 0–1)
BUN SERPL-MCNC: 27 MG/DL (ref 5–25)
CALCIUM SERPL-MCNC: 9.3 MG/DL (ref 8.3–10.1)
CHLORIDE SERPL-SCNC: 97 MMOL/L (ref 100–108)
CO2 SERPL-SCNC: 28 MMOL/L (ref 21–32)
CREAT SERPL-MCNC: 1.54 MG/DL (ref 0.6–1.3)
EOSINOPHIL # BLD AUTO: 0.36 THOUSAND/ΜL (ref 0–0.61)
EOSINOPHIL NFR BLD AUTO: 5 % (ref 0–6)
ERYTHROCYTE [DISTWIDTH] IN BLOOD BY AUTOMATED COUNT: 14.8 % (ref 11.6–15.1)
GFR SERPL CREATININE-BSD FRML MDRD: 41 ML/MIN/1.73SQ M
GLUCOSE SERPL-MCNC: 121 MG/DL (ref 65–140)
HCT VFR BLD AUTO: 39 % (ref 36.5–49.3)
HGB BLD-MCNC: 12.4 G/DL (ref 12–17)
LYMPHOCYTES # BLD AUTO: 0.95 THOUSANDS/ΜL (ref 0.6–4.47)
LYMPHOCYTES NFR BLD AUTO: 12 % (ref 14–44)
MCH RBC QN AUTO: 27.4 PG (ref 26.8–34.3)
MCHC RBC AUTO-ENTMCNC: 31.8 G/DL (ref 31.4–37.4)
MCV RBC AUTO: 86 FL (ref 82–98)
MONOCYTES # BLD AUTO: 0.77 THOUSAND/ΜL (ref 0.17–1.22)
MONOCYTES NFR BLD AUTO: 10 % (ref 4–12)
NEUTROPHILS # BLD AUTO: 5.64 THOUSANDS/ΜL (ref 1.85–7.62)
NEUTS SEG NFR BLD AUTO: 73 % (ref 43–75)
PLATELET # BLD AUTO: 267 THOUSANDS/UL (ref 149–390)
PMV BLD AUTO: 9.9 FL (ref 8.9–12.7)
POTASSIUM SERPL-SCNC: 5 MMOL/L (ref 3.5–5.3)
RBC # BLD AUTO: 4.53 MILLION/UL (ref 3.88–5.62)
SODIUM SERPL-SCNC: 133 MMOL/L (ref 136–145)
WBC # BLD AUTO: 7.73 THOUSAND/UL (ref 4.31–10.16)

## 2018-06-17 PROCEDURE — 80048 BASIC METABOLIC PNL TOTAL CA: CPT | Performed by: INTERNAL MEDICINE

## 2018-06-17 PROCEDURE — 85025 COMPLETE CBC W/AUTO DIFF WBC: CPT | Performed by: INTERNAL MEDICINE

## 2018-06-17 PROCEDURE — 99232 SBSQ HOSP IP/OBS MODERATE 35: CPT | Performed by: INTERNAL MEDICINE

## 2018-06-17 RX ORDER — TORSEMIDE 10 MG/1
10 TABLET ORAL DAILY
Status: DISCONTINUED | OUTPATIENT
Start: 2018-06-17 | End: 2018-06-18 | Stop reason: HOSPADM

## 2018-06-17 RX ORDER — TORSEMIDE 10 MG/1
10 TABLET ORAL DAILY
Status: DISCONTINUED | OUTPATIENT
Start: 2018-06-17 | End: 2018-06-17

## 2018-06-17 RX ORDER — ASPIRIN 81 MG/1
81 TABLET, CHEWABLE ORAL DAILY
Status: DISCONTINUED | OUTPATIENT
Start: 2018-06-17 | End: 2018-06-18 | Stop reason: HOSPADM

## 2018-06-17 RX ADMIN — SENNOSIDES AND DOCUSATE SODIUM 2 TABLET: 8.6; 5 TABLET ORAL at 21:25

## 2018-06-17 RX ADMIN — TORSEMIDE 10 MG: 10 TABLET ORAL at 10:09

## 2018-06-17 RX ADMIN — APIXABAN 2.5 MG: 2.5 TABLET, FILM COATED ORAL at 17:43

## 2018-06-17 RX ADMIN — OXYBUTYNIN CHLORIDE 5 MG: 5 TABLET ORAL at 17:43

## 2018-06-17 RX ADMIN — DIGOXIN 125 MCG: 125 TABLET ORAL at 08:13

## 2018-06-17 RX ADMIN — ASPIRIN 81 MG: 81 TABLET, CHEWABLE ORAL at 10:11

## 2018-06-17 RX ADMIN — POLYETHYLENE GLYCOL 3350 17 G: 17 POWDER, FOR SOLUTION ORAL at 17:43

## 2018-06-17 RX ADMIN — POLYETHYLENE GLYCOL 3350 17 G: 17 POWDER, FOR SOLUTION ORAL at 08:13

## 2018-06-17 RX ADMIN — ESCITALOPRAM OXALATE 10 MG: 10 TABLET ORAL at 08:14

## 2018-06-17 RX ADMIN — TAMSULOSIN HYDROCHLORIDE 0.4 MG: 0.4 CAPSULE ORAL at 17:43

## 2018-06-17 RX ADMIN — OXYBUTYNIN CHLORIDE 5 MG: 5 TABLET ORAL at 08:13

## 2018-06-17 RX ADMIN — PANTOPRAZOLE SODIUM 40 MG: 40 TABLET, DELAYED RELEASE ORAL at 05:21

## 2018-06-17 RX ADMIN — APIXABAN 2.5 MG: 2.5 TABLET, FILM COATED ORAL at 10:11

## 2018-06-17 RX ADMIN — METOPROLOL TARTRATE 100 MG: 100 TABLET ORAL at 21:25

## 2018-06-17 NOTE — ASSESSMENT & PLAN NOTE
· Recommend aggressive bowel regimen   Pt had large BM yesterday  · Providing adequate bowel regimen at dc would help with urinary retention

## 2018-06-17 NOTE — ASSESSMENT & PLAN NOTE
· Patient on anticoagulation and aspirin presented with acute urinary retention and now after Pacheco placement is noted to have gross hematuria  · Continue to hold eliquis and aspirin for now, Hgb has remained stable  · Urology following, input appreciated  · Continue CBI per their discretion  Urine appears clear yellow today w/o clots  · Monitor serial H&H and transfuse if needed  · Urine cx negative despite abnormal findings ? cystitis on CT scan  No need for abx

## 2018-06-17 NOTE — ASSESSMENT & PLAN NOTE
· Chronic AFib on eliquis, no history of stroke--needs to be held due to hematuria as per above  · Continue digoxin and Lopressor  · Known to Dr Maribel Montenegro as outpatient

## 2018-06-17 NOTE — PROGRESS NOTES
Admitted with hematuria  Full urology consult 6/16  CBI now off and urine edilia to yellow  Back on Eliquis and ASA  Maintain coulter to gravity today  Continue to flush coulter q 4 hr with 60 cc NSS or prn  Continue tamsulosin  OK for void trial in AM 6/18/18 if urine remains clear  If patient cannot void, he can be DC'd home with a coulter with outpt f/u

## 2018-06-17 NOTE — PROGRESS NOTES
Progress Note Dwayne Carmona 1935, 80 y o  male MRN: [id number]    Unit/Bed#: -01 Encounter: [id number]    Primary Care Provider: Isabella Doyle DO   Date and time admitted to hospital: 6/15/2018  9:04 AM     Addendum, 9:11 a m :  Discussed with Urology physician  Since urine is clear today, will clamp CBI and maintain Pacheco to drainage  It is okay to restart blood thinners  Continue to flush q 4 hours per their orders  Tomorrow, as long as urine clear we will DC Pacheco for a voiding trial     * Gross hematuria   Assessment & Plan    · Patient on anticoagulation and aspirin presented with acute urinary retention and now after Pacheco placement is noted to have gross hematuria  · Continue to hold eliquis and aspirin for now, Hgb has remained stable  · Urology following, input appreciated  · Continue CBI per their discretion  Urine appears clear yellow today w/o clots  · Monitor serial H&H and transfuse if needed  · Urine cx negative despite abnormal findings ? cystitis on CT scan  No need for abx  Urinary retention   Assessment & Plan    · Possibly due to prostate enlargement verses constipation, both noted on CT scan  · Maintain Pacheco for now  · Added Flomax on admission  · Appreciate urology input         Atrial fibrillation ([address] )   Assessment & Plan    · Chronic AFib on eliquis, no history of stroke--needs to be held due to hematuria as per above  · Continue digoxin and Lopressor  · Known to Dr Sally Schroeder as outpatient         Constipation   Assessment & Plan    · Recommend aggressive bowel regimen  Pt had large BM yesterday  · Providing adequate bowel regimen at dc would help with urinary retention         Essential hypertension   Assessment & Plan    · Pressures stable  Currently off his ace-inhibitor         Hyponatremia   Assessment & Plan    · 135 on admission, 133 today  Patient appears relatively euvolemic   Will restart diuretics today and monitor         Depression   Assessment & Plan    · Continue lexapro         Chronic systolic CHF (congestive heart failure) (HCC)   Assessment & Plan    · 2D echocardiogram in February 2018 showed ejection fraction 45%  · Patient reported sense of being very tired and some slight difficulty breathing over the past couple days but currently does not appear to be volume overloaded  · Monitor input/output closely  · Resume diuretic today        Acute kidney injury superimposed on chronic kidney disease (HCC)   Assessment & Plan    · Baseline creatinine 1 4-1 5, noted to be 1 84 on admission and improved today to 1 54  · This could be due to obstructive uropathy and may continue to improve now that Pacheco is in place  · Would recommend continue holding ACE-inhibitor today, resume diuretic          VTE Pharmacologic Prophylaxis:   Pharmacologic: Pharmacologic VTE Prophylaxis contraindicated due to Gross hematuria  Mechanical VTE Prophylaxis in Place: Yes    Patient Centered Rounds: I have performed bedside rounds with nursing staff today  Jillian Lennon RN    Discussions with Specialists or Other Care Team Provider:  [address] Urology input  Education and Discussions with Family / Patient:  Patient  I updated the patient's daughter-in-law, sherman via phone  All questions and concerns answered to the best of my ability  Time Spent for Care: 30 minutes  More than 50% of total time spent on counseling and coordination of care as described above  Current Length of Stay: 2 day(s)    Current Patient Status: Inpatient   Certification Statement: The patient will continue to require additional inpatient hospital stay due to As above    Discharge Plan:  Potential 24 hours? Need clearance by Urology and plan for Pacheco catheter    Code Status: Level 3 - DNAR and DNI      Subjective:   Patient reports feeling better today  His urine has cleared up nicely  He denies any abdominal pain  He had a bowel movement yesterday and is tolerating a diet    Denies any shortness of breath  He does not think his leg edema is any worse than usual   He is asking about going home  Objective:     Vitals:   Temp (24hrs), Av 8 °F (37 1 °C), Min:98 5 °F (36 9 °C), Max:99 2 °F (37 3 °C)    HR:  [] 103  Resp:  [18] 18  BP: (101-150)/(64-72) 101/67  SpO2:  [92 %-97 %] 93 %  Body mass index is 33 53 kg/m²  Input and Output Summary (last 24 hours): Intake/Output Summary (Last 24 hours) at 18 09  Last data filed at 18 0518   Gross per 24 hour   Intake              260 ml   Output             2950 ml   Net            -2690 ml       Physical Exam:     Physical Exam   Constitutional: He is oriented to person, place, and time  No distress  Cardiovascular: Normal rate  An irregularly irregular rhythm present  No murmur heard  Pulmonary/Chest: No respiratory distress  He has no wheezes  He has no rales  Abdominal: Soft  Bowel sounds are normal  He exhibits no distension  There is no tenderness  Genitourinary:   Genitourinary Comments: Pacheco bag with clear yellow urine, no clots appreciated    Musculoskeletal: He exhibits edema (trace)  Neurological: He is alert and oriented to person, place, and time  Skin: He is not diaphoretic  Psychiatric: He has a normal mood and affect  Nursing note and vitals reviewed        Additional Data:     Labs:      Results from last 7 days  Lab Units 18  0513   WBC Thousand/uL 7 73   HEMOGLOBIN g/dL 12 4   HEMATOCRIT % 39 0   PLATELETS Thousands/uL 267   NEUTROS PCT % 73   LYMPHS PCT % 12*   MONOS PCT % 10   EOS PCT % 5       Results from last 7 days  Lab Units 18  0513  06/15/18  0939   SODIUM mmol/L 133*  < > 135*   POTASSIUM mmol/L 5 0  < > 4 7   CHLORIDE mmol/L 97*  < > 99*   CO2 mmol/L 28  < > 25   BUN mg/dL 27*  < > 30*   CREATININE mg/dL 1 54*  < > 1 84*   CALCIUM mg/dL 9 3  < > 8 9   TOTAL PROTEIN g/dL  --   --  8 0   BILIRUBIN TOTAL mg/dL  --   --  0 80   ALK PHOS U/L  --   --  72   ALT U/L  --   --  29 AST U/L  --   --  30   GLUCOSE RANDOM mg/dL 121  < > 126   < > = values in this interval not displayed  Results from last 7 days  Lab Units 06/15/18  0939   INR  1 18*       * I Have Reviewed All Lab Data Listed Above  * Additional Pertinent Lab Tests Reviewed: All Labs Within Last 24 Hours Reviewed    Imaging:    Imaging Reports Reviewed Today Include: none  Imaging Personally Reviewed by Myself Includes:  none    Recent Cultures (last 7 days):       Results from last 7 days  Lab Units 06/15/18  1615   URINE CULTURE  No Growth <1000 cfu/mL       Last 24 Hours Medication List:     Current Facility-Administered Medications:  belladonna-opium 1 suppository Rectal Q6H PRN Ami Ledbetter, KATE   bisacodyl 10 mg Rectal Once Jennifer Ruelas, KATE   digoxin 125 mcg Oral Daily Ami Ledbetter, KATE   escitalopram 10 mg Oral Daily Ami Ledbetter, KATE   metoprolol tartrate 100 mg Oral Q12H [facility] Ami Ledbetter, KATE   ondansetron 4 mg Intravenous Q4H PRN Ami Ledbetter, KATE   oxybutynin 5 mg Oral BID Ami Ledbetter, KATE   pantoprazole 40 mg Oral Early Morning Ami Ledbetter, KATE   polyethylene glycol 17 g Oral BID Ami Ledbetter, KATE   senna-docusate sodium 2 tablet Oral HS Ami Ledbetter, PA-ONEL   tamsulosin 0 4 mg Oral Daily With [employer], KATE   torsemide 10 mg Oral Daily Chrissy Trevino PA-C        Today, Patient Was Seen By: Chrissy Trevino PA-C    ** Please Note: Dictation voice to text software may have been used in the creation of this document   **

## 2018-06-17 NOTE — ASSESSMENT & PLAN NOTE
· 135 on admission, 133 today  Patient appears relatively euvolemic   Will restart diuretics today and monitor

## 2018-06-17 NOTE — ASSESSMENT & PLAN NOTE
· 2D echocardiogram in February 2018 showed ejection fraction 45%  · Patient reported sense of being very tired and some slight difficulty breathing over the past couple days but currently does not appear to be volume overloaded  · Monitor input/output closely  · Resume diuretic today

## 2018-06-17 NOTE — ASSESSMENT & PLAN NOTE
· Baseline creatinine 1 4-1 5, noted to be 1 84 on admission and improved today to 1 54  · This could be due to obstructive uropathy and may continue to improve now that Pacheco is in place  · Would recommend continue holding ACE-inhibitor today, resume diuretic

## 2018-06-18 ENCOUNTER — TELEPHONE (OUTPATIENT)
Dept: OTHER | Facility: HOSPITAL | Age: 83
End: 2018-06-18

## 2018-06-18 VITALS
WEIGHT: 233.69 LBS | SYSTOLIC BLOOD PRESSURE: 126 MMHG | HEIGHT: 70 IN | OXYGEN SATURATION: 95 % | RESPIRATION RATE: 18 BRPM | HEART RATE: 92 BPM | BODY MASS INDEX: 33.46 KG/M2 | DIASTOLIC BLOOD PRESSURE: 59 MMHG | TEMPERATURE: 98.4 F

## 2018-06-18 LAB
BASOPHILS # BLD AUTO: 0.02 THOUSANDS/ΜL (ref 0–0.1)
BASOPHILS NFR BLD AUTO: 0 % (ref 0–1)
EOSINOPHIL # BLD AUTO: 0.55 THOUSAND/ΜL (ref 0–0.61)
EOSINOPHIL NFR BLD AUTO: 7 % (ref 0–6)
ERYTHROCYTE [DISTWIDTH] IN BLOOD BY AUTOMATED COUNT: 14.8 % (ref 11.6–15.1)
HCT VFR BLD AUTO: 38 % (ref 36.5–49.3)
HGB BLD-MCNC: 12 G/DL (ref 12–17)
LYMPHOCYTES # BLD AUTO: 1.11 THOUSANDS/ΜL (ref 0.6–4.47)
LYMPHOCYTES NFR BLD AUTO: 13 % (ref 14–44)
MCH RBC QN AUTO: 27.4 PG (ref 26.8–34.3)
MCHC RBC AUTO-ENTMCNC: 31.6 G/DL (ref 31.4–37.4)
MCV RBC AUTO: 87 FL (ref 82–98)
MONOCYTES # BLD AUTO: 0.85 THOUSAND/ΜL (ref 0.17–1.22)
MONOCYTES NFR BLD AUTO: 10 % (ref 4–12)
NEUTROPHILS # BLD AUTO: 5.77 THOUSANDS/ΜL (ref 1.85–7.62)
NEUTS SEG NFR BLD AUTO: 70 % (ref 43–75)
PLATELET # BLD AUTO: 262 THOUSANDS/UL (ref 149–390)
PMV BLD AUTO: 10.1 FL (ref 8.9–12.7)
RBC # BLD AUTO: 4.38 MILLION/UL (ref 3.88–5.62)
WBC # BLD AUTO: 8.3 THOUSAND/UL (ref 4.31–10.16)

## 2018-06-18 PROCEDURE — 99239 HOSP IP/OBS DSCHRG MGMT >30: CPT | Performed by: INTERNAL MEDICINE

## 2018-06-18 PROCEDURE — 99232 SBSQ HOSP IP/OBS MODERATE 35: CPT | Performed by: NURSE PRACTITIONER

## 2018-06-18 PROCEDURE — 85025 COMPLETE CBC W/AUTO DIFF WBC: CPT | Performed by: INTERNAL MEDICINE

## 2018-06-18 RX ORDER — BISACODYL 10 MG
10 SUPPOSITORY, RECTAL RECTAL ONCE
Qty: 12 SUPPOSITORY | Refills: 0
Start: 2018-06-18 | End: 2019-05-07

## 2018-06-18 RX ORDER — POLYETHYLENE GLYCOL 3350 17 G/17G
17 POWDER, FOR SOLUTION ORAL DAILY
Qty: 14 EACH | Refills: 0
Start: 2018-06-18

## 2018-06-18 RX ORDER — TAMSULOSIN HYDROCHLORIDE 0.4 MG/1
0.4 CAPSULE ORAL
Qty: 30 CAPSULE | Refills: 0 | Status: SHIPPED | OUTPATIENT
Start: 2018-06-18 | End: 2018-07-26

## 2018-06-18 RX ADMIN — ASPIRIN 81 MG: 81 TABLET, CHEWABLE ORAL at 08:14

## 2018-06-18 RX ADMIN — METOPROLOL TARTRATE 100 MG: 100 TABLET ORAL at 08:14

## 2018-06-18 RX ADMIN — TORSEMIDE 10 MG: 10 TABLET ORAL at 08:14

## 2018-06-18 RX ADMIN — PANTOPRAZOLE SODIUM 40 MG: 40 TABLET, DELAYED RELEASE ORAL at 05:15

## 2018-06-18 RX ADMIN — DIGOXIN 125 MCG: 125 TABLET ORAL at 08:14

## 2018-06-18 RX ADMIN — APIXABAN 2.5 MG: 2.5 TABLET, FILM COATED ORAL at 08:14

## 2018-06-18 RX ADMIN — POLYETHYLENE GLYCOL 3350 17 G: 17 POWDER, FOR SOLUTION ORAL at 08:13

## 2018-06-18 RX ADMIN — ESCITALOPRAM OXALATE 10 MG: 10 TABLET ORAL at 08:13

## 2018-06-18 RX ADMIN — OXYBUTYNIN CHLORIDE 5 MG: 5 TABLET ORAL at 08:14

## 2018-06-18 NOTE — ASSESSMENT & PLAN NOTE
· Chronic AFib on eliquis, no history of stroke   Eliquis resumed yesterday after dw urology   · Continue digoxin and Lopressor  · Known to Dr Virginia Mccray as outpatient

## 2018-06-18 NOTE — ASSESSMENT & PLAN NOTE
· Baseline creatinine 1 4-1 5, noted to be 1 84 on admission and improved yesterday to 1 54  · ? partially related to obstructive uropathy on admission     · Would recommend continue holding ACE-inhibitor today, diuretic resumed yesterday

## 2018-06-18 NOTE — DISCHARGE INSTRUCTIONS
Coulter Cath Care instructions---Maintain coulter catheter to straight drainage  May use leg bag and shower  May flush daily prn using Libertad syringe and 120 ml NSS  May use more saline ad aspen to prevent/treat cath obstruction  Remove catheter on 3rd day post-hospital discharge by 0800 hrs  (If on weekend, wait until next business day)  Straight cath if no void or less than 200ml in by next AM & repeat process  If patient requires straight cath x2days, re-insert coulter and call for Urologist follow-up  Information above  Coulter can remain in place for up to 4 weeks at a time  Coulter placed at [facility] 15, 2018    Constipation   WHAT YOU NEED TO KNOW:   Constipation is when you have hard, dry bowel movements, or you go longer than usual between bowel movements  DISCHARGE INSTRUCTIONS:   Return to the emergency department if:   · You have blood in your bowel movements  · You have a fever and abdominal pain with the constipation  Contact your healthcare provider if:   · Your constipation gets worse  · You start to vomit  · You have questions or concerns about your condition or care  Medicines:   · Medicine or a fiber supplement  may help make your bowel movement softer  A laxative may help relax and loosen your intestines to help you have a bowel movement  You may also be given medicine to increase fluid in your intestines  The fluid may help move bowel movements through your intestines  · Take your medicine as directed  Contact your healthcare provider if you think your medicine is not helping or if you have side effects  Tell him of her if you are allergic to any medicine  Keep a list of the medicines, vitamins, and herbs you take  Include the amounts, and when and why you take them  Bring the list or the pill bottles to follow-up visits  Carry your medicine list with you in case of an emergency  Manage your constipation:   · Drink liquids as directed    You may need to drink extra liquids to help soften and move your bowels  Ask how much liquid to drink each day and which liquids are best for you  · Eat high-fiber foods  This may help decrease constipation by adding bulk to your bowel movements  High-fiber foods include fruit, vegetables, whole-grain breads and cereals, and beans  Your healthcare provider or dietitian can help you create a high-fiber meal plan  · Exercise regularly  Regular physical activity can help stimulate your intestines  Ask which exercises are best for you  · Schedule a time each day to have a bowel movement  This may help train your body to have regular bowel movements  Bend forward while you are on the toilet to help move the bowel movement out  Sit on the toilet for at least 10 minutes, even if you do not have a bowel movement  Follow up with your healthcare provider as directed:  Write down your questions so you remember to ask them during your visits  © 2017 [employer] Information is for End User's use only and may not be sold, redistributed or otherwise used for commercial purposes  All illustrations and images included in CareNotes® are the copyrighted property of [employer]  or [employer]  The above information is an  only  It is not intended as medical advice for individual conditions or treatments  Talk to your doctor, nurse or pharmacist before following any medical regimen to see if it is safe and effective for you  Urinary Retention in Men   WHAT YOU NEED TO KNOW:   Urinary retention is a condition that develops when your bladder does not empty completely when you urinate  DISCHARGE INSTRUCTIONS:   Medicines:   · Medicines  can help decrease the size of your prostate, fight infection, and help you urinate more easily  · Take your medicine as directed  Contact your healthcare provider if you think your medicine is not helping or if you have side effects   Tell him or her if you are allergic to any medicine  Keep a list of the medicines, vitamins, and herbs you take  Include the amounts, and when and why you take them  Bring the list or the pill bottles to follow-up visits  Carry your medicine list with you in case of an emergency  Pacheco catheter care: You may need a Pacheco catheter for up to 2 weeks at home  Healthcare providers will give you a smaller leg bag to collect urine  Keep the bag below your waist  This will prevent urine from flowing back into your bladder and causing an infection or other problems  Also, keep the tube free of kinks so the urine will drain properly  Do not pull on the catheter  This can cause pain and bleeding, and may cause the catheter to come out  Ask your healthcare provider or urologist for more information on Pacheco catheter care  Urinate regularly:  When your catheter is removed, do not let your bladder become too full before you urinate  Set regular times each day to urinate  Urinate as soon as you feel the need or at least every 3 hours while you are awake  Do not drink liquids before you go to bed  Urinate right before you go to bed  Follow up with your healthcare provider or urologist as directed:  Write down your questions so you remember to ask them during your visits  Contact your healthcare provider or urologist if:   · You have a fever  · You have pain when you urinate  · You have blood in your urine  · You have problems with your catheter  · You have questions or concerns about your condition or care  Return to the emergency department if:   · You have severe abdominal pain  · You are breathing faster than usual     · Your heartbeat is faster than usual     · Your face, hands, feet, or ankles are swollen  © 2017 [employer]  Information is for End User's use only and may not be sold, redistributed or otherwise used for commercial purposes   All illustrations and images included in CareNotes® are the copyrighted property of [employer]  or [employer]  The above information is an  only  It is not intended as medical advice for individual conditions or treatments  Talk to your doctor, nurse or pharmacist before following any medical regimen to see if it is safe and effective for you  Pacheco Catheter Placement and Care   WHAT YOU NEED TO KNOW:   A Pacheco catheter is a sterile tube that is inserted into your bladder to drain urine  It is also called an indwelling urinary catheter  The tip of the catheter has a small balloon filled with solution that holds the catheter inside your bladder  DISCHARGE INSTRUCTIONS:   Seek care immediately if:   · Your catheter comes out  · You suddenly have material that looks like sand in the tubing or drainage bag  · No urine is draining into the bag and you have checked the system  · You have pain in your hip, back, pelvis, or lower abdomen  · You are confused or cannot think clearly  Contact your healthcare provider if:   · You have a fever  · You have bladder spasms for more than 1 day after the catheter is placed  · You see blood in the tubing or drainage bag  · You have a rash or itching where the catheter tube is secured to your skin  · Urine leaks from or around the catheter, tubing, or drainage bag  · The closed drainage system has accidently come open or apart  · You see a layer of crystals inside the tubing  · You have questions or concerns about your condition or care  Care for your Pacheco catheter:   · Clean your genital area 2 times every day  Clean your catheter and the area around where it was inserted  Use soap and water  Clean your anal opening and catheter area after every bowel movement  · Secure the catheter tube  so you do not pull or move the catheter  This helps prevent pain and bladder spasms   Healthcare providers will show you how to use medical tape or a strap to secure the catheter tube to your body  · Keep a closed drainage system  Your Pacheco catheter should always be attached to the drainage bag to form a closed system  Do not disconnect any part of the closed system unless you need to change the bag  Care for your drainage bag:   · Ask if a leg bag is right for you  A leg bag can be worn under your clothes  Ask your healthcare provider for more information about a leg bag  · Keep the drainage bag below the level of your waist   This helps stop urine from moving back up the tubing and into your bladder  Do not loop or kink the tubing  This can cause urine to back up and collect in your bladder  Do not let the drainage bag touch or lie on the floor  · Empty the drainage bag when needed  The weight of a full drainage bag can be painful  Empty the drainage bag every 3 to 6 hours or when it is ? full  · Clean and change the drainage bag as directed  Ask your healthcare provider how often you should change the drainage bag and what cleaning solution to use  Wear disposable gloves when you change the bag  Do not allow the end of the catheter or tubing to touch anything  Clean the ends with an alcohol pad before you reconnect them  What to do if problems develop:   · No urine is draining into the bag:      ¨ Check for kinks in the tubing and straighten them out  ¨ Check the tape or strap used to secure the catheter tube to your skin  Make sure it is not blocking the tube  ¨ Make sure you are not sitting or lying on the tubing  ¨ Make sure the urine bag is hanging below the level of your waist     · Urine leaks from or around the catheter, tubing, or drainage bag:  Check if the closed drainage system has accidently come open or apart  Clean the catheter and tubing ends with a new alcohol pad and reconnect them  Prevent an infection:   · Wash your hands often  Wash before and after you touch your catheter, tubing, or drainage bag  Use soap and water   Wear clean disposable gloves when you care for your catheter or disconnect the drainage bag  Wash your hands before you prepare or eat food  · Drink liquids as directed  Ask your healthcare provider how much liquid to drink each day and which liquids are best for you  Liquids will help flush your kidneys and bladder to help prevent infection  Follow up with your healthcare provider as directed:  Write down your questions so you remember to ask them during your visits  © 2017 [employer] Information is for End User's use only and may not be sold, redistributed or otherwise used for commercial purposes  All illustrations and images included in CareNotes® are the copyrighted property of [employer]  or [employer]  The above information is an  only  It is not intended as medical advice for individual conditions or treatments  Talk to your doctor, nurse or pharmacist before following any medical regimen to see if it is safe and effective for you  Urinary Leg Bag   WHAT YOU NEED TO KNOW:   What is a urinary leg bag? A urinary leg bag holds urine that drains from your catheter  It fits under your clothes and allows you to do your normal daily activities  How do I use a urinary leg bag? · Wash your hands  before and after you touch your catheter, tubing, or drainage bag  Use soap and water  This reduces the risk of infection  · Strap your leg bag to your thigh or calf  Make sure the straps are comfortable  The straps can cause problems with blood flow in your leg if they are too tight  · Clean the tip of the drainage tube with alcohol  before attaching it to your catheter  This helps prevent bacteria from getting into your catheter  · The connecting tube should not pull on your catheter  Skin breakdown can occur if there is constant pulling on the catheter  · Check the tube often to make sure it is not kinked or twisted    Blockage in the tube can cause urine to back up into your bladder  Your urine must flow straight through the tube into your leg bag  · Always keep the leg bag below your bladder  This prevents urine from the bag going back into your bladder, which may cause an infection  · Empty your leg bag when it is ½ full, or every 3 hours  A full bag may break or disconnect from the catheter  · Change to your bedside bag before you go to bed  Your bedside bag can hold more urine  Do not use your leg bag at night because it could become too full or break  · Clean your leg bag after every use  Fill the bag with 2 parts vinegar and 3 parts water  Let it soak for 20 minutes, then rinse and let dry  Follow your healthcare provider's instruction on replacing your leg bag with a new one  CARE AGREEMENT:   You have the right to help plan your care  Learn about your health condition and how it may be treated  Discuss treatment options with your caregivers to decide what care you want to receive  You always have the right to refuse treatment  The above information is an  only  It is not intended as medical advice for individual conditions or treatments  Talk to your doctor, nurse or pharmacist before following any medical regimen to see if it is safe and effective for you  © 2017 [employer]  Information is for End User's use only and may not be sold, redistributed or otherwise used for commercial purposes  All illustrations and images included in CareNotes® are the copyrighted property of [employer] M [employer] Inc  or [facility]

## 2018-06-18 NOTE — TELEPHONE ENCOUNTER
Mr Man Fitzpatrick is an 51-year-old male seen in consultation by Dr Quentin Palafox while on call regarding gross hematuria  Patient will be discharged by Internal Medicine June 18, 2018 with Pacheco catheter in place and void trial with visiting nurses services  Patient will require hospital follow-up in 2 weeks preferably at [facility]  Patient is not known to our group  Please contact patient/caregiver with hospital follow-up appointment date and time  Thank you

## 2018-06-18 NOTE — PLAN OF CARE
Problem: DISCHARGE PLANNING - CARE MANAGEMENT  Goal: Discharge to post-acute care or home with appropriate resources  INTERVENTIONS:  - Conduct assessment to determine patient/family and health care team treatment goals, and need for post-acute services based on payer coverage, community resources, and patient preferences, and barriers to discharge  - Address psychosocial, clinical, and financial barriers to discharge as identified in assessment in conjunction with the patient/family and health care team  - Arrange appropriate level of post-acute services according to patients   needs and preference and payer coverage in collaboration with the physician and health care team  - Communicate with and update the patient/family, physician, and health care team regarding progress on the discharge plan  - Arrange appropriate transportation to post-acute venues  Outcome: Progressing  LOS 3  Patient is a bundle patient and is not a readmission  Spoke with patient at bedside  Patient lives in a one level house in [place] with his daughter-in-law  Patient has 1 WILFRED  Patient states, "it has been getting harder to get in home " Patient uses a rollator and has 2 canes, showerchair and BSC in garage  Patient has assistance with ADLs with [facility] of [address]  Spoke with patient emergency contact Ursula Smith via telephone  She stated patient receives a total of 5 hrs of personal care services  Agency comes to the home on Tuesday and Thursdays and helps patient with bathing, cleaning, cooking, and shopping  Patient has no history of inpatient rehab  Patient uses [facility] pharmacy in [facility] and has access to pay for his medications  Denies any history of mental illness, drugs/alcohol abuse  Patient does not have an advance directive/living will  Denies information at this time   Patient states, "i have a sticker on my fridge that says DNR/DNI " Patient is retired and daughter-in-law drives to appointments  Discussed with patient and daughter-in-law re:DCP  Patient and daughter-in-law are interested in VNA  No preference noted  CM will make referral for [facility] and contact [facility] [employer]  CM will continue to f/u  CM reviewed discharge planning process including the following: identifying caregivers at home, preference for d/c planning needs, [facility] Meds to Bed program, availability of treatment team to discuss questions or concerns patient and/or family may have regarding diagnosis, plan of care, old or new medications and discharge planning   CM will continue to follow for care coordination and update assessment as necessary  CM name and role reviewed and Discharge Checklist provided  Encouraged patient and caregiver to review prior to discharge

## 2018-06-18 NOTE — SOCIAL WORK
[facility] will accept patient  Messaged them with update the patient will be leaving today and to d/c coulter catheter in 48hrs if urine is clear per urology

## 2018-06-18 NOTE — ASSESSMENT & PLAN NOTE
· Possibly due to prostate enlargement verses constipation, both noted on CT scan  · Maintain Pacheco for now  · Added Flomax on admission  · Appreciate urology input

## 2018-06-18 NOTE — PROGRESS NOTES
Progress Note - Shanelle Marin 80 y o  male MRN: [id number]    Unit/Bed#: -01 Encounter: [id number]      Assessment:  Mr Sarita Bansal is an 80-year-old male admitted by Internal Medicine team for chief complaint of new onset urinary retention and gross hematuria patient chronically anticoagulated for history of atrial fibrillation  Eliquis is on hold during admission for aggressive bladder irrigation; now resumed  Received call From Internal Medicine team regarding transient gross hematuria  Nursing staff intermittently resume CB I   Upon evaluation, urine was grossly clear yellow  Plan:  Maintain urinary catheter to straight drainage  Cap 3rd lumen of three-way Pacheco  Discharge with Pacheco catheter in place  Home health nurses will remove catheter in 48 hr if urine is clear  Office will contact patient with office follow-up  No further  intervention indicated during this hospital stay  Please do not hesitate to contact our office with any questions or new  concerns      Subjective:   Denies fever, chills, flank, testicular groin pain  Objective:     Vitals: Blood pressure 126/59, pulse 92, temperature 98 4 °F (36 9 °C), temperature source Oral, resp  rate 18, height 5' 10" (1 778 m), weight 106 kg (233 lb 11 oz), SpO2 95 %  ,Body mass index is 33 53 kg/m²        Intake/Output Summary (Last 24 hours) at 06/18/18 1213  Last data filed at 06/18/18 1001   Gross per 24 hour   Intake              660 ml   Output             1700 ml   Net            -1040 ml       Physical Exam: General appearance: alert and oriented, in no acute distress  Head: Normocephalic, without obvious abnormality, atraumatic  Neck: no adenopathy, no carotid bruit, no JVD, supple, symmetrical, trachea midline and thyroid not enlarged, symmetric, no tenderness/mass/nodules  Lungs: clear to auscultation bilaterally  Heart: regularly irregular rhythm  Abdomen: soft, non-tender; bowel sounds normal; no masses,  no organomegaly  Extremities: extremities normal, warm and well-perfused; no cyanosis, clubbing, or edema  Pulses: 2+ and symmetric  Neurologic: Grossly normal  Pacheco patent for grossly clear yellow urine     Invasive Devices     Peripheral Intravenous Line            Peripheral IV 06/15/18 Right Antecubital 2 days          Drain            Urethral Catheter Three way 18 Fr  2 days              Lab Results   Component Value Date    WBC 8 30 06/18/2018    HGB 12 0 06/18/2018    HCT 38 0 06/18/2018    MCV 87 06/18/2018     06/18/2018       Lab Results   Component Value Date    GLUCOSE 121 06/17/2018    CALCIUM 9 3 06/17/2018     (L) 06/17/2018    K 5 0 06/17/2018    CO2 28 06/17/2018    CL 97 (L) 06/17/2018    BUN 27 (H) 06/17/2018    CREATININE 1 54 (H) 06/17/2018       Lab, Imaging and other studies: I have personally reviewed pertinent reports

## 2018-06-18 NOTE — ASSESSMENT & PLAN NOTE
· 2D echocardiogram in February 2018 showed ejection fraction 45%  · Monitor input/output closely  · Continue diuretic

## 2018-06-18 NOTE — ASSESSMENT & PLAN NOTE
· Possibly due to prostate enlargement verses constipation, both noted on CT scan  · Maintain Pacheco for now, see plan under initial problem above   · Added Flomax on admission  · Appreciate urology input

## 2018-06-18 NOTE — ASSESSMENT & PLAN NOTE
· Recommend aggressive bowel regimen   Pt having normal BMs at this time   · Providing adequate bowel regimen at dc would help with urinary retention

## 2018-06-18 NOTE — SOCIAL WORK
LOS 3  Patient is a bundle patient and is not a readmission  Spoke with patient at bedside  Patient lives in a one level house in [place] with his daughter-in-law  Patient has 1 WILFRED  Patient states, "it has been getting harder to get in home " Patient uses a rollator and has 2 canes, showerchair and BSC in garage  Patient has assistance with ADLs with [facility] of [address]  Spoke with patient emergency contact Vina Fleischer via telephone  She stated patient receives a total of 5 hrs of personal care services  Agency comes to the home on Tuesday and Thursdays and helps patient with bathing, cleaning, cooking, and shopping  Patient has no history of inpatient rehab  Patient uses [facility] pharmacy in [place] and has access to pay for his medications  Denies any history of mental illness, drugs/alcohol abuse  Patient does not have an advance directive/living will  Denies information at this time  Patient states, "i have a sticker on my fridge that says DNR/DNI " Patient is retired and daughter-in-law drives to appointments  Discussed with patient and daughter-in-law re:DCP  Patient and daughter-in-law are interested in VNA  No preference noted  CM will make referral for [facility] and contact [facility] of [address]  CM will continue to f/u  CM reviewed discharge planning process including the following: identifying caregivers at home, preference for d/c planning needs, [facility] Meds to Bed program, availability of treatment team to discuss questions or concerns patient and/or family may have regarding diagnosis, plan of care, old or new medications and discharge planning   CM will continue to follow for care coordination and update assessment as necessary  CM name and role reviewed and Discharge Checklist provided  Encouraged patient and caregiver to review prior to discharge

## 2018-06-18 NOTE — ASSESSMENT & PLAN NOTE
· Chronic AFib on eliquis, no history of stroke   Eliquis resumed yesterday after dw urology   · Continue digoxin and Lopressor  · Known to Dr Merary Marie as outpatient

## 2018-06-18 NOTE — SOCIAL WORK
CM called and left a message with Tessa Fong from [facility]  Waiting for call back  Referral placed for [facility]  Discussed with patient that he is a bundle  Copy of form given  All questions/concerns answered  CM reviewed the bundle program with patient and the following: "As part of your Medicare benefit, you are automatically enrolled in the bundle payment program  (Here is a notice from Medicare that you may keep and read)   The program is designed to assist in coordinating your care after you leave the hospital  A nurse from [place] [id number] will be following you for 90 days  Please anticipate a phone call from the nurse within 48hrs of your discharge    As your Care Manager, I will be providing a handoff to your next level of care to ensure a safe transition "

## 2018-06-18 NOTE — PROGRESS NOTES
Progress Note Jerson Allen 1935, 80 y o  male MRN: [id number]    Unit/Bed#: -01 Encounter: [id number]    Primary Care Provider: Michael Bustamante DO   Date and time admitted to hospital: 6/15/2018  9:04 AM  * Gross hematuria   Assessment & Plan    · Patient on anticoagulation and aspirin presented with acute urinary retention and after coulter placement was noted to have gross hematuria  Was on CBI over weekend, d/c yesterday given clear urine  · Plan per urology was to restart eliquis/asa yesterday and maintain coulter  · If urine clear, dc coulter for void trial this AM  · Urine is cola colored with some sediment in it this AM, d/w urology  Going to hook up CBI again for a few hours to rinse bladder as this is likely old blood  They will re-evaluate later today  · Hgb has remained stable  · Urine cx negative despite abnormal findings ? cystitis on CT scan  No need for abx  Urinary retention   Assessment & Plan    · Possibly due to prostate enlargement verses constipation, both noted on CT scan  · Maintain Coulter for now  · Added Flomax on admission  · Appreciate urology input         Atrial fibrillation [facility])   Assessment & Plan    · Chronic AFib on eliquis, no history of stroke  Eliquis resumed yesterday after dw urology   · Continue digoxin and Lopressor  · Known to Dr Maribel Montenegro as outpatient         Constipation   Assessment & Plan    · Recommend aggressive bowel regimen  Pt having normal BMs at this time   · Providing adequate bowel regimen at dc would help with urinary retention         Essential hypertension   Assessment & Plan    · Pressures stable  Currently off his ace-inhibitor         Hyponatremia   Assessment & Plan    · 135 on admission, 133 yesterday   Diuretic resumed yesterday  · F/u AM labs         Depression   Assessment & Plan    · Continue lexapro         Chronic systolic CHF (congestive heart failure) (HCC)   Assessment & Plan    · 2D echocardiogram in February 2018 showed ejection fraction 45%  · Monitor input/output closely  · Resumed diuretic today        Acute kidney injury superimposed on chronic kidney disease (HCC)   Assessment & Plan    · Baseline creatinine 1 4-1 5, noted to be 1 84 on admission and improved yesterday to 1 54  · ? partially related to obstructive uropathy on admission  · Would recommend continue holding ACE-inhibitor today, diuretic resumed yesterday          VTE Pharmacologic Prophylaxis:   Pharmacologic: Apixaban (Eliquis)  Mechanical VTE Prophylaxis in Place: Yes    Patient Centered Rounds: I have performed bedside rounds with nursing staff today  Discussions with Specialists or Other Care Team Provider: jenise Brown of urology  Education and Discussions with Family / Patient: patient  Will update daughter in law later today  Time Spent for Care: 30 minutes  More than 50% of total time spent on counseling and coordination of care as described above  Current Length of Stay: 3 day(s)    Current Patient Status: Inpatient   Certification Statement: The patient will continue to require additional inpatient hospital stay due to as above     Discharge Plan: pending urology plan    Code Status: Level 3 - DNAR and DNI      Subjective:   Pt reports feeling okay today  Denies abd/suprapubic pain  Urine a bit darker this AM per urology, no gross hematuria noted  Had BM yesterday  No SOB/CP  Objective:     Vitals:   Temp (24hrs), Av 5 °F (36 9 °C), Min:98 4 °F (36 9 °C), Max:98 8 °F (37 1 °C)    HR:  [] 92  Resp:  [18] 18  BP: (120-140)/(56-65) 126/59  SpO2:  [95 %-99 %] 95 %  Body mass index is 33 53 kg/m²  Input and Output Summary (last 24 hours): Intake/Output Summary (Last 24 hours) at 18 0908  Last data filed at 18 0752   Gross per 24 hour   Intake              660 ml   Output             3000 ml   Net            -2340 ml       Physical Exam:     Physical Exam   Constitutional: No distress     Cardiovascular: Normal rate  An irregularly irregular rhythm present  Pulmonary/Chest: He has no wheezes  He has no rales  Abdominal: Soft  Bowel sounds are normal  He exhibits distension  There is no tenderness  Genitourinary:   Genitourinary Comments: Pacheco noted to have cola colored urine in bag with some small sediment noted  No gross hematuria  Musculoskeletal: He exhibits edema  Skin: He is not diaphoretic  Psychiatric: He has a normal mood and affect  Nursing note and vitals reviewed  Additional Data:     Labs:      Results from last 7 days  Lab Units 06/18/18  0519   WBC Thousand/uL 8 30   HEMOGLOBIN g/dL 12 0   HEMATOCRIT % 38 0   PLATELETS Thousands/uL 262   NEUTROS PCT % 70   LYMPHS PCT % 13*   MONOS PCT % 10   EOS PCT % 7*       Results from last 7 days  Lab Units 06/17/18  0513  06/15/18  0939   SODIUM mmol/L 133*  < > 135*   POTASSIUM mmol/L 5 0  < > 4 7   CHLORIDE mmol/L 97*  < > 99*   CO2 mmol/L 28  < > 25   BUN mg/dL 27*  < > 30*   CREATININE mg/dL 1 54*  < > 1 84*   CALCIUM mg/dL 9 3  < > 8 9   TOTAL PROTEIN g/dL  --   --  8 0   BILIRUBIN TOTAL mg/dL  --   --  0 80   ALK PHOS U/L  --   --  72   ALT U/L  --   --  29   AST U/L  --   --  30   GLUCOSE RANDOM mg/dL 121  < > 126   < > = values in this interval not displayed  Results from last 7 days  Lab Units 06/15/18  0939   INR  1 18*       * I Have Reviewed All Lab Data Listed Above  * Additional Pertinent Lab Tests Reviewed:  All Labs Within Last 24 Hours Reviewed    Imaging:    Imaging Reports Reviewed Today Include: none  Imaging Personally Reviewed by Myself Includes:  none    Recent Cultures (last 7 days):       Results from last 7 days  Lab Units 06/15/18  1615   URINE CULTURE  No Growth <1000 cfu/mL       Last 24 Hours Medication List:     Current Facility-Administered Medications:  apixaban 2 5 mg Oral BID Kelly Osorio PA-C   aspirin 81 mg Oral Daily Elena Espinosa PA-C   belladonna-opium 1 suppository Rectal Q6H JORDYN Mueller Khloe, KATE   bisacodyl 10 mg Rectal Once Estefani Baugh, KATE   digoxin 125 mcg Oral Daily Ami Ledbetter PA-C   escitalopram 10 mg Oral Daily Ami Ledbetter, KATE   metoprolol tartrate 100 mg Oral Q12H [address] Ami Ledbetter, KATE   ondansetron 4 mg Intravenous Q4H PRN Ami Ledbetter PA-C   oxybutynin 5 mg Oral BID Ami Ledbetter PA-C   pantoprazole 40 mg Oral Early Morning Ami Ledbetter, KATE   polyethylene glycol 17 g Oral BID Ami Ledbetter PA-C   senna-docusate sodium 2 tablet Oral HS Ami Ledbetter PA-C   tamsulosin 0 4 mg Oral Daily With [employer], KATE   torsemide 10 mg Oral Daily Kemi Webber PA-C        Today, Patient Was Seen By: Kemi Webber PA-C    ** Please Note: Dictation voice to text software may have been used in the creation of this document   **

## 2018-06-18 NOTE — ASSESSMENT & PLAN NOTE
· 2D echocardiogram in February 2018 showed ejection fraction 45%  · Monitor input/output closely  · Resumed diuretic today

## 2018-06-18 NOTE — DISCHARGE SUMMARY
Discharge- Gretchen Earnest 1935, 80 y o  male MRN: [id number]    Unit/Bed#: -01 Encounter: [id number]    Primary Care Provider: Laura Friedman DO   Date and time admitted to hospital: 6/15/2018  9:04 AM      * Gross hematuria   Assessment & Plan    · Patient on anticoagulation and aspirin presented with acute urinary retention and after coulter placement was noted to have gross hematuria  Was on CBI over weekend, d/c yesterday given clear urine  · Plan per urology was to restart eliquis/asa yesterday and maintain coulter  · Urine is cola colored with some sediment in it this AM, d/w urology  Got a bit more CBI which has cleared up urine  · Plan per Urology: DC with coulter  [facility]NA arranged to do home voiding trial in 48 hours if urine clear  If not, will need to maintain coulter until office appointment which urology is arranging   · Hgb has remained stable  · Urine cx negative despite abnormal findings ? cystitis on CT scan  No need for abx  Urinary retention   Assessment & Plan    · Possibly due to prostate enlargement verses constipation, both noted on CT scan  · Maintain Coulter for now, see plan under initial problem above   · Added Flomax on admission  · Appreciate urology input         Atrial fibrillation [facility])   Assessment & Plan    · Chronic AFib on eliquis, no history of stroke  Eliquis resumed yesterday after dw urology   · Continue digoxin and Lopressor  · Known to Dr Delonte Tavares as outpatient         Constipation   Assessment & Plan    · Recommend aggressive bowel regimen  Pt having normal BMs at this time   · Providing adequate bowel regimen at dc would help with urinary retention         Essential hypertension   Assessment & Plan    · Pressures stable  Resume home medications         Hyponatremia   Assessment & Plan    · 135 on admission, 133 yesterday   Diuretic resumed yesterday  · OP f/u        Depression   Assessment & Plan    · Continue lexapro         Chronic systolic CHF (congestive heart failure) [facility])   Assessment & Plan    · 2D echocardiogram in February 2018 showed ejection fraction 45%  · Monitor input/output closely  · Continue diuretic         Acute kidney injury superimposed on chronic kidney disease ([address] )   Assessment & Plan    · Baseline creatinine 1 4-1 5, noted to be 1 84 on admission and improved yesterday to 1 54  · ? partially related to obstructive uropathy on admission  · Would recommend continuing ace-inhibtor and diureic           Discharging Physician / Practitioner: Divya Farley PA-C  PCP: Harmony Davila DO  Admission Date:   Admission Orders     Ordered        06/15/18 1344  Inpatient Admission (expected length of stay for this patient is greater than two midnights)  Once             Discharge Date: 06/18/18    Resolved Problems  Date Reviewed: 6/18/2018    None          Consultations During Hospital Stay:  · Urology    Procedures Performed:     · Pacheco cath insertion: 6/15/18  · Urine cx: no growth  · CT A/P wo contrast:   · Perivesicular inflammatory stranding suggesting cystitis  Large volume of rectal stool  Cholelithiasis  Prostatomegaly  Significant Findings / Test Results:     · Gross hematuria, Hgb remained stable    Incidental Findings:   · See above     Test Results Pending at Discharge (will require follow up):   · none     Outpatient Tests Requested:  · F/u urology  · F/u PCP    Complications:  none    Reason for Admission: Urinary retention    Hospital Course:     Anupama Lugo is a 80 y o  male patient with past medical history of AFib on Eliquis, essential hypertension, depression, chronic systolic heart failure with ejection fraction of 45%, CKD with baseline creatinine 1 4-1 5 who originally presented to the hospital on 6/15/2018 due to inability to pass urine  Patient also was complaining of constipation for at least 3 days  Patient's daughter-in-law contacted patient's PCP who advised patient to come to the ER    Patient states that he was supposed to see Urology after having some urinary retention issues during a prior hospitalization, but various things have limited the ability to have that appointment  Patient had Pacheco catheter placed in the ER initially with pink urine, eventually turned to dark bloody urine  Patient denied any suprapubic pain, fevers or chills  CT scan showed possible cystitis as well as constipation and prostatomegaly  He was started on Flomax and a bowel regimen  Urology was consulted  Patient underwent continuous bladder irrigation which improved his hematuria  Eventually he was stable to begin his aspirin and Eliquis again  Urology recommended that patient be discharged with Pacheco in place, and have a voiding trial with visiting Nurse [facility] in about 48 hours as long as his urine is clear  If it is not clear, he will need to follow up in the office for a void trial   Either way, he will be contacted by the urology office for a follow-up appointment  He should continue the Flomax in the meantime  Daughter-in-law was made aware of all discharge plans  Please see above list of diagnoses and related plan for additional information  Condition at Discharge: stable     Discharge Day Visit / Exam:     * Please refer to separate progress note for these details *    Discussion with Family:  Discussed with patient's daughter-in-law over the phone    Discharge instructions/Information to patient and family:   See after visit summary for information provided to patient and family  Provisions for Follow-Up Care:  See after visit summary for information related to follow-up care and any pertinent home health orders        Disposition:     Home with VNA Services (Reminder: Complete face to face encounter)    For Discharges to [facility] SNF:   · Not Applicable to this Patient - Not Applicable to this Patient    Planned Readmission: no     Discharge Statement:  I spent 45 minutes discharging the patient  This time was spent on the day of discharge  I had direct contact with the patient on the day of discharge  Greater than 50% of the total time was spent examining patient, answering all patient questions, arranging and discussing plan of care with patient as well as directly providing post-discharge instructions  Additional time then spent on discharge activities  Discharge Medications:  See after visit summary for reconciled discharge medications provided to patient and family        ** Please Note: This note has been constructed using a voice recognition system **

## 2018-06-18 NOTE — PHYSICIAN ADVISOR
Current patient class: Inpatient  The patient is currently on Hospital Day: 4 at [facility]      The patient was admitted to the hospital at [phone] on 6/15/18 for the following diagnosis:  Urinary retention [R33 9]  Constipation [K59 00]  Gross hematuria [R31 0]       There is documentation in the medical record of an expected length of stay of at least 2 midnights  The patient is therefore expected to satisfy the 2 midnight benchmark and given the 2 midnight presumption is appropriate for INPATIENT ADMISSION  Given this expectation of a satisfying stay, CMS instructs us that the patient is most often appropriate for inpatient admission under part A provided medical necessity is documented in the chart  After review of the relevant documentation, labs, vital signs and test results, the patient is appropriate for INPATIENT ADMISSION  Admission to the hospital as an inpatient is a complex decision making process which requires the practitioner to consider the patients presenting complaint, history and physical examination and all relevant testing  With this in mind, in this case, the patient was deemed appropriate for INPATIENT ADMISSION  After review of the documentation and testing available at the time of the admission I concur with this clinical determination of medical necessity  Rationale is as follows: The patient is an 40-year-old male who was admitted to the hospital on Melody 15, 2018 after noting the inability to void  He had a urinary catheter placed in the emergency department and had gross hematuria  The patient is on chronic anticoagulation with Eliquis, along with aspirin  Continuous bladder irrigation was initiated and urology consulted  The patient has now been hospitalized for three midnights  Notes indicate that CBI was discontinued yesterday however will require restart today as current urine shows evidence of old blood    Over the course of the stay, he was also treated for acute kidney injury  He has underlying chronic heart failure and multiple comorbidities  He is at increased risk for adverse outcome  Inpatient level of care remains appropriate given the need for several days of acute hospital management with continuous bladder irrigation, fluid balance  The patients vitals on arrival were ED Triage Vitals   Temperature Pulse Respirations Blood Pressure SpO2   06/15/18 0907 06/15/18 0907 06/15/18 0907 06/15/18 0907 06/15/18 0907   99 1 °F (37 3 °C) 103 18 138/92 96 %      Temp Source Heart Rate Source Patient Position - Orthostatic VS BP Location FiO2 (%)   06/15/18 0907 06/15/18 0907 06/15/18 0907 06/15/18 0907 --   Oral Monitor Sitting Right arm       Pain Score       06/15/18 1056       No Pain           Past Medical History:   Diagnosis Date    Atrial fibrillation (HCC)     Hypertension     Macular degeneration      Past Surgical History:   Procedure Laterality Date    ESOPHAGOGASTRODUODENOSCOPY N/A 2/15/2018    Procedure: ESOPHAGOGASTRODUODENOSCOPY (EGD);   Surgeon: Afsaneh Blanchard MD;  [facility];  Service: Gastroenterology           Consults have been placed to:   IP CONSULT TO UROLOGY    Vitals:    06/17/18 1748 06/17/18 2125 06/17/18 2210 06/18/18 0752   BP:  140/65 122/64 126/59   BP Location:   Left arm Right arm   Pulse: 88 63 103 92   Resp:   18 18   Temp:   98 8 °F (37 1 °C) 98 4 °F (36 9 °C)   TempSrc:   Oral Oral   SpO2:   99% 95%   Weight:       Height:           Most recent labs:    Recent Labs      06/17/18   0513  06/18/18   0519   WBC  7 73  8 30   HGB  12 4  12 0   HCT  39 0  38 0   PLT  267  262   K  5 0   --    NA  133*   --    CALCIUM  9 3   --    BUN  27*   --    CREATININE  1 54*   --        Scheduled Meds:  Current Facility-Administered Medications:  apixaban 2 5 mg Oral BID [employer]   aspirin 81 mg Oral Daily Elena Espinosa PA-C   belladonna-opium 1 suppository Rectal Q6H PRN Timur Mukherjee PA-C bisacodyl 10 mg Rectal Once Andradeavel Diaz, KATE   digoxin 125 mcg Oral Daily Ami Ledbetter, KATE   escitalopram 10 mg Oral Daily Ami Ledbetter, KATE   metoprolol tartrate 100 mg Oral Q12H [address] Ami Ledbetter, KATE   ondansetron 4 mg Intravenous Q4H PRN Ami Ledbetter, KATE   oxybutynin 5 mg Oral BID Ami Ledbetter PA-C   pantoprazole 40 mg Oral Early Morning Ami Ledbetter, KATE   polyethylene glycol 17 g Oral BID Ami Ledbetter, KATE   senna-docusate sodium 2 tablet Oral HS Ami Ledbetter PA-C   tamsulosin 0 4 mg Oral Daily With [employer], KATE   torsemide 10 mg Oral Daily Elena Espinosa, KATE     Continuous Infusions:   PRN Meds: belladonna-opium    ondansetron    Surgical procedures (if appropriate):

## 2018-06-18 NOTE — ASSESSMENT & PLAN NOTE
· Patient on anticoagulation and aspirin presented with acute urinary retention and after coulter placement was noted to have gross hematuria  Was on CBI over weekend, d/c yesterday given clear urine  · Plan per urology was to restart eliquis/asa yesterday and maintain coulter  · Urine is cola colored with some sediment in it this AM, d/w urology  Got a bit more CBI which has cleared up urine  · Plan per Urology: DC with coulter  [facility] arranged to do home voiding trial in 48 hours if urine clear  If not, will need to maintain coulter until office appointment which urology is arranging   · Hgb has remained stable  · Urine cx negative despite abnormal findings ? cystitis on CT scan  No need for abx

## 2018-06-18 NOTE — ASSESSMENT & PLAN NOTE
· Baseline creatinine 1 4-1 5, noted to be 1 84 on admission and improved yesterday to 1 54  · ? partially related to obstructive uropathy on admission     · Would recommend continuing ace-inhibtor and diureic

## 2018-06-18 NOTE — ASSESSMENT & PLAN NOTE
· Patient on anticoagulation and aspirin presented with acute urinary retention and after coulter placement was noted to have gross hematuria  Was on CBI over weekend, d/c yesterday given clear urine  · Plan per urology was to restart eliquis/asa yesterday and maintain coulter  · If urine clear, dc coulter for void trial this AM  · Urine is cola colored with some sediment in it this AM, d/w urology  Going to hook up CBI again for a few hours to rinse bladder as this is likely old blood  They will re-evaluate later today  · Hgb has remained stable  · Urine cx negative despite abnormal findings ? cystitis on CT scan  No need for abx

## 2018-07-03 ENCOUNTER — OFFICE VISIT (OUTPATIENT)
Dept: UROLOGY | Facility: CLINIC | Age: 83
End: 2018-07-03
Payer: MEDICARE

## 2018-07-03 VITALS
WEIGHT: 230 LBS | HEIGHT: 70 IN | SYSTOLIC BLOOD PRESSURE: 112 MMHG | HEART RATE: 72 BPM | BODY MASS INDEX: 32.93 KG/M2 | DIASTOLIC BLOOD PRESSURE: 78 MMHG

## 2018-07-03 DIAGNOSIS — R35.0 URINARY FREQUENCY: Primary | ICD-10-CM

## 2018-07-03 LAB
BACTERIA UR QL AUTO: NORMAL /HPF
BILIRUB UR QL STRIP: NEGATIVE
CLARITY UR: CLEAR
COLOR UR: YELLOW
GLUCOSE UR STRIP-MCNC: NEGATIVE MG/DL
HGB UR QL STRIP.AUTO: NEGATIVE
HYALINE CASTS #/AREA URNS LPF: NORMAL /LPF
KETONES UR STRIP-MCNC: NEGATIVE MG/DL
LEUKOCYTE ESTERASE UR QL STRIP: NEGATIVE
NITRITE UR QL STRIP: NEGATIVE
NON-SQ EPI CELLS URNS QL MICRO: NORMAL /HPF
PH UR STRIP.AUTO: 6 [PH] (ref 4.5–8)
PROT UR STRIP-MCNC: NEGATIVE MG/DL
RBC #/AREA URNS AUTO: NORMAL /HPF
SL AMB  POCT GLUCOSE, UA: NORMAL
SL AMB LEUKOCYTE ESTERASE,UA: NORMAL
SL AMB POCT BILIRUBIN,UA: NORMAL
SL AMB POCT BLOOD,UA: NORMAL
SL AMB POCT CLARITY,UA: CLEAR
SL AMB POCT COLOR,UA: YELLOW
SL AMB POCT KETONES,UA: NORMAL
SL AMB POCT NITRITE,UA: NORMAL
SL AMB POCT PH,UA: 6
SL AMB POCT SPECIFIC GRAVITY,UA: NORMAL
SL AMB POCT URINE PROTEIN: NORMAL
SL AMB POCT UROBILINOGEN: NORMAL
SP GR UR STRIP.AUTO: 1.01 (ref 1–1.03)
UROBILINOGEN UR QL STRIP.AUTO: 0.2 E.U./DL
WBC #/AREA URNS AUTO: NORMAL /HPF

## 2018-07-03 PROCEDURE — 99213 OFFICE O/P EST LOW 20 MIN: CPT | Performed by: PHYSICIAN ASSISTANT

## 2018-07-03 PROCEDURE — 88112 CYTOPATH CELL ENHANCE TECH: CPT | Performed by: PATHOLOGY

## 2018-07-03 PROCEDURE — 81002 URINALYSIS NONAUTO W/O SCOPE: CPT | Performed by: PHYSICIAN ASSISTANT

## 2018-07-03 PROCEDURE — 81001 URINALYSIS AUTO W/SCOPE: CPT | Performed by: PHYSICIAN ASSISTANT

## 2018-07-03 RX ORDER — APIXABAN 5 MG/1
5 TABLET, FILM COATED ORAL 2 TIMES DAILY
Refills: 2 | COMMUNITY
Start: 2018-05-15 | End: 2018-08-02 | Stop reason: SDUPTHER

## 2018-07-03 NOTE — PROGRESS NOTES
1  Urinary frequency  POCT urine dip    Urinalysis with microscopic    Cytology, urine         Assessment and plan:       1  Gross hematuria and resolved urinary retention  -I reviewed with the patient and his daughter that his CT was negative for any upper tract lesions  Given his gross hematuria recommendations are for a cystoscopy to complete hematuria evaluation  Will send his urine today for urine cytology  This will be scheduled in the near future   -patient is demonstrating adequate bladder emptying at this time  Encouraged him to remain well hydrated and avoid constipation  -patient resides in the [facility] area and would like his cystoscopy at our [place] [address] location  Ignacio Govea PA-C      [facility] follow up hematuria    History of Present Illness     Rosemary Rich is a 80 y o  patient of Dr Jeneal Ahumada with a history urinary retention and gross hematuria presenting for hospital follow-up  Patient had been admitted to [facility] in June 2018  He had new onset urinary retention, constipation, and gross hematuria  Gross hematuria precipitated with catheter placement  Patient was on Eliquis for atrial fibrillation  Patient required extensive hand irrigation and continuous bladder irrigation  Patient had a CT of the abdomen and pelvis without contrast in June 2018  There is no evidence hydronephrosis or upper tract lesions  Bladder was  unremarkable does decides perivesical stranding suggesting cystitis at that time  Patient was discharged with Pacheco catheter in place  Patient was to undergo a voiding trial with visiting nursing services  Patient's catheter was removed with visiting nurse and states he has been urinating excellently since  Postvoid residual in the office today reveals 0-10 mL  Patient denies any dysuria, gross hematuria, suprapubic pressure, flank pain, fevers, chills    Patient is blind however he does not believe he has any hematuria  Urine in the office today is yellow without hematuria      Laboratory     Lab Results   Component Value Date    CREATININE 1 54 (H) 06/17/2018       Review of Systems     Review of Systems   Constitutional: Negative for activity change, appetite change, chills, diaphoresis, fatigue, fever and unexpected weight change  Respiratory: Negative for chest tightness and shortness of breath  Cardiovascular: Negative for chest pain, palpitations and leg swelling  Gastrointestinal: Negative for abdominal distention, abdominal pain, constipation, diarrhea, nausea and vomiting  Genitourinary: Negative for decreased urine volume, difficulty urinating, dysuria, enuresis, flank pain, frequency, genital sores, hematuria and urgency  Musculoskeletal: Negative for back pain, gait problem and myalgias  Skin: Negative for color change, pallor, rash and wound  Psychiatric/Behavioral: Negative for behavioral problems  The patient is not nervous/anxious  Allergies     No Known Allergies    Physical Exam     Physical Exam   Constitutional: He is oriented to person, place, and time  He appears well-developed and well-nourished  No distress  HENT:   Head: Normocephalic and atraumatic    blind   Eyes: Conjunctivae are normal    Neck: Normal range of motion  No tracheal deviation present  Pulmonary/Chest: Effort normal    Abdominal:   overweight   Musculoskeletal: Normal range of motion  He exhibits no edema or deformity  Ambulates with walking stick   Neurological: He is alert and oriented to person, place, and time  Skin: Skin is warm and dry  No rash noted  He is not diaphoretic  No erythema  No pallor  Psychiatric: He has a normal mood and affect   His behavior is normal          Vital Signs     Vitals:    07/03/18 1320   BP: 112/78   BP Location: Left arm   Patient Position: Sitting   Cuff Size: Adult   Pulse: 72   Weight: 104 kg (230 lb)   Height: 5' 10" (1 778 m)         Current Medications       Current Outpatient Prescriptions:     apixaban (ELIQUIS) 2 5 mg, Take 1 tablet (2 5 mg total) by mouth 2 (two) times a day, Disp: 60 tablet, Rfl: 0    aspirin (ECOTRIN LOW STRENGTH) 81 mg EC tablet, Take by mouth, Disp: , Rfl:     digoxin (LANOXIN) 0 125 mg tablet, Take 125 mcg by mouth daily, Disp: , Rfl: 3    ELIQUIS 5 MG, Take 5 mg by mouth 2 (two) times a day, Disp: , Rfl: 2    escitalopram (LEXAPRO) 10 mg tablet, Take 1 tablet (10 mg total) by mouth daily, Disp: 30 tablet, Rfl: 0    lisinopril (ZESTRIL) 2 5 mg tablet, Take 2 5 mg by mouth daily, Disp: , Rfl: 3    loratadine (CLARITIN) 10 mg tablet, Take by mouth daily as needed  , Disp: , Rfl:     metoprolol tartrate (LOPRESSOR) 100 mg tablet, Take 1 tablet (100 mg total) by mouth every 12 (twelve) hours (Patient taking differently: Take 50 mg by mouth every 12 (twelve) hours  ), Disp: 60 tablet, Rfl: 0    oxybutynin (DITROPAN) 5 mg tablet, Take 1 tablet by mouth 2 (two) times a day, Disp: , Rfl:     pantoprazole (PROTONIX) 40 mg tablet, Take 1 tablet (40 mg total) by mouth daily in the early morning, Disp: 90 tablet, Rfl: 2    polyethylene glycol (MIRALAX) 17 g packet, Take 17 g by mouth daily, Disp: 14 each, Rfl: 0    tamsulosin (FLOMAX) 0 4 mg, Take 1 capsule (0 4 mg total) by mouth daily with dinner, Disp: 30 capsule, Rfl: 0    torsemide (DEMADEX) 10 mg tablet, Take 1 tablet (10 mg total) by mouth daily, Disp: 30 tablet, Rfl: 0    bisacodyl (DULCOLAX) 10 mg suppository, Insert 1 suppository (10 mg total) into the rectum once for 1 dose, Disp: 12 suppository, Rfl: 0      Active Problems     Patient Active Problem List   Diagnosis    Acute kidney injury superimposed on chronic kidney disease (HCC)    Atrial fibrillation (HCC)    Chronic systolic CHF (congestive heart failure) (HCC)    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    Depression    Hyponatremia    Azotemia    Arthritis    Legally blind    Peripheral neuropathy    NICM (nonischemic cardiomyopathy) [facility] [address] [facility]    Other proteinuria    Essential hypertension    Gross hematuria    Urinary retention    Constipation         Past Medical History     Past Medical History:   Diagnosis Date    [facility]     Hypertension     Macular degeneration          Surgical History     Past Surgical History:   Procedure Laterality Date    ESOPHAGOGASTRODUODENOSCOPY N/A 2/15/2018    Procedure: ESOPHAGOGASTRODUODENOSCOPY (EGD); Surgeon: Soledad Dukes MD;  [facility];  Service: Gastroenterology         Family History     Family History   Problem Relation Age of Onset    No Known Problems Mother          Social History     Social History       Radiology     CT ABDOMEN AND PELVIS WITHOUT IV CONTRAST     INDICATION:   Abdominal pain      COMPARISON: None      TECHNIQUE:  CT examination of the abdomen and pelvis was performed without intravenous contrast   Axial, sagittal, and coronal 2D reformatted images were created from the source data and submitted for interpretation       Radiation dose length product (DLP) for this visit:  1112 mGy-cm   This examination, like all CT scans performed in the [facility], was performed utilizing techniques to minimize radiation dose exposure, including the use of iterative   reconstruction and automated exposure control       Enteric contrast was administered       FINDINGS:     ABDOMEN     LOWER CHEST:  Cardiomegaly is present      LIVER/BILIARY TREE:  Unremarkable      GALLBLADDER:  There are gallstone(s) within the gallbladder, without pericholecystic inflammatory changes      SPLEEN:  Unremarkable      PANCREAS:  Unremarkable      ADRENAL GLANDS:  Unremarkable      KIDNEYS/URETERS:  Unremarkable  No hydronephrosis      STOMACH AND BOWEL:  Large volume of rectal stool is present      APPENDIX:  No findings to suggest appendicitis      ABDOMINOPELVIC CAVITY:  No ascites or free intraperitoneal air   No lymphadenopathy      VESSELS:  Unremarkable for patient's age      PELVIS     REPRODUCTIVE ORGANS:  Prostatomegaly is present      URINARY BLADDER:  Pacheco catheter is present  Perivesicular stranding is identified suggesting cystitis      ABDOMINAL WALL/INGUINAL REGIONS:  Unremarkable      OSSEOUS STRUCTURES:  No acute fracture or destructive osseous lesion      IMPRESSION:     Perivesicular inflammatory stranding suggesting cystitis      Large volume of rectal stool      Cholelithiasis      Prostatomegaly

## 2018-07-26 ENCOUNTER — PROCEDURE VISIT (OUTPATIENT)
Dept: UROLOGY | Facility: CLINIC | Age: 83
End: 2018-07-26
Payer: MEDICARE

## 2018-07-26 VITALS
DIASTOLIC BLOOD PRESSURE: 80 MMHG | SYSTOLIC BLOOD PRESSURE: 148 MMHG | WEIGHT: 234.8 LBS | HEART RATE: 72 BPM | BODY MASS INDEX: 32.87 KG/M2 | HEIGHT: 71 IN

## 2018-07-26 DIAGNOSIS — Z87.448 HISTORY OF GROSS HEMATURIA: Primary | ICD-10-CM

## 2018-07-26 DIAGNOSIS — R31.0 GROSS HEMATURIA: ICD-10-CM

## 2018-07-26 DIAGNOSIS — N40.1 BENIGN PROSTATIC HYPERPLASIA WITH LOWER URINARY TRACT SYMPTOMS, SYMPTOM DETAILS UNSPECIFIED: ICD-10-CM

## 2018-07-26 LAB
SL AMB  POCT GLUCOSE, UA: ABNORMAL
SL AMB LEUKOCYTE ESTERASE,UA: ABNORMAL
SL AMB POCT BILIRUBIN,UA: ABNORMAL
SL AMB POCT BLOOD,UA: ABNORMAL
SL AMB POCT CLARITY,UA: CLEAR
SL AMB POCT COLOR,UA: YELLOW
SL AMB POCT KETONES,UA: ABNORMAL
SL AMB POCT NITRITE,UA: ABNORMAL
SL AMB POCT PH,UA: 6
SL AMB POCT SPECIFIC GRAVITY,UA: 1.01
SL AMB POCT URINE PROTEIN: ABNORMAL
SL AMB POCT UROBILINOGEN: ABNORMAL

## 2018-07-26 PROCEDURE — 52000 CYSTOURETHROSCOPY: CPT | Performed by: UROLOGY

## 2018-07-26 PROCEDURE — 81002 URINALYSIS NONAUTO W/O SCOPE: CPT | Performed by: UROLOGY

## 2018-07-26 RX ORDER — FINASTERIDE 5 MG/1
5 TABLET, FILM COATED ORAL DAILY
Qty: 90 TABLET | Refills: 0 | Status: SHIPPED | OUTPATIENT
Start: 2018-07-26 | End: 2018-10-26 | Stop reason: SDUPTHER

## 2018-07-26 NOTE — PROGRESS NOTES
Office Cystoscopy Procedure Note    Indication:    Hematuria    Informed consent   The risks, benefits, complications, treatment options, and expected outcomes were discussed with the patient  The patient concurred with the proposed plan and provided informed consent  Anesthesia  Lidocaine jelly 2%    Antibiotic prophylaxis   None    Procedure  The patient was placed in the supineposition, was prepped and draped in the usual manner using sterile technique, and 2% lidocaine jelly instilled into the urethra  A 17 F flexible cystoscope was then inserted into the urethra and the urethra and bladder carefully examined  The following findings were noted:    Findings:  Urethra:  Normal  Prostate:  Markedly lateral lobe hyperplasia with 100% kissing of the lateral lobes, a long and high prostatic fossa  The superficial blood vessels of the prostatic urothelium appeared very friable in superficial and are likely the etiology of his prior hematuria  Bladder:  Large capacity, grade 2-3 trabeculations throughout  No papillary tumors or erythematous patches  Ureteral orifices:  Normal  Other findings:  None     Specimens: None                 Complications:    None; patient tolerated the procedure well           Disposition: To home after 30 minute observation  Condition: Stable    Plan:   Patient is doing overall well from a voiding standpoint  He has now completed a full hematuria workup  Based upon the size of his prostate and the likely prostatic source was prior hematuria I recommended stopping the tamsulosin and switching to finasteride for long-term use  Dosing and adverse effects were reviewed with his daughter along the prescription was sent    We will see him back in 1 year

## 2018-08-02 ENCOUNTER — OFFICE VISIT (OUTPATIENT)
Dept: NEPHROLOGY | Facility: CLINIC | Age: 83
End: 2018-08-02
Payer: MEDICARE

## 2018-08-02 VITALS
BODY MASS INDEX: 33.5 KG/M2 | SYSTOLIC BLOOD PRESSURE: 112 MMHG | HEIGHT: 70 IN | WEIGHT: 234 LBS | DIASTOLIC BLOOD PRESSURE: 60 MMHG

## 2018-08-02 DIAGNOSIS — N18.30 CKD (CHRONIC KIDNEY DISEASE) STAGE 3, GFR 30-59 ML/MIN (HCC): ICD-10-CM

## 2018-08-02 DIAGNOSIS — I50.22 CHRONIC SYSTOLIC CHF (CONGESTIVE HEART FAILURE) (HCC): Primary | Chronic | ICD-10-CM

## 2018-08-02 DIAGNOSIS — E87.1 HYPONATREMIA: ICD-10-CM

## 2018-08-02 PROCEDURE — 99214 OFFICE O/P EST MOD 30 MIN: CPT | Performed by: INTERNAL MEDICINE

## 2018-08-02 NOTE — PROGRESS NOTES
NEPHROLOGY OFFICE VISIT   Dana Villar 80 y o  male MRN: [id number]  8/2/2018    Reason for Visit: CKD    ASSESSMENT and PLAN:    I had the pleasure of seeing Darci Damico today in the renal clinic for the continued management of CKD  Since our last visit, there has been no ER visits or hospitilizations  He currently has no complaints at this time and is feeling well  Patient denies any chest pain, shortness of breath and swelling  The last blood work was done on June 2018, which we have reviewed together  This is my 1st encounter with him  He was seen by our PA and nurse practitioner in the prior office visits  He currently has no complaints  He reports occasional dizziness upon standing too fast   He reports good appetite no nausea no vomiting no diarrhea no constipation  His sodium was found to be slightly low at 133 back in June and his torsemide was restarted  He was recently seen by Urology last week and underwent a cystoscopy      1 )  Chronic kidney disease stage III  -baseline creatinine appears to be between 1 5-1 8 mg/dL  -renal function from June appeared stable  -continue torsemide 10 mg daily  -continue lisinopril 2 5 mg daily  -avoid NSAIDs    2 )  Hyponatremia  -mild  -fluid restriction and torsemide  -check a repeat basic metabolic panel    3 )  Hypertension  -blood pressure under excellent control  -continue lisinopril 2 5 mg daily  -continue torsemide 10 mg daily  -continue metoprolol 50 mg every 12 hours    4 )  Gross hematuria/urinary retention/benign prostatic hypertrophy  -status post cystoscopy last week  -hematuria resolved  -no urinary symptoms  -following with Urology    5 )  Nonischemic cardiomyopathy  -does not appear to be in active failure      PATIENT INSTRUCTIONS:    Patient Instructions   1 )  Low 2 g sodium diet    2 )  Monitor weights at home    3 )  Avoid NSAIDs    4 )  Monitor blood pressure at home, call if blood pressure greater than 150/90 persistently    5 ) Repeat Blood work in 1-2 weeks and in 4 months          Orders Placed This Encounter   Procedures    Basic metabolic panel     This is a patient instruction: Patient fasting for 8 hours or longer recommended  Standing Status:   Future     Standing Expiration Date:   8/2/2019    Comprehensive metabolic panel     This is a patient instruction: Patient fasting for 8 hours or longer recommended  Standing Status:   Future     Standing Expiration Date:   8/2/2019    CBC     Standing Status:   Future     Standing Expiration Date:   8/2/2019    Vitamin D 25 hydroxy     Standing Status:   Future     Standing Expiration Date:   8/2/2019       OBJECTIVE:  Current Weight: Weight - Scale: 106 kg (234 lb)  Vitals:    08/02/18 1007   BP: 112/60   Weight: 106 kg (234 lb)   Height: 5' 10" (1 778 m)    Body mass index is 33 58 kg/m²  REVIEW OF SYSTEMS:    Review of Systems   Constitutional: Negative for activity change and fever  Respiratory: Negative for cough, chest tightness, shortness of breath and wheezing  Cardiovascular: Negative for chest pain and leg swelling  Gastrointestinal: Negative for abdominal pain, diarrhea, nausea and vomiting  Endocrine: Negative for polyuria  Genitourinary: Negative for difficulty urinating, dysuria, flank pain, frequency and urgency  Skin: Negative for rash  Neurological: Negative for dizziness, syncope, light-headedness and headaches  PHYSICAL EXAM:      Physical Exam   Constitutional: He is oriented to person, place, and time  He appears well-developed and well-nourished  No distress  HENT:   Head: Normocephalic and atraumatic  Eyes: Conjunctivae are normal  Pupils are equal, round, and reactive to light  No scleral icterus  Neck: Normal range of motion  Neck supple  Cardiovascular: Normal rate, regular rhythm, S1 normal, S2 normal and intact distal pulses  Exam reveals no gallop and no friction rub  No murmur heard    Pulmonary/Chest: Effort normal and breath sounds normal  No respiratory distress  He has no wheezes  He has no rales  Abdominal: Soft  Bowel sounds are normal  There is no tenderness  There is no rebound  Musculoskeletal: Normal range of motion  He exhibits no edema  Neurological: He is alert and oriented to person, place, and time  Skin: No rash noted  Psychiatric: He has a normal mood and affect  His behavior is normal    Nursing note and vitals reviewed        Medications:    Current Outpatient Prescriptions:     apixaban (ELIQUIS) 2 5 mg, Take 1 tablet (2 5 mg total) by mouth 2 (two) times a day, Disp: 60 tablet, Rfl: 0    aspirin (ECOTRIN LOW STRENGTH) 81 mg EC tablet, Take by mouth, Disp: , Rfl:     digoxin (LANOXIN) 0 125 mg tablet, Take 125 mcg by mouth daily, Disp: , Rfl: 3    escitalopram (LEXAPRO) 10 mg tablet, Take 1 tablet (10 mg total) by mouth daily, Disp: 30 tablet, Rfl: 0    finasteride (PROSCAR) 5 mg tablet, Take 1 tablet (5 mg total) by mouth daily, Disp: 90 tablet, Rfl: 0    lisinopril (ZESTRIL) 2 5 mg tablet, Take 2 5 mg by mouth daily, Disp: , Rfl: 3    loratadine (CLARITIN) 10 mg tablet, Take by mouth daily as needed  , Disp: , Rfl:     metoprolol tartrate (LOPRESSOR) 100 mg tablet, Take 1 tablet (100 mg total) by mouth every 12 (twelve) hours (Patient taking differently: Take 50 mg by mouth every 12 (twelve) hours  ), Disp: 60 tablet, Rfl: 0    oxybutynin (DITROPAN) 5 mg tablet, Take 1 tablet by mouth 2 (two) times a day, Disp: , Rfl:     pantoprazole (PROTONIX) 40 mg tablet, Take 1 tablet (40 mg total) by mouth daily in the early morning, Disp: 90 tablet, Rfl: 2    polyethylene glycol (MIRALAX) 17 g packet, Take 17 g by mouth daily (Patient taking differently: Take 17 g by mouth as needed  ), Disp: 14 each, Rfl: 0    torsemide (DEMADEX) 10 mg tablet, Take 1 tablet (10 mg total) by mouth daily, Disp: 30 tablet, Rfl: 0    bisacodyl (DULCOLAX) 10 mg suppository, Insert 1 suppository (10 mg total) into the rectum once for 1 dose, Disp: 12 suppository, Rfl: 0    Laboratory Results:        Results for orders placed or performed in visit on 07/26/18   POCT urine dip   Result Value Ref Range    LEUKOCYTE ESTERASE,UA small      NITRITE,UA neg     SL AMB POCT UROBILINOGEN neg     SL AMB POCT URINE PROTEIN neg      PH,UA 6 0      BLOOD,UA neg      SPECIFIC GRAVITY,UA 1 015      KETONES,UA neg      BILIRUBIN,UA neg     GLUCOSE, UA neg      COLOR,UA yellow      CLARITY,UA clear

## 2018-08-02 NOTE — PATIENT INSTRUCTIONS
1  )  Low 2 g sodium diet    2 )  Monitor weights at home    3 )  Avoid NSAIDs    4 )  Monitor blood pressure at home, call if blood pressure greater than 150/90 persistently    5 ) Repeat Blood work in 1-2 weeks and in 4 months

## 2018-08-08 ENCOUNTER — LAB REQUISITION (OUTPATIENT)
Dept: LAB | Facility: HOSPITAL | Age: 83
End: 2018-08-08
Payer: MEDICARE

## 2018-08-08 DIAGNOSIS — N18.30 CHRONIC KIDNEY DISEASE, STAGE III (MODERATE) (HCC): ICD-10-CM

## 2018-08-08 DIAGNOSIS — E87.1 HYPO-OSMOLALITY AND HYPONATREMIA: ICD-10-CM

## 2018-08-08 LAB
ALBUMIN SERPL BCP-MCNC: 3.9 G/DL (ref 3.5–5)
ALP SERPL-CCNC: 60 U/L (ref 46–116)
ALT SERPL W P-5'-P-CCNC: 28 U/L (ref 12–78)
ANION GAP SERPL CALCULATED.3IONS-SCNC: 14 MMOL/L (ref 4–13)
AST SERPL W P-5'-P-CCNC: 30 U/L (ref 5–45)
BASOPHILS # BLD AUTO: 0.05 THOUSANDS/ΜL (ref 0–0.1)
BASOPHILS NFR BLD AUTO: 1 % (ref 0–1)
BILIRUB SERPL-MCNC: 0.8 MG/DL (ref 0.2–1)
BUN SERPL-MCNC: 16 MG/DL (ref 5–25)
CALCIUM SERPL-MCNC: 9.1 MG/DL (ref 8.3–10.1)
CHLORIDE SERPL-SCNC: 100 MMOL/L (ref 100–108)
CO2 SERPL-SCNC: 30 MMOL/L (ref 21–32)
CREAT SERPL-MCNC: 1.5 MG/DL (ref 0.6–1.3)
EOSINOPHIL # BLD AUTO: 0.71 THOUSAND/ΜL (ref 0–0.61)
EOSINOPHIL NFR BLD AUTO: 9 % (ref 0–6)
ERYTHROCYTE [DISTWIDTH] IN BLOOD BY AUTOMATED COUNT: 17.6 % (ref 11.6–15.1)
GFR SERPL CREATININE-BSD FRML MDRD: 43 ML/MIN/1.73SQ M
GLUCOSE SERPL-MCNC: 67 MG/DL (ref 65–140)
HCT VFR BLD AUTO: 41.8 % (ref 36.5–49.3)
HGB BLD-MCNC: 12.4 G/DL (ref 12–17)
IMM GRANULOCYTES # BLD AUTO: 0.04 THOUSAND/UL (ref 0–0.2)
IMM GRANULOCYTES NFR BLD AUTO: 1 % (ref 0–2)
LYMPHOCYTES # BLD AUTO: 1 THOUSANDS/ΜL (ref 0.6–4.47)
LYMPHOCYTES NFR BLD AUTO: 12 % (ref 14–44)
MCH RBC QN AUTO: 28.4 PG (ref 26.8–34.3)
MCHC RBC AUTO-ENTMCNC: 29.7 G/DL (ref 31.4–37.4)
MCV RBC AUTO: 96 FL (ref 82–98)
MONOCYTES # BLD AUTO: 0.84 THOUSAND/ΜL (ref 0.17–1.22)
MONOCYTES NFR BLD AUTO: 10 % (ref 4–12)
NEUTROPHILS # BLD AUTO: 5.75 THOUSANDS/ΜL (ref 1.85–7.62)
NEUTS SEG NFR BLD AUTO: 67 % (ref 43–75)
NRBC BLD AUTO-RTO: 0 /100 WBCS
PLATELET # BLD AUTO: 273 THOUSANDS/UL (ref 149–390)
PMV BLD AUTO: 10.3 FL (ref 8.9–12.7)
POTASSIUM SERPL-SCNC: 4.3 MMOL/L (ref 3.5–5.3)
PROT SERPL-MCNC: 7.5 G/DL (ref 6.4–8.2)
RBC # BLD AUTO: 4.37 MILLION/UL (ref 3.88–5.62)
SODIUM SERPL-SCNC: 144 MMOL/L (ref 136–145)
WBC # BLD AUTO: 8.39 THOUSAND/UL (ref 4.31–10.16)

## 2018-08-08 PROCEDURE — 80053 COMPREHEN METABOLIC PANEL: CPT | Performed by: INTERNAL MEDICINE

## 2018-08-08 PROCEDURE — 82306 VITAMIN D 25 HYDROXY: CPT | Performed by: INTERNAL MEDICINE

## 2018-08-08 PROCEDURE — 85025 COMPLETE CBC W/AUTO DIFF WBC: CPT | Performed by: INTERNAL MEDICINE

## 2018-08-09 LAB — 25(OH)D3 SERPL-MCNC: 29.8 NG/ML (ref 30–100)

## 2018-08-12 DIAGNOSIS — I48.0 PAF (PAROXYSMAL ATRIAL FIBRILLATION) (HCC): Primary | ICD-10-CM

## 2018-08-13 RX ORDER — APIXABAN 5 MG/1
TABLET, FILM COATED ORAL
Qty: 180 TABLET | Refills: 2 | Status: SHIPPED | OUTPATIENT
Start: 2018-08-13 | End: 2018-10-29 | Stop reason: DRUGHIGH

## 2018-08-23 ENCOUNTER — PATIENT OUTREACH (OUTPATIENT)
Dept: OTHER | Facility: HOSPITAL | Age: 83
End: 2018-08-23

## 2018-08-23 NOTE — PROGRESS NOTES
Reached out to patient to introduce myself and the outpatient care management program  Spoke to his daughter [facility]  She reports that Alvaro Olson is doing well and receiving all the care he needs  They have been following up with specialists as instructed  They have no health care needs that are not being addressed at this time  I told [facility] to feel free to reach out in the future if she feels I can be of any assistance  She agreed she would

## 2018-10-26 DIAGNOSIS — N40.1 BENIGN PROSTATIC HYPERPLASIA WITH LOWER URINARY TRACT SYMPTOMS, SYMPTOM DETAILS UNSPECIFIED: ICD-10-CM

## 2018-10-26 RX ORDER — FINASTERIDE 5 MG/1
5 TABLET, FILM COATED ORAL DAILY
Qty: 90 TABLET | Refills: 2 | Status: SHIPPED | OUTPATIENT
Start: 2018-10-26

## 2018-10-26 NOTE — TELEPHONE ENCOUNTER
[facility] patient, managed by Dr Matthew Camarillo, received faxed request from [facility] pharmacy for refill of Finasteride  One year follow up scheduled 8/1/2019

## 2018-10-29 ENCOUNTER — OFFICE VISIT (OUTPATIENT)
Dept: CARDIOLOGY CLINIC | Facility: CLINIC | Age: 83
End: 2018-10-29
Payer: MEDICARE

## 2018-10-29 VITALS
SYSTOLIC BLOOD PRESSURE: 110 MMHG | HEIGHT: 70 IN | WEIGHT: 242 LBS | OXYGEN SATURATION: 98 % | HEART RATE: 116 BPM | BODY MASS INDEX: 34.65 KG/M2 | DIASTOLIC BLOOD PRESSURE: 66 MMHG

## 2018-10-29 DIAGNOSIS — I50.22 CHRONIC SYSTOLIC CHF (CONGESTIVE HEART FAILURE) (HCC): Chronic | ICD-10-CM

## 2018-10-29 DIAGNOSIS — I48.91 ATRIAL FIBRILLATION, UNSPECIFIED TYPE (HCC): Primary | ICD-10-CM

## 2018-10-29 DIAGNOSIS — I10 ESSENTIAL HYPERTENSION: ICD-10-CM

## 2018-10-29 DIAGNOSIS — I42.8 NICM (NONISCHEMIC CARDIOMYOPATHY) (HCC): ICD-10-CM

## 2018-10-29 PROCEDURE — 99214 OFFICE O/P EST MOD 30 MIN: CPT | Performed by: INTERNAL MEDICINE

## 2018-10-29 PROCEDURE — 93000 ELECTROCARDIOGRAM COMPLETE: CPT | Performed by: INTERNAL MEDICINE

## 2018-10-29 RX ORDER — DIGOXIN 125 MCG
125 TABLET ORAL DAILY
Qty: 90 TABLET | Refills: 3 | Status: SHIPPED | OUTPATIENT
Start: 2018-10-29 | End: 2019-10-28 | Stop reason: SDUPTHER

## 2018-10-29 RX ORDER — COLLAGENASE SANTYL 250 [ARB'U]/G
OINTMENT TOPICAL
Refills: 1 | COMMUNITY
Start: 2018-09-05 | End: 2019-05-07

## 2018-10-29 NOTE — PROGRESS NOTES
Cardiology Follow Up    Rigoberto Joseph  [birth year]  [id number]  [facility] [facility] [place]  [place] [id number]    1  Atrial fibrillation, unspecified type ([place] [address] )  POCT ECG   2  Chronic systolic CHF (congestive heart failure) ([place] [address] )     3  Essential hypertension     4  NICM (nonischemic cardiomyopathy) [facility])       Chief Complaint   Patient presents with    Follow-up     6 months     Shortness of Breath     on exertion, while walking , pt has no other cardiac complaints      Interval History: Patient feels well other than shortness of breath with exertion, which seems to have been worsening over the past few months  Has been gaining weight - baseline is usually about 20 lbs less - gradual increase over the past month  Patient Active Problem List   Diagnosis    Acute kidney injury superimposed on chronic kidney disease ([place] [address] )    Atrial fibrillation (HCC)    Chronic systolic CHF (congestive heart failure) ([facility])    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    Depression    Hyponatremia    Azotemia    Arthritis    Legally blind    Peripheral neuropathy    NICM (nonischemic cardiomyopathy) ([place] [address] )    Other proteinuria    Essential hypertension    Gross hematuria    Urinary retention    Constipation    Benign prostatic hyperplasia with lower urinary tract symptoms    CKD (chronic kidney disease) stage 3, GFR 30-59 ml/min ([facility])     Past Medical History:   Diagnosis Date    Atrial fibrillation ([place] [address] )     Hypertension     Macular degeneration      Social History     Social History    Marital status:       Spouse name: N/A    Number of children: N/A    Years of education: N/A     Occupational History    RETIRED      Social History Main Topics    Smoking status: Former Smoker    Smokeless tobacco: Never Used    Alcohol use No    Drug use: No    Sexual activity: Not on file     Other Topics Concern    Not on file     Social History Narrative    No narrative on file      Family History   Problem Relation Age of Onset    Cancer Mother     Cancer Father     Heart failure Brother         jeanine     Heart attack Neg Hx     Stroke Neg Hx     Anuerysm Neg Hx     Clotting disorder Neg Hx     Arrhythmia Neg Hx     Sudden death Neg Hx         scd    Coronary artery disease Neg Hx     Hypertension Neg Hx     Hyperlipidemia Neg Hx      Past Surgical History:   Procedure Laterality Date    ESOPHAGOGASTRODUODENOSCOPY N/A 2/15/2018    Procedure: ESOPHAGOGASTRODUODENOSCOPY (EGD);   Surgeon: Jaqui Correa MD;  Location: [facility];  Service: Gastroenterology       Current Outpatient Prescriptions:     apixaban (ELIQUIS) 2 5 mg, Take 1 tablet (2 5 mg total) by mouth 2 (two) times a day, Disp: 60 tablet, Rfl: 0    aspirin (ECOTRIN LOW STRENGTH) 81 mg EC tablet, Take 81 mg by mouth daily  , Disp: , Rfl:     bisacodyl (DULCOLAX) 10 mg suppository, Insert 1 suppository (10 mg total) into the rectum once for 1 dose, Disp: 12 suppository, Rfl: 0    digoxin (LANOXIN) 0 125 mg tablet, Take 125 mcg by mouth daily, Disp: , Rfl: 3    escitalopram (LEXAPRO) 10 mg tablet, Take 1 tablet (10 mg total) by mouth daily, Disp: 30 tablet, Rfl: 0    finasteride (PROSCAR) 5 mg tablet, Take 1 tablet (5 mg total) by mouth daily (Patient taking differently: Take 5 mg by mouth daily as needed  ), Disp: 90 tablet, Rfl: 2    lisinopril (ZESTRIL) 2 5 mg tablet, Take 2 5 mg by mouth daily, Disp: , Rfl: 3    loratadine (CLARITIN) 10 mg tablet, Take by mouth daily as needed  , Disp: , Rfl:     metoprolol tartrate (LOPRESSOR) 100 mg tablet, Take 1 tablet (100 mg total) by mouth every 12 (twelve) hours (Patient taking differently: Take 50 mg by mouth every 12 (twelve) hours  ), Disp: 60 tablet, Rfl: 0    oxybutynin (DITROPAN) 5 mg tablet, Take 1 tablet by mouth 2 (two) times a day, Disp: , Rfl:     polyethylene glycol (MIRALAX) 17 g packet, Take 17 g by mouth daily, Disp: 14 each, Rfl: 0    SANTYL ointment, APPLY TO LEFT KNEE TWICE DAILY, Disp: , Rfl: 1    torsemide (DEMADEX) 10 mg tablet, Take 1 tablet (10 mg total) by mouth daily, Disp: 30 tablet, Rfl: 0  No Known Allergies    Labs:  Lab Requisition on 08/08/2018   Component Date Value    Sodium 08/08/2018 144     Potassium 08/08/2018 4 3     Chloride 08/08/2018 100     CO2 08/08/2018 30     ANION GAP 08/08/2018 14*    BUN 08/08/2018 16     Creatinine 08/08/2018 1 50*    Glucose 08/08/2018 67     Calcium 08/08/2018 9 1     AST 08/08/2018 30     ALT 08/08/2018 28     Alkaline Phosphatase 08/08/2018 60     Total Protein 08/08/2018 7 5     Albumin 08/08/2018 3 9     Total Bilirubin 08/08/2018 0 80     eGFR 08/08/2018 43     WBC 08/08/2018 8 39     RBC 08/08/2018 4 37     Hemoglobin 08/08/2018 12 4     Hematocrit 08/08/2018 41 8     MCV 08/08/2018 96     MCH 08/08/2018 28 4     MCHC 08/08/2018 29 7*    RDW 08/08/2018 17 6*    MPV 08/08/2018 10 3     Platelets 29/01/2928 273     nRBC 08/08/2018 0     Neutrophils Relative 08/08/2018 67     Immat GRANS % 08/08/2018 1     Lymphocytes Relative 08/08/2018 12*    Monocytes Relative 08/08/2018 10     Eosinophils Relative 08/08/2018 9*    Basophils Relative 08/08/2018 1     Neutrophils Absolute 08/08/2018 5 75     Immature Grans Absolute 08/08/2018 0 04     Lymphocytes Absolute 08/08/2018 1 00     Monocytes Absolute 08/08/2018 0 84     Eosinophils Absolute 08/08/2018 0 71*    Basophils Absolute 08/08/2018 0 05     Vit D, 25-Hydroxy 08/08/2018 29 8*   Procedure visit on 07/26/2018   Component Date Value    LEUKOCYTE ESTERASE,UA 07/26/2018 small     NITRITE,UA 07/26/2018 neg     SL AMB POCT UROBILINOGEN 07/26/2018 neg     SL AMB POCT URINE PROTEIN 07/26/2018 neg      PH,UA 07/26/2018 6 0      BLOOD,UA 07/26/2018 neg     SPECIFIC GRAVITY,UA 07/26/2018 1 015     KETONES,UA 07/26/2018 neg     BILIRUBIN,UA 07/26/2018 neg     GLUCOSE, UA 07/26/2018 neg      COLOR,UA 07/26/2018 yellow      CLARITY,UA 07/26/2018 clear    Office Visit on 07/03/2018   Component Date Value    LEUKOCYTE ESTERASE,UA 07/03/2018 -     NITRITE,UA 07/03/2018 -     SL AMB POCT UROBILINOGEN 07/03/2018 -     SL AMB POCT URINE PROTEIN 07/03/2018 trace      PH,UA 07/03/2018 6 0      BLOOD,UA 07/03/2018 -     SPECIFIC GRAVITY,UA 07/03/2018 -     KETONES,UA 07/03/2018 -      BILIRUBIN,UA 07/03/2018 -     GLUCOSE, UA 07/03/2018 -      COLOR,UA 07/03/2018 yellow      CLARITY,UA 07/03/2018 clear     Clarity, UA 07/03/2018 Clear     Color, UA 07/03/2018 Yellow     Specific Gravity, UA 07/03/2018 1 014     pH, UA 07/03/2018 6 0     Glucose, UA 07/03/2018 Negative     Ketones, UA 07/03/2018 Negative     Blood, UA 07/03/2018 Negative     Protein, UA 07/03/2018 Negative     Nitrite, UA 07/03/2018 Negative     Bilirubin, UA 07/03/2018 Negative     Urobilinogen, UA 07/03/2018 0 2     Leukocytes, UA 07/03/2018 Negative     WBC, UA 07/03/2018 None Seen     RBC, UA 07/03/2018 None Seen     Hyaline Casts, UA 07/03/2018 None Seen     Bacteria, UA 07/03/2018 None Seen     Epithelial Cells 07/03/2018 None Seen     Case Report 07/03/2018                      Value:Non-gynecologic Cytology                          Case: [id number]                                  Authorizing Provider:  Khang Villalobos,     Collected:           07/03/2018 1359                                     PA-C                                                                         Ordering Location:     [facility]        Received:            07/03/2018 [address]                                     [facility]                                                               Pathologist:           Kate Ortega MD                                                        Specimen:    Urine, Clean Catch  Final Diagnosis 07/03/2018                      Value: This result contains rich text formatting which cannot be displayed here  [facility] Gross Description 07/03/2018                      Value: This result contains rich text formatting which cannot be displayed here   Additional Information 07/03/2018                      Value: This result contains rich text formatting which cannot be displayed here     Admission on 06/15/2018, Discharged on 06/18/2018   Component Date Value    WBC 06/15/2018 8 26     RBC 06/15/2018 4 73     Hemoglobin 06/15/2018 12 9     Hematocrit 06/15/2018 40 5     MCV 06/15/2018 86     MCH 06/15/2018 27 3     MCHC 06/15/2018 31 9     RDW 06/15/2018 14 3     MPV 06/15/2018 9 7     Platelets 10/62/3544 269     Neutrophils Relative 06/15/2018 82*    Lymphocytes Relative 06/15/2018 9*    Monocytes Relative 06/15/2018 8     Eosinophils Relative 06/15/2018 2     Basophils Relative 06/15/2018 0     Neutrophils Absolute 06/15/2018 6 79     Lymphocytes Absolute 06/15/2018 0 72     Monocytes Absolute 06/15/2018 0 62     Eosinophils Absolute 06/15/2018 0 12     Basophils Absolute 06/15/2018 0 01     Protime 06/15/2018 14 7*    INR 06/15/2018 1 18*    PTT 06/15/2018 34     Sodium 06/15/2018 135*    Potassium 06/15/2018 4 7     Chloride 06/15/2018 99*    CO2 06/15/2018 25     ANION GAP 06/15/2018 11     BUN 06/15/2018 30*    Creatinine 06/15/2018 1 84*    Glucose 06/15/2018 126     Calcium 06/15/2018 8 9     AST 06/15/2018 30     ALT 06/15/2018 29     Alkaline Phosphatase 06/15/2018 72     Total Protein 06/15/2018 8 0     Albumin 06/15/2018 4 0     Total Bilirubin 06/15/2018 0 80     eGFR 06/15/2018 33     Color, UA 06/15/2018 Bloody     Clarity, UA 06/15/2018 Cloudy     pH, UA 06/15/2018 6 0     Leukocytes, UA 06/15/2018 Negative     Nitrite, UA 06/15/2018 Negative     Protein, UA 06/15/2018 100 (2+)*    Glucose, UA 06/15/2018 Negative  Ketones, UA 06/15/2018 Negative     Urobilinogen, UA 06/15/2018 0 2     Bilirubin, UA 06/15/2018 Negative     Blood, UA 06/15/2018 Large*    Specific Tabiona, UA 06/15/2018 1 020     RBC, UA 06/15/2018 Innumerable*    WBC, UA 06/15/2018 0-1*    Epithelial Cells 06/15/2018 None Seen     Bacteria, UA 06/15/2018 Occasional     Extra Tube 06/15/2018 Hold for add-ons       Urine Culture 06/15/2018 No Growth <1000 cfu/mL     Hemoglobin 06/15/2018 11 8*    Hematocrit 06/15/2018 38 1     Sodium 06/16/2018 135*    Potassium 06/16/2018 4 5     Chloride 06/16/2018 100     CO2 06/16/2018 25     ANION GAP 06/16/2018 10     BUN 06/16/2018 28*    Creatinine 06/16/2018 1 75*    Glucose 06/16/2018 115     Calcium 06/16/2018 8 9     eGFR 06/16/2018 35     WBC 06/16/2018 9 24     RBC 06/16/2018 4 55     Hemoglobin 06/16/2018 12 4     Hematocrit 06/16/2018 39 1     MCV 06/16/2018 86     MCH 06/16/2018 27 3     MCHC 06/16/2018 31 7     RDW 06/16/2018 14 5     MPV 06/16/2018 10 6     Platelets 78/05/8299 270     Neutrophils Relative 06/16/2018 73     Lymphocytes Relative 06/16/2018 12*    Monocytes Relative 06/16/2018 12     Eosinophils Relative 06/16/2018 3     Basophils Relative 06/16/2018 0     Neutrophils Absolute 06/16/2018 6 70     Lymphocytes Absolute 06/16/2018 1 09     Monocytes Absolute 06/16/2018 1 15     Eosinophils Absolute 06/16/2018 0 29     Basophils Absolute 06/16/2018 0 01     Hemoglobin 06/16/2018 12 1     Hematocrit 06/16/2018 38 8     Ventricular Rate 06/15/2018 95     Atrial Rate 06/15/2018 113     QRSD Interval 06/15/2018 98     QT Interval 06/15/2018 346     QTC Interval 06/15/2018 434     QRS Axis 06/15/2018 -25     T Wave Axis 06/15/2018 51     WBC 06/17/2018 7 73     RBC 06/17/2018 4 53     Hemoglobin 06/17/2018 12 4     Hematocrit 06/17/2018 39 0     MCV 06/17/2018 86     MCH 06/17/2018 27 4     MCHC 06/17/2018 31 8     RDW 06/17/2018 14 8     MPV 06/17/2018 9 9     Platelets 86/47/8166 267     Neutrophils Relative 06/17/2018 73     Lymphocytes Relative 06/17/2018 12*    Monocytes Relative 06/17/2018 10     Eosinophils Relative 06/17/2018 5     Basophils Relative 06/17/2018 0     Neutrophils Absolute 06/17/2018 5 64     Lymphocytes Absolute 06/17/2018 0 95     Monocytes Absolute 06/17/2018 0 77     Eosinophils Absolute 06/17/2018 0 36     Basophils Absolute 06/17/2018 0 01     Sodium 06/17/2018 133*    Potassium 06/17/2018 5 0     Chloride 06/17/2018 97*    CO2 06/17/2018 28     ANION GAP 06/17/2018 8     BUN 06/17/2018 27*    Creatinine 06/17/2018 1 54*    Glucose 06/17/2018 121     Calcium 06/17/2018 9 3     eGFR 06/17/2018 41     WBC 06/18/2018 8 30     RBC 06/18/2018 4 38     Hemoglobin 06/18/2018 12 0     Hematocrit 06/18/2018 38 0     MCV 06/18/2018 87     MCH 06/18/2018 27 4     MCHC 06/18/2018 31 6     RDW 06/18/2018 14 8     MPV 06/18/2018 10 1     Platelets 47/28/2704 262     Neutrophils Relative 06/18/2018 70     Lymphocytes Relative 06/18/2018 13*    Monocytes Relative 06/18/2018 10     Eosinophils Relative 06/18/2018 7*    Basophils Relative 06/18/2018 0     Neutrophils Absolute 06/18/2018 5 77     Lymphocytes Absolute 06/18/2018 1 11     Monocytes Absolute 06/18/2018 0 85     Eosinophils Absolute 06/18/2018 0 55     Basophils Absolute 06/18/2018 0 02      No results found for: CHOL, TRIG, HDL, LDLDIRECT  Imaging: No results found  Review of Systems:  Review of Systems   Constitutional: Negative for activity change, appetite change, fatigue and fever  HENT: Negative for nosebleeds and sore throat  Eyes: Negative for photophobia and visual disturbance  Respiratory: Negative for cough, chest tightness, shortness of breath and wheezing  Cardiovascular: Negative for chest pain, palpitations and leg swelling  Gastrointestinal: Negative for abdominal pain, diarrhea, nausea and vomiting  Endocrine: Negative for polyuria  Genitourinary: Positive for dysuria  Negative for frequency and hematuria  Musculoskeletal: Negative for arthralgias, back pain and gait problem  Skin: Positive for rash  Negative for pallor  Neurological: Negative for dizziness, syncope, speech difficulty and light-headedness  Hematological: Does not bruise/bleed easily  Psychiatric/Behavioral: Negative for agitation, behavioral problems and confusion  Physical Exam:  Physical Exam   Constitutional: He is oriented to person, place, and time  He appears well-developed and well-nourished  No distress  HENT:   Head: Normocephalic and atraumatic  Nose: Nose normal    Eyes: Pupils are equal, round, and reactive to light  EOM are normal  No scleral icterus  Neck: Normal range of motion  Neck supple  No JVD present  Cardiovascular: Normal rate, S1 normal, S2 normal and normal heart sounds  An irregular rhythm present  Exam reveals no gallop, no S3, no distant heart sounds and no friction rub  No murmur heard  No systolic murmur is present   Pulmonary/Chest: Effort normal and breath sounds normal  No respiratory distress  He has no wheezes  He has no rales  Abdominal: Soft  Bowel sounds are normal  He exhibits no distension  There is no tenderness  Musculoskeletal: Normal range of motion  He exhibits no edema or deformity  Chronic venous stasis changes   Neurological: He is alert and oriented to person, place, and time  No cranial nerve deficit  Skin: Skin is warm and dry  No rash noted  He is not diaphoretic  No erythema  Psychiatric: He has a normal mood and affect  His behavior is normal    Vitals reviewed  Blood pressure 110/66, pulse (!) 116, height 5' 10" (1 778 m), weight 110 kg (242 lb), SpO2 98 %  EKG:  Atrial fibrillation   Nonspecific ST and T wave abnormality  Abnormal EKG    Discussion/Summary:  Afib: HR well controlled   Continue Toprol XL at current dose along with digoxin 125mcg daily  CHADS2 - 3, continue Eliquis 5mg BID for anticoagulation  EF has improved with good rate control  Rates elevated today, but mildly so - continue to follow for now  NICM/Chronic systolic CHF: seems to be related to rate control in afib (tachy-mediated) appears euvolemic, continue maintenance torsemide  SOB likely from increased weight - which is more likely to be due to flesh weight increase with dietary indiscretion      HTN: well controlled, monitor on B-blocker and ACE-I

## 2018-10-29 NOTE — PATIENT INSTRUCTIONS

## 2019-01-04 ENCOUNTER — APPOINTMENT (OUTPATIENT)
Dept: LAB | Facility: CLINIC | Age: 84
End: 2019-01-04
Payer: COMMERCIAL

## 2019-01-04 DIAGNOSIS — N18.30 CKD (CHRONIC KIDNEY DISEASE) STAGE 3, GFR 30-59 ML/MIN (HCC): ICD-10-CM

## 2019-01-04 DIAGNOSIS — N18.30 CKD (CHRONIC KIDNEY DISEASE), STAGE III (HCC): ICD-10-CM

## 2019-01-04 DIAGNOSIS — E87.1 HYPONATREMIA: ICD-10-CM

## 2019-01-04 DIAGNOSIS — N18.30 CKD (CHRONIC KIDNEY DISEASE), STAGE III (HCC): Primary | ICD-10-CM

## 2019-01-04 LAB
25(OH)D3 SERPL-MCNC: 27.2 NG/ML (ref 30–100)
ALBUMIN SERPL BCP-MCNC: 3.8 G/DL (ref 3.5–5)
ALP SERPL-CCNC: 70 U/L (ref 46–116)
ALT SERPL W P-5'-P-CCNC: 26 U/L (ref 12–78)
ANION GAP SERPL CALCULATED.3IONS-SCNC: 6 MMOL/L (ref 4–13)
AST SERPL W P-5'-P-CCNC: 21 U/L (ref 5–45)
BILIRUB SERPL-MCNC: 0.69 MG/DL (ref 0.2–1)
BUN SERPL-MCNC: 15 MG/DL (ref 5–25)
CALCIUM SERPL-MCNC: 9.1 MG/DL (ref 8.3–10.1)
CHLORIDE SERPL-SCNC: 101 MMOL/L (ref 100–108)
CO2 SERPL-SCNC: 31 MMOL/L (ref 21–32)
CREAT SERPL-MCNC: 1.26 MG/DL (ref 0.6–1.3)
CREAT UR-MCNC: 52.5 MG/DL
ERYTHROCYTE [DISTWIDTH] IN BLOOD BY AUTOMATED COUNT: 14.9 % (ref 11.6–15.1)
GFR SERPL CREATININE-BSD FRML MDRD: 52 ML/MIN/1.73SQ M
GLUCOSE SERPL-MCNC: 119 MG/DL (ref 65–140)
HCT VFR BLD AUTO: 40.1 % (ref 36.5–49.3)
HGB BLD-MCNC: 12 G/DL (ref 12–17)
MCH RBC QN AUTO: 27.1 PG (ref 26.8–34.3)
MCHC RBC AUTO-ENTMCNC: 29.9 G/DL (ref 31.4–37.4)
MCV RBC AUTO: 91 FL (ref 82–98)
PLATELET # BLD AUTO: 287 THOUSANDS/UL (ref 149–390)
PMV BLD AUTO: 10.2 FL (ref 8.9–12.7)
POTASSIUM SERPL-SCNC: 4.5 MMOL/L (ref 3.5–5.3)
PROT SERPL-MCNC: 7.7 G/DL (ref 6.4–8.2)
PROT UR-MCNC: <6 MG/DL
PROT/CREAT UR: <0.11 MG/G{CREAT} (ref 0–0.1)
RBC # BLD AUTO: 4.43 MILLION/UL (ref 3.88–5.62)
SODIUM SERPL-SCNC: 138 MMOL/L (ref 136–145)
WBC # BLD AUTO: 9.26 THOUSAND/UL (ref 4.31–10.16)

## 2019-01-04 PROCEDURE — 84156 ASSAY OF PROTEIN URINE: CPT

## 2019-01-04 PROCEDURE — 82306 VITAMIN D 25 HYDROXY: CPT

## 2019-01-04 PROCEDURE — 82570 ASSAY OF URINE CREATININE: CPT

## 2019-01-04 PROCEDURE — 80053 COMPREHEN METABOLIC PANEL: CPT

## 2019-01-04 PROCEDURE — 36415 COLL VENOUS BLD VENIPUNCTURE: CPT

## 2019-01-04 PROCEDURE — 85027 COMPLETE CBC AUTOMATED: CPT

## 2019-01-06 NOTE — PROGRESS NOTES
NEPHROLOGY OFFICE VISIT   Kevon Scott 80 y o  male MRN: [id number]  1/7/2019    Reason for Visit:  Follow-up    ASSESSMENT and PLAN:    I had the pleasure of seeing Emerita Nair today in the renal clinic for the continued management of CKD  He was last seen by Dr Maureen Clarke on 08/02/2018  Since our last visit, there has been no ER visits or hospitilizations  His chief complaint is vision loss due to macular degeneration  He has shortness of breath with exertion which is essentially unchanged  He has mild lower extremity edema with no evidence of volume overload  Several weeks ago due to increasing weight and shortness of breath torsemide was up titrated to 20 mg daily x3 days with an improvement  He has no chest pain, dizziness, headache  No nausea, vomiting, diarrhea  The last blood work was done on 01/04/2018, which we have reviewed together  He complains of pruritus lower extremities which is likely related to extremely dry skin  Emerita Nair is with his daughter Lorena Mendez who helps with his care  ASSESSMENT and PLAN:  1  Chronic kidney disease, stage III:  Baseline creatinine between 1 5-1 8  · Creatinine 1 26  · Renal function stable  · No evidence of volume overload  2  Proteinuria:    · Well controlled urine protein creatinine ratio less than 0 11 mg per g  3  Hyponatremia, mild:    · On fluid restriction and torsemide  Sodium level normal 138  4  Hypertension, volume status:    · Blood pressure was well controlled on lisinopril 2 5 mg daily, torsemide 10 mg daily metoprolol 50 mg every 12 hr   · Blood pressure acceptable, no orthostasis  · Patient and daughter described dry weight as near 230 lb (?)  He is currently 242 lbs which is the same weight recorded when he saw his cardiologist last fall    Other than lower extremity edema +1 ankle, he has no evidence of volume overload  · I instructed Emerita Nair and his daughter Lorena Mendez to increase torsemide to 20 mg daily if weight increases by 2-3 lb in 1 day or 5 lb in 1 week   I instructed the daughter to call the office if torsemide is frequently up titrated  I also provided them with a lab slip to go for blood work  5  CKD MBD:    · Vitamin D level 27 2  · Patient instructed to take vitamin D3 1000 units daily  · Check PTH next office visit  6  Anemia assessment:    · Hemoglobin level stable 12 2   7  Urinary retention in the setting of benign prostatic hypertrophy, gross hematuria:    · Following with Urology status post cystoscopy in July 2018  · Follows with Dr Qasim Santos on a regular basis  8  Nonischemic cardiomyopathy, chronic systolic CHF:    · Follows with Dr Cornelius Prakash for management  Patient was noted to be likely gaining weight due to dietary indiscretion at the last office visit with Cardiology which was contributing to shortness of breath  Also uncontrolled atrial fibrillation contributing  9  Atrial fibrillation:  On Toprol, digoxin, Eliquis  Managed by Cardiology, rate controlled  10  Pruritus lower extremities:  No rash noted  Check phosphorus before next office visit    Follow-up with Dr Kaushik García in 4-5 months  PATIENT INSTRUCTIONS:    Patient Instructions   1  TAKE VITAMIN D 3 1000 UNITS DAILY  2  AVOID NSAIDS  3  TAKEN ADDITIONAL DOSE OF TORSEMIDE IF WEIGHT INCREASES BY 2-3 LB IN 1 DAY OR 5 LB IN 1 WEEK  4  IF WEIGHT DOES NOT IMPROVE PLEASE CALL THE OFFICE  5  IF DAVIDA IS REQUIRING INCREASE IN TORSEMIDE ON A WEEKLY BASIS PLEASE NOTIFY THE OFFICE  6  USE MOISTURIZING SOAPS AND LOTION FOR LOWER EXTREMITY DRYNESS  7  FOLLOW-UP BLOOD WORK BEFORE NEXT APPOINTMENT WHICH WILL BE IN MAY OR June  8  I HAVE ALSO PROVIDED YOU WITH IN ORDER FOR BLOOD WORK IF TORSEMIDE IS BEING UP TITRATED ON A REGULAR BASIS  Orders Placed This Encounter   Procedures    Comprehensive metabolic panel     This is a patient instruction: Patient fasting for 8 hours or longer recommended       Standing Status:   Future     Standing Expiration Date:   9/7/2019    Magnesium Standing Status:   Future     Standing Expiration Date:   9/7/2019    CBC     Standing Status:   Future     Standing Expiration Date:   9/7/2019    Urinalysis with reflex to microscopic     Standing Status:   Future     Standing Expiration Date:   9/7/2019    Protein / creatinine ratio, urine     Standing Status:   Future     Standing Expiration Date:   9/7/2019    PTH, intact     Standing Status:   Future     Standing Expiration Date:   9/7/2019    Phosphorus     Standing Status:   Future     Standing Expiration Date:   9/7/2019    Basic metabolic panel     This is a patient instruction: Patient fasting for 8 hours or longer recommended  Standing Status:   Future     Standing Expiration Date:   4/7/2019       OBJECTIVE:  Current Weight: Weight - Scale: 110 kg (243 lb)  Vitals:    01/07/19 1432 01/07/19 1452 01/07/19 1454   BP:  128/78 134/72   BP Location:  Left arm Right arm   Patient Position:  Sitting Sitting   Cuff Size:  Standard    Weight: 110 kg (243 lb)     Height: 5' 10" (1 778 m)      Body mass index is 34 87 kg/m²  REVIEW OF SYSTEMS:    Review of Systems   Constitutional: Negative for activity change, appetite change, fever and unexpected weight change  HENT: Negative  Eyes: Positive for visual disturbance ( vision loss)  Respiratory: Positive for shortness of breath (Chronic shortness of breath with activity)  Cardiovascular: Positive for leg swelling  Negative for chest pain and palpitations  Gastrointestinal: Negative  Genitourinary: Positive for urgency  Negative for decreased urine volume  Musculoskeletal: Positive for arthralgias  Neurological: Negative  Psychiatric/Behavioral: Negative  PHYSICAL EXAM:      Physical Exam   Constitutional: He is oriented to person, place, and time  No distress  Obese, Elderly gentleman in no acute distress  HENT:   Head: Normocephalic and atraumatic     Mouth/Throat: Oropharynx is clear and moist    Eyes: Conjunctivae are normal  Right eye exhibits no discharge  Left eye exhibits no discharge  No scleral icterus  Neck: Neck supple  No JVD present  Cardiovascular: Normal rate  An irregularly irregular rhythm present  Exam reveals no gallop, no S3 and no S4  No murmur heard  No carotid bruit bilaterally   Pulmonary/Chest: Effort normal and breath sounds normal  No respiratory distress  He has no wheezes  He has no rales  No shortness of breath at rest   Patient gets winded with activity  He has significant mobility issues, uses a cane for ambulation  Abdominal: Soft  Bowel sounds are normal  He exhibits no distension  There is no tenderness  There is no rebound and no guarding  Musculoskeletal: He exhibits edema (Plus one ankle edema bilaterally)  Neurological: He is alert and oriented to person, place, and time  Skin: Skin is dry  He is not diaphoretic  No erythema  No pallor  Psychiatric: He has a normal mood and affect   His behavior is normal  Judgment and thought content normal        Medications:    Current Outpatient Prescriptions:     apixaban (ELIQUIS) 2 5 mg, Take 1 tablet (2 5 mg total) by mouth 2 (two) times a day, Disp: 60 tablet, Rfl: 0    aspirin (ECOTRIN LOW STRENGTH) 81 mg EC tablet, Take 81 mg by mouth daily  , Disp: , Rfl:     bisacodyl (DULCOLAX) 10 mg suppository, Insert 1 suppository (10 mg total) into the rectum once for 1 dose, Disp: 12 suppository, Rfl: 0    digoxin (LANOXIN) 0 125 mg tablet, Take 1 tablet (125 mcg total) by mouth daily, Disp: 90 tablet, Rfl: 3    escitalopram (LEXAPRO) 10 mg tablet, Take 1 tablet (10 mg total) by mouth daily, Disp: 30 tablet, Rfl: 0    finasteride (PROSCAR) 5 mg tablet, Take 1 tablet (5 mg total) by mouth daily (Patient taking differently: Take 5 mg by mouth daily as needed  ), Disp: 90 tablet, Rfl: 2    lisinopril (ZESTRIL) 2 5 mg tablet, Take 2 5 mg by mouth daily, Disp: , Rfl: 3    loratadine (CLARITIN) 10 mg tablet, Take by mouth daily as needed  , Disp: , Rfl:     metoprolol tartrate (LOPRESSOR) 100 mg tablet, Take 1 tablet (100 mg total) by mouth every 12 (twelve) hours (Patient taking differently: Take 50 mg by mouth every 12 (twelve) hours  ), Disp: 60 tablet, Rfl: 0    pantoprazole (PROTONIX) 40 mg tablet, TAKE 1 TABLET (40 MG TOTAL) BY MOUTH DAILY IN THE EARLY MORNING, Disp: , Rfl: 2    polyethylene glycol (MIRALAX) 17 g packet, Take 17 g by mouth daily, Disp: 14 each, Rfl: 0    SANTYL ointment, APPLY TO LEFT KNEE TWICE DAILY, Disp: , Rfl: 1    torsemide (DEMADEX) 10 mg tablet, Take 1 tablet (10 mg total) by mouth daily, Disp: 30 tablet, Rfl: 0    oxybutynin (DITROPAN) 5 mg tablet, Take 1 tablet by mouth 2 (two) times a day, Disp: , Rfl:     Laboratory Results:    Results from last 7 days  Lab Units 01/04/19  1248   WBC Thousand/uL 9 26   HEMOGLOBIN g/dL 12 0   HEMATOCRIT % 40 1   PLATELETS Thousands/uL 287   POTASSIUM mmol/L 4 5   CHLORIDE mmol/L 101   CO2 mmol/L 31   BUN mg/dL 15   CREATININE mg/dL 1 26   CALCIUM mg/dL 9 1       Results for orders placed or performed in visit on 01/04/19   Comprehensive metabolic panel   Result Value Ref Range    Sodium 138 136 - 145 mmol/L    Potassium 4 5 3 5 - 5 3 mmol/L    Chloride 101 100 - 108 mmol/L    CO2 31 21 - 32 mmol/L    ANION GAP 6 4 - 13 mmol/L    BUN 15 5 - 25 mg/dL    Creatinine 1 26 0 60 - 1 30 mg/dL    Glucose 119 65 - 140 mg/dL    Calcium 9 1 8 3 - 10 1 mg/dL    AST 21 5 - 45 U/L    ALT 26 12 - 78 U/L    Alkaline Phosphatase 70 46 - 116 U/L    Total Protein 7 7 6 4 - 8 2 g/dL    Albumin 3 8 3 5 - 5 0 g/dL    Total Bilirubin 0 69 0 20 - 1 00 mg/dL    eGFR 52 ml/min/1 73sq m   CBC   Result Value Ref Range    WBC 9 26 4 31 - 10 16 Thousand/uL    RBC 4 43 3 88 - 5 62 Million/uL    Hemoglobin 12 0 12 0 - 17 0 g/dL    Hematocrit 40 1 36 5 - 49 3 %    MCV 91 82 - 98 fL    MCH 27 1 26 8 - 34 3 pg    MCHC 29 9 (L) 31 4 - 37 4 g/dL    RDW 14 9 11 6 - 15 1 %    Platelets 905 321 - 186 Thousands/uL    MPV 10 2 8 9 - 12 7 fL   Vitamin D 25 hydroxy   Result Value Ref Range    Vit D, 25-Hydroxy 27 2 (L) 30 0 - 100 0 ng/mL

## 2019-01-07 ENCOUNTER — OFFICE VISIT (OUTPATIENT)
Dept: NEPHROLOGY | Facility: CLINIC | Age: 84
End: 2019-01-07
Payer: COMMERCIAL

## 2019-01-07 VITALS
SYSTOLIC BLOOD PRESSURE: 134 MMHG | HEIGHT: 70 IN | BODY MASS INDEX: 34.79 KG/M2 | DIASTOLIC BLOOD PRESSURE: 72 MMHG | WEIGHT: 243 LBS

## 2019-01-07 DIAGNOSIS — R33.9 URINARY RETENTION: ICD-10-CM

## 2019-01-07 DIAGNOSIS — R80.8 OTHER PROTEINURIA: ICD-10-CM

## 2019-01-07 DIAGNOSIS — N18.30 CKD (CHRONIC KIDNEY DISEASE) STAGE 3, GFR 30-59 ML/MIN (HCC): Primary | ICD-10-CM

## 2019-01-07 DIAGNOSIS — I10 ESSENTIAL HYPERTENSION: ICD-10-CM

## 2019-01-07 DIAGNOSIS — E87.1 HYPONATREMIA: ICD-10-CM

## 2019-01-07 PROCEDURE — 99213 OFFICE O/P EST LOW 20 MIN: CPT | Performed by: NURSE PRACTITIONER

## 2019-01-07 RX ORDER — MELATONIN
1000 DAILY
Qty: 30 TABLET | Refills: 0 | Status: SHIPPED | OUTPATIENT
Start: 2019-01-07

## 2019-01-07 RX ORDER — APIXABAN 5 MG/1
5 TABLET, FILM COATED ORAL 2 TIMES DAILY
Refills: 2 | COMMUNITY
Start: 2018-11-13 | End: 2019-01-07 | Stop reason: SDUPTHER

## 2019-01-07 RX ORDER — PANTOPRAZOLE SODIUM 40 MG/1
TABLET, DELAYED RELEASE ORAL
Refills: 2 | COMMUNITY
Start: 2018-11-29

## 2019-01-07 NOTE — PATIENT INSTRUCTIONS
1  TAKE VITAMIN D 3 1000 UNITS DAILY  2  AVOID NSAIDS  3  TAKEN ADDITIONAL DOSE OF TORSEMIDE IF WEIGHT INCREASES BY 2-3 LB IN 1 DAY OR 5 LB IN 1 WEEK  4  IF WEIGHT DOES NOT IMPROVE PLEASE CALL THE OFFICE  5  IF DAVIDA IS REQUIRING INCREASE IN TORSEMIDE ON A WEEKLY BASIS PLEASE NOTIFY THE OFFICE  6  USE MOISTURIZING SOAPS AND LOTION FOR LOWER EXTREMITY DRYNESS  7  FOLLOW-UP BLOOD WORK BEFORE NEXT APPOINTMENT WHICH WILL BE IN MAY OR June  8  I HAVE ALSO PROVIDED YOU WITH IN ORDER FOR BLOOD WORK IF TORSEMIDE IS BEING UP TITRATED ON A REGULAR BASIS

## 2019-04-26 ENCOUNTER — HOSPITAL ENCOUNTER (EMERGENCY)
Facility: HOSPITAL | Age: 84
Discharge: HOME | End: 2019-04-26
Attending: EMERGENCY MEDICINE | Admitting: EMERGENCY MEDICINE
Payer: COMMERCIAL

## 2019-04-26 VITALS
BODY MASS INDEX: 35.62 KG/M2 | RESPIRATION RATE: 22 BRPM | TEMPERATURE: 99 F | HEART RATE: 109 BPM | SYSTOLIC BLOOD PRESSURE: 137 MMHG | DIASTOLIC BLOOD PRESSURE: 99 MMHG | WEIGHT: 248.24 LBS | OXYGEN SATURATION: 96 %

## 2019-04-26 DIAGNOSIS — R33.9 URINARY RETENTION: Primary | ICD-10-CM

## 2019-04-26 LAB
ANION GAP SERPL CALCULATED.3IONS-SCNC: 6 MMOL/L (ref 4–13)
BACTERIA UR QL AUTO: ABNORMAL /HPF
BASOPHILS # BLD AUTO: 0.04 THOUSANDS/ΜL (ref 0–0.1)
BASOPHILS NFR BLD AUTO: 0 % (ref 0–1)
BILIRUB UR QL STRIP: NEGATIVE
BUN SERPL-MCNC: 19 MG/DL (ref 5–25)
CALCIUM SERPL-MCNC: 9.4 MG/DL (ref 8.3–10.1)
CHLORIDE SERPL-SCNC: 103 MMOL/L (ref 100–108)
CLARITY UR: CLEAR
CO2 SERPL-SCNC: 30 MMOL/L (ref 21–32)
COLOR UR: YELLOW
CREAT SERPL-MCNC: 1.37 MG/DL (ref 0.6–1.3)
EOSINOPHIL # BLD AUTO: 0.07 THOUSAND/ΜL (ref 0–0.61)
EOSINOPHIL NFR BLD AUTO: 1 % (ref 0–6)
ERYTHROCYTE [DISTWIDTH] IN BLOOD BY AUTOMATED COUNT: 16 % (ref 11.6–15.1)
GFR SERPL CREATININE-BSD FRML MDRD: 47 ML/MIN/1.73SQ M
GLUCOSE SERPL-MCNC: 113 MG/DL (ref 65–140)
GLUCOSE UR STRIP-MCNC: NEGATIVE MG/DL
HCT VFR BLD AUTO: 39.9 % (ref 36.5–49.3)
HGB BLD-MCNC: 12.7 G/DL (ref 12–17)
HGB UR QL STRIP.AUTO: ABNORMAL
IMM GRANULOCYTES # BLD AUTO: 0.06 THOUSAND/UL (ref 0–0.2)
IMM GRANULOCYTES NFR BLD AUTO: 1 % (ref 0–2)
KETONES UR STRIP-MCNC: NEGATIVE MG/DL
LEUKOCYTE ESTERASE UR QL STRIP: NEGATIVE
LYMPHOCYTES # BLD AUTO: 0.79 THOUSANDS/ΜL (ref 0.6–4.47)
LYMPHOCYTES NFR BLD AUTO: 6 % (ref 14–44)
MCH RBC QN AUTO: 27.9 PG (ref 26.8–34.3)
MCHC RBC AUTO-ENTMCNC: 31.8 G/DL (ref 31.4–37.4)
MCV RBC AUTO: 88 FL (ref 82–98)
MONOCYTES # BLD AUTO: 1.31 THOUSAND/ΜL (ref 0.17–1.22)
MONOCYTES NFR BLD AUTO: 10 % (ref 4–12)
NEUTROPHILS # BLD AUTO: 10.58 THOUSANDS/ΜL (ref 1.85–7.62)
NEUTS SEG NFR BLD AUTO: 82 % (ref 43–75)
NITRITE UR QL STRIP: NEGATIVE
NON-SQ EPI CELLS URNS QL MICRO: ABNORMAL /HPF
NRBC BLD AUTO-RTO: 0 /100 WBCS
PH UR STRIP.AUTO: 6 [PH] (ref 4.5–8)
PLATELET # BLD AUTO: 277 THOUSANDS/UL (ref 149–390)
PMV BLD AUTO: 9.7 FL (ref 8.9–12.7)
POTASSIUM SERPL-SCNC: 4.6 MMOL/L (ref 3.5–5.3)
PROT UR STRIP-MCNC: NEGATIVE MG/DL
RBC # BLD AUTO: 4.55 MILLION/UL (ref 3.88–5.62)
RBC #/AREA URNS AUTO: ABNORMAL /HPF
SODIUM SERPL-SCNC: 139 MMOL/L (ref 136–145)
SP GR UR STRIP.AUTO: 1.01 (ref 1–1.03)
UROBILINOGEN UR QL STRIP.AUTO: 0.2 E.U./DL
WBC # BLD AUTO: 12.85 THOUSAND/UL (ref 4.31–10.16)
WBC #/AREA URNS AUTO: ABNORMAL /HPF

## 2019-04-26 PROCEDURE — 80048 BASIC METABOLIC PNL TOTAL CA: CPT | Performed by: PHYSICIAN ASSISTANT

## 2019-04-26 PROCEDURE — 96360 HYDRATION IV INFUSION INIT: CPT

## 2019-04-26 PROCEDURE — 99283 EMERGENCY DEPT VISIT LOW MDM: CPT | Performed by: PHYSICIAN ASSISTANT

## 2019-04-26 PROCEDURE — 81001 URINALYSIS AUTO W/SCOPE: CPT

## 2019-04-26 PROCEDURE — 99283 EMERGENCY DEPT VISIT LOW MDM: CPT

## 2019-04-26 PROCEDURE — 36415 COLL VENOUS BLD VENIPUNCTURE: CPT | Performed by: PHYSICIAN ASSISTANT

## 2019-04-26 PROCEDURE — 85025 COMPLETE CBC W/AUTO DIFF WBC: CPT | Performed by: PHYSICIAN ASSISTANT

## 2019-04-26 RX ORDER — LIDOCAINE HYDROCHLORIDE 20 MG/ML
JELLY TOPICAL ONCE
Status: COMPLETED | OUTPATIENT
Start: 2019-04-26 | End: 2019-04-26

## 2019-04-26 RX ORDER — SODIUM CHLORIDE 9 MG/ML
125 INJECTION, SOLUTION INTRAVENOUS CONTINUOUS
Status: DISCONTINUED | OUTPATIENT
Start: 2019-04-26 | End: 2019-04-26 | Stop reason: HOSPADM

## 2019-04-26 RX ADMIN — LIDOCAINE HYDROCHLORIDE: 20 JELLY TOPICAL at 14:00

## 2019-04-26 RX ADMIN — SODIUM CHLORIDE 125 ML/HR: 0.9 INJECTION, SOLUTION INTRAVENOUS at 14:46

## 2019-04-29 ENCOUNTER — TELEPHONE (OUTPATIENT)
Dept: UROLOGY | Facility: AMBULATORY SURGERY CENTER | Age: 84
End: 2019-04-29

## 2019-05-02 ENCOUNTER — PROCEDURE VISIT (OUTPATIENT)
Dept: UROLOGY | Facility: CLINIC | Age: 84
End: 2019-05-02
Payer: COMMERCIAL

## 2019-05-02 VITALS
HEIGHT: 71 IN | HEART RATE: 88 BPM | WEIGHT: 244.2 LBS | DIASTOLIC BLOOD PRESSURE: 88 MMHG | SYSTOLIC BLOOD PRESSURE: 138 MMHG | BODY MASS INDEX: 34.19 KG/M2

## 2019-05-02 DIAGNOSIS — N40.1 BENIGN PROSTATIC HYPERPLASIA WITH LOWER URINARY TRACT SYMPTOMS, SYMPTOM DETAILS UNSPECIFIED: Primary | ICD-10-CM

## 2019-05-02 DIAGNOSIS — R33.9 URINARY RETENTION: ICD-10-CM

## 2019-05-02 DIAGNOSIS — R33.9 URINARY RETENTION: Primary | ICD-10-CM

## 2019-05-02 LAB — POST-VOID RESIDUAL VOLUME, ML POC: 450 ML

## 2019-05-02 PROCEDURE — 51798 US URINE CAPACITY MEASURE: CPT | Performed by: UROLOGY

## 2019-05-02 PROCEDURE — 76872 US TRANSRECTAL: CPT | Performed by: UROLOGY

## 2019-05-02 PROCEDURE — 51702 INSERT TEMP BLADDER CATH: CPT | Performed by: UROLOGY

## 2019-05-02 PROCEDURE — 99214 OFFICE O/P EST MOD 30 MIN: CPT | Performed by: UROLOGY

## 2019-05-06 ENCOUNTER — TELEPHONE (OUTPATIENT)
Dept: UROLOGY | Facility: AMBULATORY SURGERY CENTER | Age: 84
End: 2019-05-06

## 2019-05-06 ENCOUNTER — ANESTHESIA EVENT (OUTPATIENT)
Dept: PERIOP | Facility: AMBULARY SURGERY CENTER | Age: 84
End: 2019-05-06
Payer: COMMERCIAL

## 2019-05-07 ENCOUNTER — TELEPHONE (OUTPATIENT)
Dept: UROLOGY | Facility: CLINIC | Age: 84
End: 2019-05-07

## 2019-05-07 ENCOUNTER — CLINICAL SUPPORT (OUTPATIENT)
Dept: URGENT CARE | Facility: CLINIC | Age: 84
End: 2019-05-07
Payer: COMMERCIAL

## 2019-05-07 DIAGNOSIS — Z01.818 PRE-OP TESTING: ICD-10-CM

## 2019-05-07 DIAGNOSIS — Z01.818 PRE-OP TESTING: Primary | ICD-10-CM

## 2019-05-07 LAB
ATRIAL RATE: 234 BPM
QRS AXIS: 17 DEGREES
QRSD INTERVAL: 104 MS
QT INTERVAL: 344 MS
QTC INTERVAL: 425 MS
T WAVE AXIS: 125 DEGREES
VENTRICULAR RATE: 92 BPM

## 2019-05-07 PROCEDURE — 93005 ELECTROCARDIOGRAM TRACING: CPT

## 2019-05-07 PROCEDURE — 93010 ELECTROCARDIOGRAM REPORT: CPT | Performed by: INTERNAL MEDICINE

## 2019-05-10 ENCOUNTER — HOSPITAL ENCOUNTER (OUTPATIENT)
Facility: AMBULARY SURGERY CENTER | Age: 84
Setting detail: OUTPATIENT SURGERY
Discharge: HOME | End: 2019-05-10
Attending: UROLOGY | Admitting: UROLOGY
Payer: COMMERCIAL

## 2019-05-10 ENCOUNTER — TELEPHONE (OUTPATIENT)
Dept: UROLOGY | Facility: CLINIC | Age: 84
End: 2019-05-10

## 2019-05-10 ENCOUNTER — ANESTHESIA (OUTPATIENT)
Dept: PERIOP | Facility: AMBULARY SURGERY CENTER | Age: 84
End: 2019-05-10
Payer: COMMERCIAL

## 2019-05-10 VITALS
HEIGHT: 71 IN | TEMPERATURE: 97.4 F | SYSTOLIC BLOOD PRESSURE: 164 MMHG | WEIGHT: 244 LBS | DIASTOLIC BLOOD PRESSURE: 80 MMHG | HEART RATE: 74 BPM | OXYGEN SATURATION: 98 % | RESPIRATION RATE: 18 BRPM | BODY MASS INDEX: 34.16 KG/M2

## 2019-05-10 DIAGNOSIS — R33.9 URINARY RETENTION: Primary | ICD-10-CM

## 2019-05-10 DIAGNOSIS — N40.1 BENIGN PROSTATIC HYPERPLASIA WITH LOWER URINARY TRACT SYMPTOMS, SYMPTOM DETAILS UNSPECIFIED: ICD-10-CM

## 2019-05-10 PROCEDURE — 52442 CYSTO INS TRNSPRSTC IMPLT EA: CPT | Performed by: UROLOGY

## 2019-05-10 PROCEDURE — 51798 US URINE CAPACITY MEASURE: CPT | Performed by: UROLOGY

## 2019-05-10 PROCEDURE — L8699 PROSTHETIC IMPLANT NOS: HCPCS | Performed by: UROLOGY

## 2019-05-10 PROCEDURE — 52441 CYSTO INSJ TRNSPRSTC 1 IMPLT: CPT | Performed by: UROLOGY

## 2019-05-10 DEVICE — IMPLANT URO PROSTATE UROLIFT: Type: IMPLANTABLE DEVICE | Site: URETHRA | Status: FUNCTIONAL

## 2019-05-10 RX ORDER — CEFAZOLIN SODIUM 2 G/50ML
2000 SOLUTION INTRAVENOUS ONCE
Status: COMPLETED | OUTPATIENT
Start: 2019-05-10 | End: 2019-05-10

## 2019-05-10 RX ORDER — METOCLOPRAMIDE HYDROCHLORIDE 5 MG/ML
10 INJECTION INTRAMUSCULAR; INTRAVENOUS ONCE AS NEEDED
Status: DISCONTINUED | OUTPATIENT
Start: 2019-05-10 | End: 2019-05-10 | Stop reason: HOSPADM

## 2019-05-10 RX ORDER — PHENAZOPYRIDINE HYDROCHLORIDE 200 MG/1
200 TABLET, FILM COATED ORAL 3 TIMES DAILY PRN
Qty: 10 TABLET | Refills: 0 | Status: SHIPPED | OUTPATIENT
Start: 2019-05-10 | End: 2019-05-13

## 2019-05-10 RX ORDER — OXYCODONE HYDROCHLORIDE 5 MG/1
5 TABLET ORAL EVERY 4 HOURS PRN
Status: DISCONTINUED | OUTPATIENT
Start: 2019-05-10 | End: 2019-05-10 | Stop reason: HOSPADM

## 2019-05-10 RX ORDER — SODIUM CHLORIDE, SODIUM LACTATE, POTASSIUM CHLORIDE, CALCIUM CHLORIDE 600; 310; 30; 20 MG/100ML; MG/100ML; MG/100ML; MG/100ML
100 INJECTION, SOLUTION INTRAVENOUS CONTINUOUS
Status: DISCONTINUED | OUTPATIENT
Start: 2019-05-10 | End: 2019-05-10 | Stop reason: HOSPADM

## 2019-05-10 RX ORDER — ONDANSETRON 2 MG/ML
4 INJECTION INTRAMUSCULAR; INTRAVENOUS ONCE AS NEEDED
Status: DISCONTINUED | OUTPATIENT
Start: 2019-05-10 | End: 2019-05-10 | Stop reason: HOSPADM

## 2019-05-10 RX ORDER — SODIUM CHLORIDE, SODIUM LACTATE, POTASSIUM CHLORIDE, CALCIUM CHLORIDE 600; 310; 30; 20 MG/100ML; MG/100ML; MG/100ML; MG/100ML
125 INJECTION, SOLUTION INTRAVENOUS CONTINUOUS
Status: DISCONTINUED | OUTPATIENT
Start: 2019-05-10 | End: 2019-05-10 | Stop reason: HOSPADM

## 2019-05-10 RX ORDER — FENTANYL CITRATE 50 UG/ML
INJECTION, SOLUTION INTRAMUSCULAR; INTRAVENOUS AS NEEDED
Status: DISCONTINUED | OUTPATIENT
Start: 2019-05-10 | End: 2019-05-10 | Stop reason: SURG

## 2019-05-10 RX ORDER — FUROSEMIDE 10 MG/ML
INJECTION INTRAMUSCULAR; INTRAVENOUS AS NEEDED
Status: DISCONTINUED | OUTPATIENT
Start: 2019-05-10 | End: 2019-05-10 | Stop reason: SURG

## 2019-05-10 RX ORDER — PROPOFOL 10 MG/ML
INJECTION, EMULSION INTRAVENOUS CONTINUOUS PRN
Status: DISCONTINUED | OUTPATIENT
Start: 2019-05-10 | End: 2019-05-10 | Stop reason: SURG

## 2019-05-10 RX ORDER — CEPHALEXIN 500 MG/1
500 CAPSULE ORAL EVERY 6 HOURS SCHEDULED
Qty: 20 CAPSULE | Refills: 0 | Status: SHIPPED | OUTPATIENT
Start: 2019-05-10 | End: 2019-05-15

## 2019-05-10 RX ORDER — LIDOCAINE HYDROCHLORIDE 10 MG/ML
0.5 INJECTION, SOLUTION EPIDURAL; INFILTRATION; INTRACAUDAL; PERINEURAL ONCE AS NEEDED
Status: DISCONTINUED | OUTPATIENT
Start: 2019-05-10 | End: 2019-05-10 | Stop reason: HOSPADM

## 2019-05-10 RX ORDER — PROMETHAZINE HYDROCHLORIDE 25 MG/ML
12.5 INJECTION, SOLUTION INTRAMUSCULAR; INTRAVENOUS ONCE AS NEEDED
Status: DISCONTINUED | OUTPATIENT
Start: 2019-05-10 | End: 2019-05-10 | Stop reason: HOSPADM

## 2019-05-10 RX ORDER — DOCUSATE SODIUM 100 MG/1
100 CAPSULE, LIQUID FILLED ORAL 2 TIMES DAILY
Qty: 30 CAPSULE | Refills: 0 | Status: SHIPPED | OUTPATIENT
Start: 2019-05-10 | End: 2019-05-25

## 2019-05-10 RX ORDER — HYDROCODONE BITARTRATE AND ACETAMINOPHEN 5; 325 MG/1; MG/1
1 TABLET ORAL EVERY 6 HOURS PRN
Qty: 5 TABLET | Refills: 0 | Status: SHIPPED | OUTPATIENT
Start: 2019-05-10 | End: 2019-05-20

## 2019-05-10 RX ORDER — MAGNESIUM HYDROXIDE 1200 MG/15ML
LIQUID ORAL AS NEEDED
Status: DISCONTINUED | OUTPATIENT
Start: 2019-05-10 | End: 2019-05-10 | Stop reason: HOSPADM

## 2019-05-10 RX ORDER — FENTANYL CITRATE/PF 50 MCG/ML
25 SYRINGE (ML) INJECTION
Status: DISCONTINUED | OUTPATIENT
Start: 2019-05-10 | End: 2019-05-10 | Stop reason: HOSPADM

## 2019-05-10 RX ADMIN — FENTANYL CITRATE 100 MCG: 50 INJECTION, SOLUTION INTRAMUSCULAR; INTRAVENOUS at 10:34

## 2019-05-10 RX ADMIN — CEFAZOLIN SODIUM 2000 MG: 2 SOLUTION INTRAVENOUS at 10:34

## 2019-05-10 RX ADMIN — FUROSEMIDE 20 MG: 10 INJECTION INTRAMUSCULAR; INTRAVENOUS at 11:00

## 2019-05-10 RX ADMIN — SODIUM CHLORIDE, SODIUM LACTATE, POTASSIUM CHLORIDE, AND CALCIUM CHLORIDE: .6; .31; .03; .02 INJECTION, SOLUTION INTRAVENOUS at 10:32

## 2019-05-10 RX ADMIN — PROPOFOL 120 MCG/KG/MIN: 10 INJECTION, EMULSION INTRAVENOUS at 10:36

## 2019-05-10 NOTE — ANESTHESIA POSTPROCEDURE EVALUATION
Post-Op Assessment Note    CV Status:  Stable    Pain management: adequate     Mental Status:  Somnolent   Hydration Status:  Stable   PONV Controlled:  None   Airway Patency:  Patent   Post Op Vitals Reviewed: Yes      Staff: Anesthesiologist           /78 (05/10/19 1108)    Temp 97.8 °F (36.6 °C) (05/10/19 1108)    Pulse 90 (05/10/19 1108)   Resp 12 (05/10/19 1108)    SpO2 97 % (05/10/19 1108)

## 2019-05-10 NOTE — ANESTHESIA PREPROCEDURE EVALUATION
Review of Systems/Medical History  Patient summary reviewed  Chart reviewed  No history of anesthetic complications     Cardiovascular  EKG reviewed, Exercise tolerance (METS): <4,  Hypertension on > 1 medication, Dysrhythmias , atrial fibrillation, CHF , NYHA Classification: III compensated CHF,    Pulmonary  Negative pulmonary ROS        GI/Hepatic    GERD ,        Kidney disease CKD,        Endo/Other    Obesity    GYN  Negative gynecology ROS          Hematology   Musculoskeletal    Arthritis     Neurology    Diabetic neuropathy,   Comment: Macular degeneration Psychology   Anxiety, Depression ,              Physical Exam    Airway    Mallampati score: II  TM Distance: >3 FB  Neck ROM: full     Dental   upper dentures and lower dentures,     Cardiovascular      Pulmonary      Other Findings        Anesthesia Plan  ASA Score- 3     Anesthesia Type- IV sedation with anesthesia with ASA Monitors.   Additional Monitors:   Airway Plan:         Plan Factors- Patient instructed to abstain from smoking on day of procedure. Patient did not smoke on day of surgery.    Induction-     Postoperative Plan-     Informed Consent- Anesthetic plan and risks discussed with patient.  I personally reviewed this patient with the CRNA. Discussed and agreed on the Anesthesia Plan with the CRNA..

## 2019-05-25 DIAGNOSIS — R13.10 DYSPHAGIA, UNSPECIFIED TYPE: ICD-10-CM

## 2019-05-25 RX ORDER — PANTOPRAZOLE SODIUM 40 MG/1
40 TABLET, DELAYED RELEASE ORAL
Qty: 90 TABLET | Refills: 0 | Status: SHIPPED | OUTPATIENT
Start: 2019-05-25 | End: 2019-08-22 | Stop reason: SDUPTHER

## 2019-05-28 ENCOUNTER — TELEPHONE (OUTPATIENT)
Dept: GASTROENTEROLOGY | Facility: AMBULARY SURGERY CENTER | Age: 84
End: 2019-05-28

## 2019-06-07 ENCOUNTER — OFFICE VISIT (OUTPATIENT)
Dept: CARDIOLOGY CLINIC | Facility: CLINIC | Age: 84
End: 2019-06-07
Payer: COMMERCIAL

## 2019-06-07 VITALS
WEIGHT: 237.8 LBS | SYSTOLIC BLOOD PRESSURE: 142 MMHG | HEART RATE: 61 BPM | BODY MASS INDEX: 33.29 KG/M2 | DIASTOLIC BLOOD PRESSURE: 60 MMHG | HEIGHT: 71 IN | OXYGEN SATURATION: 96 %

## 2019-06-07 DIAGNOSIS — I50.22 CHRONIC SYSTOLIC CHF (CONGESTIVE HEART FAILURE) (HCC): Chronic | ICD-10-CM

## 2019-06-07 DIAGNOSIS — I42.8 NICM (NONISCHEMIC CARDIOMYOPATHY) (HCC): ICD-10-CM

## 2019-06-07 DIAGNOSIS — I48.91 ATRIAL FIBRILLATION, UNSPECIFIED TYPE (HCC): Primary | ICD-10-CM

## 2019-06-07 DIAGNOSIS — I10 ESSENTIAL HYPERTENSION: ICD-10-CM

## 2019-06-07 PROCEDURE — 93000 ELECTROCARDIOGRAM COMPLETE: CPT | Performed by: INTERNAL MEDICINE

## 2019-06-07 PROCEDURE — 99214 OFFICE O/P EST MOD 30 MIN: CPT | Performed by: INTERNAL MEDICINE

## 2019-07-16 ENCOUNTER — APPOINTMENT (OUTPATIENT)
Dept: LAB | Facility: CLINIC | Age: 84
End: 2019-07-16
Payer: COMMERCIAL

## 2019-07-16 DIAGNOSIS — I10 ESSENTIAL HYPERTENSION: ICD-10-CM

## 2019-07-16 DIAGNOSIS — N18.30 CKD (CHRONIC KIDNEY DISEASE) STAGE 3, GFR 30-59 ML/MIN (HCC): ICD-10-CM

## 2019-07-16 LAB
ALBUMIN SERPL BCP-MCNC: 3.8 G/DL (ref 3.5–5)
ALP SERPL-CCNC: 66 U/L (ref 46–116)
ALT SERPL W P-5'-P-CCNC: 25 U/L (ref 12–78)
ANION GAP SERPL CALCULATED.3IONS-SCNC: 6 MMOL/L (ref 4–13)
AST SERPL W P-5'-P-CCNC: 21 U/L (ref 5–45)
BACTERIA UR QL AUTO: ABNORMAL /HPF
BILIRUB SERPL-MCNC: 0.71 MG/DL (ref 0.2–1)
BILIRUB UR QL STRIP: NEGATIVE
BUN SERPL-MCNC: 17 MG/DL (ref 5–25)
CALCIUM SERPL-MCNC: 9 MG/DL (ref 8.3–10.1)
CHLORIDE SERPL-SCNC: 102 MMOL/L (ref 100–108)
CLARITY UR: CLEAR
CO2 SERPL-SCNC: 30 MMOL/L (ref 21–32)
COLOR UR: YELLOW
CREAT SERPL-MCNC: 1.26 MG/DL (ref 0.6–1.3)
CREAT UR-MCNC: 127 MG/DL
ERYTHROCYTE [DISTWIDTH] IN BLOOD BY AUTOMATED COUNT: 15.4 % (ref 11.6–15.1)
GFR SERPL CREATININE-BSD FRML MDRD: 52 ML/MIN/1.73SQ M
GLUCOSE SERPL-MCNC: 103 MG/DL (ref 65–140)
GLUCOSE UR STRIP-MCNC: NEGATIVE MG/DL
HCT VFR BLD AUTO: 41.5 % (ref 36.5–49.3)
HGB BLD-MCNC: 13 G/DL (ref 12–17)
HGB UR QL STRIP.AUTO: ABNORMAL
KETONES UR STRIP-MCNC: NEGATIVE MG/DL
LEUKOCYTE ESTERASE UR QL STRIP: NEGATIVE
MAGNESIUM SERPL-MCNC: 2.4 MG/DL (ref 1.6–2.6)
MCH RBC QN AUTO: 28.5 PG (ref 26.8–34.3)
MCHC RBC AUTO-ENTMCNC: 31.3 G/DL (ref 31.4–37.4)
MCV RBC AUTO: 91 FL (ref 82–98)
NITRITE UR QL STRIP: NEGATIVE
NON-SQ EPI CELLS URNS QL MICRO: ABNORMAL /HPF
PH UR STRIP.AUTO: 5.5 [PH]
PHOSPHATE SERPL-MCNC: 3.7 MG/DL (ref 2.3–4.1)
PLATELET # BLD AUTO: 301 THOUSANDS/UL (ref 149–390)
PMV BLD AUTO: 10.8 FL (ref 8.9–12.7)
POTASSIUM SERPL-SCNC: 4.2 MMOL/L (ref 3.5–5.3)
PROT SERPL-MCNC: 7.6 G/DL (ref 6.4–8.2)
PROT UR STRIP-MCNC: NEGATIVE MG/DL
PROT UR-MCNC: 12 MG/DL
PROT/CREAT UR: 0.09 MG/G{CREAT} (ref 0–0.1)
PTH-INTACT SERPL-MCNC: 75.6 PG/ML (ref 18.4–80.1)
RBC # BLD AUTO: 4.56 MILLION/UL (ref 3.88–5.62)
RBC #/AREA URNS AUTO: ABNORMAL /HPF
SODIUM SERPL-SCNC: 138 MMOL/L (ref 136–145)
SP GR UR STRIP.AUTO: 1.02 (ref 1–1.03)
UROBILINOGEN UR QL STRIP.AUTO: 0.2 E.U./DL
WBC # BLD AUTO: 7.96 THOUSAND/UL (ref 4.31–10.16)
WBC #/AREA URNS AUTO: ABNORMAL /HPF

## 2019-07-16 PROCEDURE — 84100 ASSAY OF PHOSPHORUS: CPT

## 2019-07-16 PROCEDURE — 83735 ASSAY OF MAGNESIUM: CPT

## 2019-07-16 PROCEDURE — 81001 URINALYSIS AUTO W/SCOPE: CPT

## 2019-07-16 PROCEDURE — 85027 COMPLETE CBC AUTOMATED: CPT

## 2019-07-16 PROCEDURE — 84156 ASSAY OF PROTEIN URINE: CPT

## 2019-07-16 PROCEDURE — 82570 ASSAY OF URINE CREATININE: CPT

## 2019-07-16 PROCEDURE — 83970 ASSAY OF PARATHORMONE: CPT

## 2019-07-16 PROCEDURE — 80053 COMPREHEN METABOLIC PANEL: CPT

## 2019-07-16 PROCEDURE — 36415 COLL VENOUS BLD VENIPUNCTURE: CPT

## 2019-08-05 ENCOUNTER — EVALUATION (OUTPATIENT)
Dept: PHYSICAL THERAPY | Age: 84
End: 2019-08-05
Payer: COMMERCIAL

## 2019-08-05 DIAGNOSIS — A88.1 EPIDEMIC VERTIGO: Primary | ICD-10-CM

## 2019-08-05 DIAGNOSIS — Z74.09 IMPAIRED FUNCTIONAL MOBILITY, BALANCE, GAIT, AND ENDURANCE: ICD-10-CM

## 2019-08-05 PROCEDURE — 97116 GAIT TRAINING THERAPY: CPT | Performed by: PHYSICAL THERAPIST

## 2019-08-05 PROCEDURE — 97163 PT EVAL HIGH COMPLEX 45 MIN: CPT | Performed by: PHYSICAL THERAPIST

## 2019-08-05 NOTE — PROGRESS NOTES
PT Evaluation     Today's date: 2019  Patient name: Margoth Antonio  : 1935  MRN: [id number]  Referring provider: Golden Durán DO  Dx:   Encounter Diagnosis     ICD-10-CM    1  Epidemic vertigo A88 1    2  Impaired functional mobility, balance, gait, and endurance Z74 09        Start Time: 1430  Stop Time: 1530  Total time in clinic (min): 60 minutes    Assessment  Assessment details: Margoth Atnonio is a 80 y o  male who presents with decreased strength, decreased ROM, decreased joint mobility, ambulatory dysfunction and postural  dysfunction  Due to these impairments, Patient has difficulty performing a/iadls  Patient's clinical presentation is not consistent with their referring diagnosis of vertigo  Patient was negative for walker hallpike maneuver with difficulty to perform  Patient demonstrates gait and balance dysfunction with gross weakness  PT diagnosis of impaired gait, balance, endurance, and mobility  Patient has macular degeneration also that effects his balance  Patient would benefit from skilled physical therapy to address their aforementioned impairments, improve their level of function and to improve their overall quality of life, however I believe patient would benefit from Home care first to work on safety and function in his home  Plan to speak with MD regarding patient situation and PT finding today, and to discuss patient POC further  Impairments: abnormal gait, abnormal or restricted ROM, activity intolerance, impaired balance, impaired physical strength, lacks appropriate home exercise program, pain with function, safety issue, poor posture  and poor body mechanics  Barriers to therapy: Advanced age, macular degeneration  Understanding of Dx/Px/POC: good   Prognosis: fair    Goals  ST-3 WEEKS  1  Improve balance with DLS on level surface to good  2  Improve ambulation with safe technique and appropriate AD in home  LT-6 WEEKS  1   Patient to be independent with a/iadls  2  Increase functional activities for leisure and home activities to previous LOF  3  Independent with HEP and/or fitness program     Plan  Plan details: Plan to speak with MD regarding patient POC and referral to home care in place of OPT  Patient would benefit from: skilled physical therapy  Planned therapy interventions: activity modification, body mechanics training, functional ROM exercises, home exercise program, IADL retraining, neuromuscular re-education, patient education, postural training, strengthening, therapeutic activities, therapeutic exercise, abdominal trunk stabilization, flexibility, stretching, balance and gait training  Frequency: 1-2x/wk  Duration in weeks: 8  Plan of Care beginning date: 8/5/2019  Plan of Care expiration date: 10/5/2019  Treatment plan discussed with: patient and family        Subjective Evaluation    History of Present Illness  Mechanism of injury: Patient feels he is just getting old, and having trouble getting around  Patient denies any falls  Patient comes to PT with his DIL/caregiver  She doesn't believe patient has vertigo, just weak muscles  Recurrent probem    Quality of life: poor    Pain  Location: bilateral hands today  Progression: worsening    Social Support  Lives with: DIL, other adults in home  Employment status: not working  Patient Goals  Patient goals for therapy: improved balance, increased strength and independence with ADLs/IADLs  Patient goal: safe and get around better  Objective     Concurrent Complaints  Positive for visual change (macular degeneration)  Negative for nausea/motion sickness, tinnitus, hearing loss and poor concentration    Static Posture   General Observations  Flexed  Head  Forward  Shoulders  Rounded  Comments  Significant large protruding abdomen     Forward flexed neck    Postural Observations  Seated posture: poor  Standing posture: poor  Correction of posture: has no consistent effect        Active Range of Motion     Additional Active Range of Motion Details  C/s AROM limited due to age and posture  Ambulation   Weight-Bearing Status   Assistive device used: single point cane    Additional Weight-Bearing Status Details  Patient states he uses walkers at home at times but not to go out as too bulky  Ambulation: Level Surfaces   Ambulation with assistive device: moderate assist    Observational Gait   Gait: crouched   Decreased walking speed and stride length  Additional Observational Gait Details  Guarded with ambulation even with assistance, possibly due to impaired vision  Quality of Movement During Gait   Trunk  Forward lean  Neuro Exam:     Dizziness  Positive for disequilibrium  Negative for vertigo, oscillopsia, motion sickness, light-headedness, rocking or swaying, diplopia and floating or swimming  Exacerbating factors  Positive for walking and walking in busy environment  Negative for bending over, rolling in bed, looking up, turning head, supine to/from sitting and optokinetic movement  Oculomotor exam   Oculomotor ROM: WNL  Resting nystagmus: not present   Gaze holding nystagmus: present left  Gaze holding nystagmus: not present right  Head thrust: left normal and right normal    Positional testing   Sinton-Hallpike   Left posterior canal: WNL  Positional testing comment: Patient difficult to perform test due to size, age, decreased neck extension ROM, pain with lying supine, needed mod assistance with mobility  Functional outcomes   Functional outcome comment: TUG with SPC and Mod assist > 60 seconds        Flowsheet Rows      Most Recent Value   PT/OT G-Codes   Current Score  16   Projected Score  48   Assessment Type  Evaluation             Precautions: anxiety, OA, A-fib, Depression, GERD, HTN, macular degeneration, CHF          Exercise Diary  8/5            Gt train 15'

## 2019-08-05 NOTE — LETTER
2019    [address] [address]    Patient: Alfred Jones   YOB: 1935   Date of Visit: 2019     Encounter Diagnosis     ICD-10-CM    1  Epidemic vertigo A88 1    2  Impaired functional mobility, balance, gait, and endurance Z74 09        Dear Dr Marycruz Dhaliwal:    Thank you for your recent referral of Alfred Jones  Please review the attached evaluation summary from Matthias's recent visit  Please verify that you agree with the plan of care by signing the attached order  If you have any questions or concerns, please do not hesitate to call  I sincerely appreciate the opportunity to share in the care of one of your patients and hope to have another opportunity to work with you in the near future  Sincerely,    Vipul Sales, PT      Referring Provider:      I certify that I have read the below Plan of Care and certify the need for these services furnished under this plan of treatment while under my care  [address] [address]  VIA Facsimile: [phone]          PT Evaluation     Today's date: 2019  Patient name: Alfred Jones  : 1935  MRN: [id number]  Referring provider: Raulito Briscoe DO  Dx:   Encounter Diagnosis     ICD-10-CM    1  Epidemic vertigo A88 1    2  Impaired functional mobility, balance, gait, and endurance Z74 09        Start Time: 1430  Stop Time: 1530  Total time in clinic (min): 60 minutes    Assessment  Assessment details: Alfred Jones is a 80 y o  male who presents with decreased strength, decreased ROM, decreased joint mobility, ambulatory dysfunction and postural  dysfunction  Due to these impairments, Patient has difficulty performing a/iadls  Patient's clinical presentation is not consistent with their referring diagnosis of vertigo  Patient was negative for walker hallpike maneuver with difficulty to perform   Patient demonstrates gait and balance dysfunction with gross weakness  PT diagnosis of impaired gait, balance, endurance, and mobility  Patient has macular degeneration also that effects his balance  Patient would benefit from skilled physical therapy to address their aforementioned impairments, improve their level of function and to improve their overall quality of life, however I believe patient would benefit from Home care first to work on safety and function in his home  Plan to speak with MD regarding patient situation and PT finding today, and to discuss patient POC further  Impairments: abnormal gait, abnormal or restricted ROM, activity intolerance, impaired balance, impaired physical strength, lacks appropriate home exercise program, pain with function, safety issue, poor posture  and poor body mechanics  Barriers to therapy: Advanced age, macular degeneration  Understanding of Dx/Px/POC: good   Prognosis: fair    Goals  ST-3 WEEKS  1  Improve balance with DLS on level surface to good  2  Improve ambulation with safe technique and appropriate AD in home  LT-6 WEEKS  1  Patient to be independent with a/iadls  2  Increase functional activities for leisure and home activities to previous LOF  3  Independent with HEP and/or fitness program     Plan  Plan details: Plan to speak with MD regarding patient POC and referral to home care in place of OPT  Patient would benefit from: skilled physical therapy  Planned therapy interventions: activity modification, body mechanics training, functional ROM exercises, home exercise program, IADL retraining, neuromuscular re-education, patient education, postural training, strengthening, therapeutic activities, therapeutic exercise, abdominal trunk stabilization, flexibility, stretching, balance and gait training  Frequency: 1-2x/wk    Duration in weeks: 8  Plan of Care beginning date: 2019  Plan of Care expiration date: 10/5/2019  Treatment plan discussed with: patient and family        Subjective Evaluation    History of Present Illness  Mechanism of injury: Patient feels he is just getting old, and having trouble getting around  Patient denies any falls  Patient comes to PT with his DIL/caregiver  She doesn't believe patient has vertigo, just weak muscles  Recurrent probem    Quality of life: poor    Pain  Location: bilateral hands today  Progression: worsening    Social Support  Lives with: DIL, other adults in home  Employment status: not working  Patient Goals  Patient goals for therapy: improved balance, increased strength and independence with ADLs/IADLs  Patient goal: safe and get around better  Objective     Concurrent Complaints  Positive for visual change (macular degeneration)  Negative for nausea/motion sickness, tinnitus, hearing loss and poor concentration    Static Posture   General Observations  Flexed  Head  Forward  Shoulders  Rounded  Comments  Significant large protruding abdomen  Forward flexed neck    Postural Observations  Seated posture: poor  Standing posture: poor  Correction of posture: has no consistent effect        Active Range of Motion     Additional Active Range of Motion Details  C/s AROM limited due to age and posture  Ambulation   Weight-Bearing Status   Assistive device used: single point cane    Additional Weight-Bearing Status Details  Patient states he uses walkers at home at times but not to go out as too bulky  Ambulation: Level Surfaces   Ambulation with assistive device: moderate assist    Observational Gait   Gait: crouched   Decreased walking speed and stride length  Additional Observational Gait Details  Guarded with ambulation even with assistance, possibly due to impaired vision  Quality of Movement During Gait   Trunk  Forward lean  Neuro Exam:     Dizziness  Positive for disequilibrium     Negative for vertigo, oscillopsia, motion sickness, light-headedness, rocking or swaying, diplopia and floating or swimming  Exacerbating factors  Positive for walking and walking in busy environment  Negative for bending over, rolling in bed, looking up, turning head, supine to/from sitting and optokinetic movement  Oculomotor exam   Oculomotor ROM: WNL  Resting nystagmus: not present   Gaze holding nystagmus: present left  Gaze holding nystagmus: not present right  Head thrust: left normal and right normal    Positional testing   Cristiane-Hallpike   Left posterior canal: WNL  Positional testing comment: Patient difficult to perform test due to size, age, decreased neck extension ROM, pain with lying supine, needed mod assistance with mobility  Functional outcomes   Functional outcome comment: TUG with SPC and Mod assist > 60 seconds        Flowsheet Rows      Most Recent Value   PT/OT G-Codes   Current Score  16   Projected Score  48   Assessment Type  Evaluation             Precautions: anxiety, OA, A-fib, Depression, GERD, HTN, macular degeneration, CHF          Exercise Diary  8/5            Gt train 15'

## 2019-08-06 ENCOUNTER — TRANSCRIBE ORDERS (OUTPATIENT)
Dept: PHYSICAL THERAPY | Age: 84
End: 2019-08-06

## 2019-08-06 DIAGNOSIS — Z74.09 IMPAIRED FUNCTIONAL MOBILITY, BALANCE, GAIT, AND ENDURANCE: ICD-10-CM

## 2019-08-06 DIAGNOSIS — A88.1 EPIDEMIC VERTIGO: Primary | ICD-10-CM

## 2019-08-22 DIAGNOSIS — R13.10 DYSPHAGIA, UNSPECIFIED TYPE: ICD-10-CM

## 2019-08-23 RX ORDER — PANTOPRAZOLE SODIUM 40 MG/1
40 TABLET, DELAYED RELEASE ORAL
Qty: 90 TABLET | Refills: 0 | Status: SHIPPED | OUTPATIENT
Start: 2019-08-23 | End: 2019-11-17 | Stop reason: SDUPTHER

## 2019-08-26 NOTE — PROGRESS NOTES
There are no diagnoses linked to this encounter  Assessment and plan:       1  Status post Urolift performed on 05/10/2019 by Dr Sean Hernandez  - Preoperatively patient was catheter dependent  - Patient is very happy with his result status post Urolift  His current AUA symptom score is 0 with an overall bother score of 2  Unfortunately, patient continues to have an elevated PVR of 220 mL  -  He will return approximately 3 months for symptom reassessment and repeat uroflow and PVR  Jamarcus Morgan PA-C      Chief Complaint     Chief Complaint   Patient presents with    Urinary Frequency         History of Present Illness     Maddison Crabtree is a 80 y o  male patient who underwent Urolift on 05/10/2019 performed by Dr Sean Hernandez  Preoperatively patient was catheter dependent  AUA symptom score today is 0 with an overall bother score of 2  Uroflow Findings:  Voided Volume:                                   80 0 mL  Maximum flow (Qmax):                       8 0 mL/s  Average flow:                                       2 7 mL/S  Voiding time:                                       44 0 s  Flow time:                                            24 1 s  Time to peak flow:                               42 5 s      PVR       220 mL    Patient is delighted with his results following Urolift 05/10/2019  Laboratory     Lab Results   Component Value Date    CREATININE 1 26 07/16/2019       No results found for: PSA    Recent Results (from the past 1 hour(s))   POCT Measure PVR    Collection Time: 08/27/19 11:24 AM   Result Value Ref Range    POST-VOID RESIDUAL VOLUME, ML  mL         Review of Systems     Review of Systems   Constitutional: Negative for chills and fever  Respiratory: Negative for shortness of breath  Cardiovascular: Negative for chest pain  Gastrointestinal: Positive for constipation  Negative for diarrhea, nausea and vomiting     Genitourinary: Negative for difficulty urinating, dysuria, enuresis, flank pain, frequency, hematuria and urgency  Musculoskeletal: Positive for arthralgias and gait problem  Allergies     No Known Allergies    Physical Exam     Physical Exam   Constitutional: He is oriented to person, place, and time  He appears well-developed and well-nourished  No distress  HENT:   Head: Normocephalic and atraumatic  Right Ear: External ear normal    Left Ear: External ear normal    Nose: Nose normal    Eyes: Right eye exhibits no discharge  Left eye exhibits no discharge  No scleral icterus  Cardiovascular: Normal rate  Pulmonary/Chest: Effort normal    Musculoskeletal:   Walks with difficulty using 4 wheel walker   Neurological: He is alert and oriented to person, place, and time  Skin: Skin is warm and dry  He is not diaphoretic  Psychiatric: He has a normal mood and affect  His behavior is normal  Judgment and thought content normal    Nursing note and vitals reviewed          Vital Signs     Vitals:    08/27/19 1120   BP: 110/78   BP Location: Left arm   Patient Position: Sitting   Cuff Size: Adult   Pulse: 78   Weight: 104 kg (229 lb)   Height: 5' 10 5" (1 791 m)         Current Medications       Current Outpatient Medications:     aspirin (ECOTRIN LOW STRENGTH) 81 mg EC tablet, Take 81 mg by mouth daily  , Disp: , Rfl:     cholecalciferol (VITAMIN D3) 1,000 units tablet, Take 1 tablet (1,000 Units total) by mouth daily, Disp: 30 tablet, Rfl: 0    digoxin (LANOXIN) 0 125 mg tablet, Take 1 tablet (125 mcg total) by mouth daily, Disp: 90 tablet, Rfl: 3    ELIQUIS 2 5 MG, Take 2 5 mg by mouth 2 (two) times a day, Disp: , Rfl: 2    escitalopram (LEXAPRO) 10 mg tablet, Take 1 tablet (10 mg total) by mouth daily, Disp: 30 tablet, Rfl: 0    finasteride (PROSCAR) 5 mg tablet, Take 1 tablet (5 mg total) by mouth daily, Disp: 90 tablet, Rfl: 2    lisinopril (ZESTRIL) 2 5 mg tablet, Take 2 5 mg by mouth daily, Disp: , Rfl: 3   loratadine (CLARITIN) 10 mg tablet, Take by mouth daily as needed  , Disp: , Rfl:     metoprolol tartrate (LOPRESSOR) 100 mg tablet, Take 1 tablet (100 mg total) by mouth every 12 (twelve) hours (Patient taking differently: Take 50 mg by mouth every 12 (twelve) hours  ), Disp: 60 tablet, Rfl: 0    NON FORMULARY, , Disp: , Rfl:     oxybutynin (DITROPAN) 5 mg tablet, , Disp: , Rfl:     pantoprazole (PROTONIX) 40 mg tablet, TAKE 1 TABLET (40 MG TOTAL) BY MOUTH DAILY IN THE EARLY MORNING, Disp: , Rfl: 2    pantoprazole (PROTONIX) 40 mg tablet, TAKE 1 TABLET (40 MG TOTAL) BY MOUTH DAILY IN THE EARLY MORNING, Disp: 90 tablet, Rfl: 0    polyethylene glycol (MIRALAX) 17 g packet, Take 17 g by mouth daily, Disp: 14 each, Rfl: 0    torsemide (DEMADEX) 10 mg tablet, Take 1 tablet (10 mg total) by mouth daily, Disp: 30 tablet, Rfl: 0    bisacodyl (DULCOLAX) 10 mg suppository, Insert 1 suppository (10 mg total) into the rectum once for 1 dose, Disp: 12 suppository, Rfl: 0    docusate sodium (COLACE) 100 mg capsule, Take 1 capsule (100 mg total) by mouth 2 (two) times a day for 15 days, Disp: 30 capsule, Rfl: 0      Active Problems     Patient Active Problem List   Diagnosis    Acute kidney injury superimposed on chronic kidney disease (HCC)    Atrial fibrillation ([facility])    Chronic systolic CHF (congestive heart failure) ([facility])    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    Depression    Hyponatremia    Azotemia    Arthritis    Legally blind    Peripheral neuropathy    NICM (nonischemic cardiomyopathy) ([facility])    Other proteinuria    Essential hypertension    Gross hematuria    Urinary retention    Constipation    Benign prostatic hyperplasia with lower urinary tract symptoms    CKD (chronic kidney disease) stage 3, GFR 30-59 ml/min ([facility])         Past Medical History     Past Medical History:   Diagnosis Date    Anxiety     Arthritis     Atrial fibrillation (HCC)     Depression     GERD (gastroesophageal reflux disease)     Hypertension     Irregular heart beat     Macular degeneration          Surgical History     Past Surgical History:   Procedure Laterality Date    COLONOSCOPY      ESOPHAGOGASTRODUODENOSCOPY N/A 2/15/2018    Procedure: ESOPHAGOGASTRODUODENOSCOPY (EGD);   Surgeon: Soledad Dukes MD;  [facility];  Service: Gastroenterology    MULTIPLE TOOTH EXTRACTIONS      CT CYSTOURETHRO W/IMPLANT N/A 5/10/2019    Procedure: Starr Dunn;  Surgeon: Lorene Hernandez MD;  [facility]: [facility];  Service: Urology         Family History     Family History   Problem Relation Age of Onset    Cancer Mother     Cancer Father     Heart failure Brother         jeanine     Heart attack Neg Hx     Stroke Neg Hx     Anuerysm Neg Hx     Clotting disorder Neg Hx     Arrhythmia Neg Hx     Sudden death Neg Hx         scd    Coronary artery disease Neg Hx     Hypertension Neg Hx     Hyperlipidemia Neg Hx          Social History     Social History       Radiology

## 2019-08-27 ENCOUNTER — OFFICE VISIT (OUTPATIENT)
Dept: GASTROENTEROLOGY | Facility: AMBULARY SURGERY CENTER | Age: 84
End: 2019-08-27
Payer: COMMERCIAL

## 2019-08-27 ENCOUNTER — OFFICE VISIT (OUTPATIENT)
Dept: UROLOGY | Facility: CLINIC | Age: 84
End: 2019-08-27
Payer: COMMERCIAL

## 2019-08-27 VITALS
RESPIRATION RATE: 18 BRPM | DIASTOLIC BLOOD PRESSURE: 70 MMHG | HEIGHT: 71 IN | WEIGHT: 228.4 LBS | SYSTOLIC BLOOD PRESSURE: 120 MMHG | BODY MASS INDEX: 31.98 KG/M2 | HEART RATE: 64 BPM | TEMPERATURE: 97.9 F

## 2019-08-27 VITALS
BODY MASS INDEX: 32.06 KG/M2 | HEIGHT: 71 IN | HEART RATE: 78 BPM | WEIGHT: 229 LBS | SYSTOLIC BLOOD PRESSURE: 110 MMHG | DIASTOLIC BLOOD PRESSURE: 78 MMHG

## 2019-08-27 DIAGNOSIS — Z87.19 HISTORY OF DYSPHAGIA: ICD-10-CM

## 2019-08-27 DIAGNOSIS — K59.09 CHRONIC CONSTIPATION: ICD-10-CM

## 2019-08-27 DIAGNOSIS — Z87.11 HISTORY OF BLEEDING PEPTIC ULCER: Primary | ICD-10-CM

## 2019-08-27 DIAGNOSIS — N40.1 BENIGN PROSTATIC HYPERPLASIA WITH LOWER URINARY TRACT SYMPTOMS, SYMPTOM DETAILS UNSPECIFIED: Primary | ICD-10-CM

## 2019-08-27 LAB — POST-VOID RESIDUAL VOLUME, ML POC: 220 ML

## 2019-08-27 PROCEDURE — 51798 US URINE CAPACITY MEASURE: CPT | Performed by: PHYSICIAN ASSISTANT

## 2019-08-27 PROCEDURE — 99213 OFFICE O/P EST LOW 20 MIN: CPT | Performed by: PHYSICIAN ASSISTANT

## 2019-08-27 RX ORDER — APIXABAN 2.5 MG/1
2.5 TABLET, FILM COATED ORAL 2 TIMES DAILY
Refills: 2 | COMMUNITY
Start: 2019-08-07

## 2019-08-27 NOTE — PROGRESS NOTES
Assessment and Plan    #1  History of bleeding PUD and dysphagia: on PPI once daily  asymptomatic  -discussed with patient's daughter and patient in regards to continuing PPI versus transitioning to H2 blocker  Patient is currently asymptomatic and did have significant bleeding from peptic ulcer a year ago  He is still on Eliquis as well  After discussion we decided to keep patient on PPI as a believe the benefits to prevent peptic ulcer disease with bleeding or dysphagia outweighs the risks at his age    #2  Chronic constipation:  Patient has issues with incomplete evacuation and feeling urgency to move his bowels without being able to go to the bathroom  He sometimes has to strain  It appears that this is been going on for quite some time  He is to take Dulcolax  He reports that recently he tried MiraLax for 3 days in a row which ended up causing him diarrhea  Suspect this is secondary to decreased hydration as well as decreased motility of the patient  Colon polyps or cancer is also on the differential as well as patient has no other alarm symptoms such as weight loss or blood in the stool   -had a long discussion with the patient and his daughter  They are not interested in any invasive procedures such as colonoscopy at this time  Patient is very debilitated in regards to his physical health due to arthritis  He reports that he would not want treatment for cancer even if he did have it   -I recommended trying to do a fiber supplement 2 tbsp daily  I did discuss that he could continue to use MiraLax or Dulcolax as needed but could take this every other day or every 3rd day to avoid diarrhea   -call with any concerns or questions      Follow-up in 1 year or sooner if needed  --------------------------------------------------------------------------------------------------------------------    Chief Complaint:  Follow-up chronic constipation, PPI use, history of duodenal ulcer with bleeding    HPI: Pierre Callaway is a 80 y o  male with severe arthritis who uses a walker who presents with his daughter today for follow up for PPI use, history of duodenal ulcer with bleeding, and chronic constipation  Patient was seen last year in 2018 in the hospital secondary to food impaction as well as bleeding duodenal ulcer which was treated with hemoclip, APC, and epinephrine injection  He is on Eliquis  He has been on PPI once daily since that time  He reports he is doing well with this  Denies any black tarry stools, nausea, vomiting, or difficulty swallowing  He reports he is eating well and denies any weight loss  He denies any abdominal pain  He reports history of chronic constipation  More recently he reports that at times he will feel an urgency to go to the bathroom but by the time he gets to the toilet he no longer feels like he can go  Sometimes he will go small amount but feels he incompletely evacuates  He recently tried MiraLax 3 days in a row and subsequently had diarrhea  He used to use Dulcolax  He denies any blood in his stool  He does report that his mother had colon cancer from which she  from      His last colonoscopy was many years ago    Review of Systems:   General: negative for fatigue, fever, night sweats or unexpected weight loss  Psychological: negative for anxiety or depression  Ophthalmic: negative for blurry vision or scleral icterus; +loos of vision  ENT: negative for headaches, oral lesions, sore throat, vocal changes or dysphagia  Hematological and Lymphatic: negative for pallor or swollen lymph nodes  Respiratory: negative for cough, shortness of breath or wheezing  Cardiovascular: negative for chest pain, edema or murmur  Gastrointestinal: as mentioned in HPI  Genito-Urinary: negative for dysuria or incontinence  Musculoskeletal: negative for joint stiffness or joint swelling; +severe joint pain  Dermatological: negative for pruritus, rash, or jaundice    Current Medications  Current Outpatient Medications   Medication Sig Dispense Refill    aspirin (ECOTRIN LOW STRENGTH) 81 mg EC tablet Take 81 mg by mouth daily        cholecalciferol (VITAMIN D3) 1,000 units tablet Take 1 tablet (1,000 Units total) by mouth daily 30 tablet 0    digoxin (LANOXIN) 0 125 mg tablet Take 1 tablet (125 mcg total) by mouth daily 90 tablet 3    ELIQUIS 2 5 MG Take 2 5 mg by mouth 2 (two) times a day  2    escitalopram (LEXAPRO) 10 mg tablet Take 1 tablet (10 mg total) by mouth daily 30 tablet 0    finasteride (PROSCAR) 5 mg tablet Take 1 tablet (5 mg total) by mouth daily 90 tablet 2    lisinopril (ZESTRIL) 2 5 mg tablet Take 2 5 mg by mouth daily  3    loratadine (CLARITIN) 10 mg tablet Take by mouth daily as needed        metoprolol tartrate (LOPRESSOR) 100 mg tablet Take 1 tablet (100 mg total) by mouth every 12 (twelve) hours (Patient taking differently: Take 50 mg by mouth every 12 (twelve) hours  ) 60 tablet 0    pantoprazole (PROTONIX) 40 mg tablet TAKE 1 TABLET (40 MG TOTAL) BY MOUTH DAILY IN THE EARLY MORNING 90 tablet 0    polyethylene glycol (MIRALAX) 17 g packet Take 17 g by mouth daily 14 each 0    torsemide (DEMADEX) 10 mg tablet Take 1 tablet (10 mg total) by mouth daily 30 tablet 0    bisacodyl (DULCOLAX) 10 mg suppository Insert 1 suppository (10 mg total) into the rectum once for 1 dose 12 suppository 0    docusate sodium (COLACE) 100 mg capsule Take 1 capsule (100 mg total) by mouth 2 (two) times a day for 15 days 30 capsule 0    NON FORMULARY       oxybutynin (DITROPAN) 5 mg tablet       pantoprazole (PROTONIX) 40 mg tablet TAKE 1 TABLET (40 MG TOTAL) BY MOUTH DAILY IN THE EARLY MORNING  2     No current facility-administered medications for this visit          Past Medical History  Past Medical History:   Diagnosis Date    Anxiety     Arthritis     Atrial fibrillation (HCC)     Depression     GERD (gastroesophageal reflux disease)     Hypertension     Irregular heart beat     Macular degeneration        Past Surgical History  Past Surgical History:   Procedure Laterality Date    COLONOSCOPY      ESOPHAGOGASTRODUODENOSCOPY N/A 2/15/2018    Procedure: ESOPHAGOGASTRODUODENOSCOPY (EGD); Surgeon: Papi Lubin MD;  [facility];  Service: Gastroenterology    MULTIPLE TOOTH EXTRACTIONS      GA CYSTOURETHRO W/IMPLANT N/A 5/10/2019    Procedure: CYSTOSCOPY WITH INSERTION Sam Pier;  Surgeon: Sammi Motley MD;  [facility];  Service: Urology       Past Social History   Social History     Socioeconomic History    Marital status:       Spouse name: None    Number of children: None    Years of education: None    Highest education level: None   Occupational History    Occupation: RETIRED   Social Needs    Financial resource strain: None    Food insecurity:     Worry: None     Inability: None    Transportation needs:     Medical: None     Non-medical: None   Tobacco Use    Smoking status: Former Smoker     Last attempt to quit: 1984     Years since quittin 3    Smokeless tobacco: Never Used   Substance and Sexual Activity    Alcohol use: No    Drug use: No    Sexual activity: None   Lifestyle    Physical activity:     Days per week: None     Minutes per session: None    Stress: None   Relationships    Social connections:     Talks on phone: None     Gets together: None     Attends Yazidi service: None     Active member of club or organization: None     Attends meetings of clubs or organizations: None     Relationship status: None    Intimate partner violence:     Fear of current or ex partner: None     Emotionally abused: None     Physically abused: None     Forced sexual activity: None   Other Topics Concern    None   Social History Narrative    None       The following portions of the patient's history were reviewed and updated as appropriate: allergies, current medications, past family history, past medical history, past social history, past surgical history and problem list     Vital Signs  Vitals:    08/27/19 1312   BP: 120/70   BP Location: Left arm   Patient Position: Sitting   Cuff Size: Standard   Pulse: 64   Resp: 18   Temp: 97 9 °F (36 6 °C)   TempSrc: Tympanic   Weight: 104 kg (228 lb 6 4 oz)   Height: 5' 10 5" (1 791 m)       Physical Exam:  General appearance: alert, cooperative, no distress, pleasant elderly male  HEENT: normocephalic, anicteric, no eye erythema or discharge, no oropharyngeal thrush  Neck: supple, trachea midline, no adenopathy  Lungs: CTA b/l, no rales, rhonchi, or wheezing, unlabored respirations  Heart: RRR, no murmur, rubs, or gallops  Abdomen: soft, obese, non-tender, non-distended, normal bowel sounds, no masses or organomegaly  Rectal: deferred  Extremities: no cyanosis, clubbing, or edema  Musculoskeletal: slow gait, uses walker  Skin: color and texture normal, no jaundice, no rashes or lesions  Psychiatric: alert and oriented, normal affect and behavior

## 2019-10-28 DIAGNOSIS — I48.91 ATRIAL FIBRILLATION, UNSPECIFIED TYPE (HCC): ICD-10-CM

## 2019-10-28 RX ORDER — DIGOXIN 125 MCG
TABLET ORAL
Qty: 90 TABLET | Refills: 3 | Status: SHIPPED | OUTPATIENT
Start: 2019-10-28 | End: 2020-10-16

## 2019-11-17 DIAGNOSIS — R13.10 DYSPHAGIA, UNSPECIFIED TYPE: ICD-10-CM

## 2019-11-17 RX ORDER — PANTOPRAZOLE SODIUM 40 MG/1
40 TABLET, DELAYED RELEASE ORAL
Qty: 90 TABLET | Refills: 1 | Status: SHIPPED | OUTPATIENT
Start: 2019-11-17

## 2019-12-08 ENCOUNTER — HOSPITAL ENCOUNTER (EMERGENCY)
Facility: HOSPITAL | Age: 84
Discharge: HOME | End: 2019-12-08
Attending: EMERGENCY MEDICINE | Admitting: EMERGENCY MEDICINE
Payer: COMMERCIAL

## 2019-12-08 VITALS
BODY MASS INDEX: 32.1 KG/M2 | SYSTOLIC BLOOD PRESSURE: 180 MMHG | DIASTOLIC BLOOD PRESSURE: 81 MMHG | TEMPERATURE: 98.1 F | RESPIRATION RATE: 16 BRPM | HEART RATE: 59 BPM | HEIGHT: 71 IN | OXYGEN SATURATION: 94 % | WEIGHT: 229.28 LBS

## 2019-12-08 DIAGNOSIS — K59.00 CONSTIPATION, UNSPECIFIED CONSTIPATION TYPE: ICD-10-CM

## 2019-12-08 DIAGNOSIS — R33.9 URINARY RETENTION: Primary | ICD-10-CM

## 2019-12-08 LAB
BACTERIA UR QL AUTO: ABNORMAL /HPF
BILIRUB UR QL STRIP: NEGATIVE
CLARITY UR: CLEAR
COLOR UR: YELLOW
GLUCOSE UR STRIP-MCNC: NEGATIVE MG/DL
HGB UR QL STRIP.AUTO: ABNORMAL
KETONES UR STRIP-MCNC: NEGATIVE MG/DL
LEUKOCYTE ESTERASE UR QL STRIP: NEGATIVE
NITRITE UR QL STRIP: NEGATIVE
NON-SQ EPI CELLS URNS QL MICRO: ABNORMAL /HPF
PH UR STRIP.AUTO: 5.5 [PH] (ref 4.5–8)
PROT UR STRIP-MCNC: NEGATIVE MG/DL
RBC #/AREA URNS AUTO: ABNORMAL /HPF
SP GR UR STRIP.AUTO: 1.02 (ref 1–1.03)
UROBILINOGEN UR QL STRIP.AUTO: 0.2 E.U./DL
WBC #/AREA URNS AUTO: ABNORMAL /HPF

## 2019-12-08 PROCEDURE — 81001 URINALYSIS AUTO W/SCOPE: CPT

## 2019-12-08 PROCEDURE — 99283 EMERGENCY DEPT VISIT LOW MDM: CPT

## 2019-12-08 PROCEDURE — 99284 EMERGENCY DEPT VISIT MOD MDM: CPT | Performed by: EMERGENCY MEDICINE

## 2019-12-08 RX ORDER — LIDOCAINE HYDROCHLORIDE 20 MG/ML
1 JELLY TOPICAL ONCE
Status: COMPLETED | OUTPATIENT
Start: 2019-12-08 | End: 2019-12-08

## 2019-12-08 RX ORDER — CIPROFLOXACIN 500 MG/1
500 TABLET, FILM COATED ORAL EVERY 12 HOURS SCHEDULED
Qty: 14 TABLET | Refills: 0 | Status: SHIPPED | OUTPATIENT
Start: 2019-12-08 | End: 2019-12-15

## 2019-12-08 RX ORDER — POLYETHYLENE GLYCOL 3350 17 G/17G
17 POWDER, FOR SOLUTION ORAL DAILY
Qty: 14 EACH | Refills: 0 | Status: SHIPPED | OUTPATIENT
Start: 2019-12-08

## 2019-12-08 RX ADMIN — LIDOCAINE HYDROCHLORIDE 1 APPLICATION: 20 JELLY TOPICAL at 12:26

## 2019-12-08 NOTE — DISCHARGE INSTRUCTIONS
Bowel Regimen: Choose one method below to start, then add as needed:  MiraLAX: 3 times a day for 3 days, then once daily  Colace: 1 pill twice a day  Citrucel: As directed on the bottle  You should drink at least 1 5 L of water per day    If you have been taking narcotic pain medication you should add:  Senokot: 2 tablets at bedtime

## 2019-12-08 NOTE — ED PROVIDER NOTES
History  Chief Complaint   Patient presents with    Urinary Retention     Pt presents to ED from home after not being able to urinate since yesterday  Pt (+) hx urinary retention  80-year-old male presents today reporting urinary retention since yesterday  States he has a history of prostate problems  He does take Proscar  Son-in-law also reports he had a procedure to clear out his bladder  Last urinated yesterday  Difficulty Urinating   Presenting symptoms: dysuria    Ineffective treatments:  None tried  Associated symptoms: urinary frequency and urinary retention    Associated symptoms: no abdominal pain and no fever    Risk factors: no change in medication, no recent infection and no urinary catheter        Prior to Admission Medications   Prescriptions Last Dose Informant Patient Reported? Taking?    ELIQUIS 2 5 MG  Child Yes No   Sig: Take 2 5 mg by mouth 2 (two) times a day   NON FORMULARY  Child Yes No   aspirin (ECOTRIN LOW STRENGTH) 81 mg EC tablet  Child Yes No   Sig: Take 81 mg by mouth daily     bisacodyl (DULCOLAX) 10 mg suppository  Child No No   Sig: Insert 1 suppository (10 mg total) into the rectum once for 1 dose   cholecalciferol (VITAMIN D3) 1,000 units tablet  Child No No   Sig: Take 1 tablet (1,000 Units total) by mouth daily   digoxin (LANOXIN) 0 125 mg tablet   No No   Sig: TAKE 1 TABLET BY MOUTH EVERY DAY   docusate sodium (COLACE) 100 mg capsule   No No   Sig: Take 1 capsule (100 mg total) by mouth 2 (two) times a day for 15 days   escitalopram (LEXAPRO) 10 mg tablet  Child No No   Sig: Take 1 tablet (10 mg total) by mouth daily   finasteride (PROSCAR) 5 mg tablet  Child No No   Sig: Take 1 tablet (5 mg total) by mouth daily   lisinopril (ZESTRIL) 2 5 mg tablet  Child Yes No   Sig: Take 2 5 mg by mouth daily   loratadine (CLARITIN) 10 mg tablet  Child Yes No   Sig: Take by mouth daily as needed     metoprolol tartrate (LOPRESSOR) 100 mg tablet  Child No No   Sig: Take 1 tablet (100 mg total) by mouth every 12 (twelve) hours   Patient taking differently: Take 50 mg by mouth every 12 (twelve) hours     oxybutynin (DITROPAN) 5 mg tablet  Child Yes No   pantoprazole (PROTONIX) 40 mg tablet  Child Yes No   Sig: TAKE 1 TABLET (40 MG TOTAL) BY MOUTH DAILY IN THE EARLY MORNING   pantoprazole (PROTONIX) 40 mg tablet   No No   Sig: TAKE 1 TABLET (40 MG TOTAL) BY MOUTH DAILY IN THE EARLY MORNING   polyethylene glycol (MIRALAX) 17 g packet  Child No No   Sig: Take 17 g by mouth daily   torsemide (DEMADEX) 10 mg tablet  Child No No   Sig: Take 1 tablet (10 mg total) by mouth daily      Facility-Administered Medications: None       Past Medical History:   Diagnosis Date    Anxiety     Arthritis     Atrial fibrillation (HCC)     Depression     GERD (gastroesophageal reflux disease)     Hypertension     Irregular heart beat     Macular degeneration        Past Surgical History:   Procedure Laterality Date    COLONOSCOPY      ESOPHAGOGASTRODUODENOSCOPY N/A 2/15/2018    Procedure: ESOPHAGOGASTRODUODENOSCOPY (EGD); Surgeon: Rhonda Leigh MD;  [facility];  Service: Gastroenterology    MULTIPLE TOOTH EXTRACTIONS      PA CYSTOURETHRO W/IMPLANT N/A 5/10/2019    Procedure: Rhonda Varnerr;  Surgeon: Ankur Noble MD;  [facility];  Service: Urology       Family History   Problem Relation Age of Onset    Cancer Mother     Cancer Father     Heart failure Brother         jeanine     Heart attack Neg Hx     Stroke Neg Hx     Anuerysm Neg Hx     Clotting disorder Neg Hx     Arrhythmia Neg Hx     Sudden death Neg Hx         scd    Coronary artery disease Neg Hx     Hypertension Neg Hx     Hyperlipidemia Neg Hx      I have reviewed and agree with the history as documented      Social History     Tobacco Use    Smoking status: Former Smoker     Last attempt to quit: 1984     Years since quittin 6    Smokeless tobacco: Never Used Substance Use Topics    Alcohol use: No    Drug use: No        Review of Systems   Constitutional: Negative for chills, fatigue and fever  HENT: Negative for postnasal drip, sore throat and trouble swallowing  Respiratory: Negative for chest tightness and shortness of breath  Cardiovascular: Negative for leg swelling  Gastrointestinal: Negative for abdominal pain  Genitourinary: Positive for dysuria and frequency  Musculoskeletal: Negative for back pain  Skin: Negative for rash  Allergic/Immunologic: Negative for immunocompromised state  Neurological: Negative for dizziness, light-headedness and headaches  Psychiatric/Behavioral: Negative for confusion  Physical Exam  Physical Exam   Constitutional: He is oriented to person, place, and time  He appears well-developed and well-nourished  HENT:   Head: Normocephalic and atraumatic  Mouth/Throat: Uvula is midline, oropharynx is clear and moist and mucous membranes are normal  No tonsillar exudate  Eyes: Pupils are equal, round, and reactive to light  Neck: Normal range of motion  Neck supple  Cardiovascular: Normal rate and regular rhythm  Pulmonary/Chest: Effort normal and breath sounds normal    Abdominal: Soft  Bowel sounds are normal  There is tenderness (Distended urinary bladder appreciated below the umbilicus)  There is no rebound and no guarding  Musculoskeletal: He exhibits no edema, tenderness or deformity  Neurological: He is alert and oriented to person, place, and time  Patient moving all extremities equally, no focal neuro deficits noted  Skin: Skin is warm and dry  Capillary refill takes less than 2 seconds  Psychiatric: He has a normal mood and affect  Nursing note and vitals reviewed        Vital Signs  ED Triage Vitals [12/08/19 1116]   Temperature Pulse Respirations Blood Pressure SpO2   98 1 °F (36 7 °C) 59 16 (!) 180/81 94 %      Temp Source Heart Rate Source Patient Position - Orthostatic VS BP Location FiO2 (%)   Oral Monitor Sitting Left arm --      Pain Score       Worst Possible Pain           Vitals:    12/08/19 1116   BP: (!) 180/81   Pulse: 59   Patient Position - Orthostatic VS: Sitting         Visual Acuity      ED Medications  Medications   lidocaine (URO-JET) 2 % urethral/mucosal gel 1 application (1 application Urethral Given 12/8/19 1226)       Diagnostic Studies  Results Reviewed     Procedure Component Value Units Date/Time    Urine Microscopic [[id number]] Collected:  12/08/19 1231    Lab Status: In process Specimen:  Urine, Clean Catch Updated:  12/08/19 1234    Urine Macroscopic, POC [[id number]]  (Abnormal) Collected:  12/08/19 1231    Lab Status:  Final result Specimen:  Urine Updated:  12/08/19 1231     Color, UA Yellow     Clarity, UA Clear     pH, UA 5 5     Leukocytes, UA Negative     Nitrite, UA Negative     Protein, UA Negative mg/dl      Glucose, UA Negative mg/dl      Ketones, UA Negative mg/dl      Urobilinogen, UA 0 2 E U /dl      Bilirubin, UA Negative     Blood, UA Large     Specific [place], UA 1 020    Narrative:       CLINITEK RESULT                 No orders to display              Procedures  Procedures         ED Course                               MDM  Number of Diagnoses or Management Options  Constipation, unspecified constipation type: new and requires workup  Urinary retention: new and requires workup  Diagnosis management comments: Bladder scan shows over 500 cc, urinary catheter inserted  Patient feeling much better  12:48 PM  UA negative for UTI  Patient feeling much better after almost 2 L drained from the bladder  Will DC home to follow up with Urology as an outpatient  Patient now admits to being constipated x2 days  I explained that now with the fullness in his urinary bladder relieved he may find it easier to go  Will recommend MiraLax as needed         Amount and/or Complexity of Data Reviewed  Clinical lab tests: ordered and reviewed  Obtain history from someone other than the patient: yes    Risk of Complications, Morbidity, and/or Mortality  Presenting problems: high  Diagnostic procedures: high  Management options: moderate    Patient Progress  Patient progress: improved        Disposition  Final diagnoses:   Urinary retention   Constipation, unspecified constipation type     Time reflects when diagnosis was documented in both MDM as applicable and the Disposition within this note     Time User Action Codes Description Comment    12/8/2019 12:24 PM Ana Luisa Giraldo [R33 [id number]] Urinary retention     12/8/2019 12:35 PM Cesar Lane [[id number]] Constipation, unspecified constipation type       ED Disposition     ED Disposition Condition Date/Time Comment    Discharge Stable Sun Dec 8, 2019 12:24 PM Kim Danielle discharge to home/self care  Follow-up Information     Follow up With Specialties Details Why Contact Info Additional Information    Majo Isidro MD Urology Schedule an appointment as soon as possible for a visit  for coulter catheter removal [address]  [address] [facility] Emergency Department Emergency Medicine  If symptoms worsen [address] [address]  [phone]  ED,  [address] [place] [address], [phone]          Patient's Medications   Discharge Prescriptions    CIPROFLOXACIN (CIPRO) 500 MG TABLET    Take 1 tablet (500 mg total) by mouth every 12 (twelve) hours for 7 days       Start Date: 12/8/2019 End Date: 12/15/2019       Order Dose: 500 mg       Quantity: 14 tablet    Refills: 0    POLYETHYLENE GLYCOL (MIRALAX) 17 G PACKET    Take 17 g by mouth daily       Start Date: 12/8/2019 End Date: --       Order Dose: 17 g       Quantity: 14 each    Refills: 0     No discharge procedures on file      ED Provider  Electronically Signed by           Bharati Patterson DO  12/08/19 2968

## 2019-12-10 ENCOUNTER — PROCEDURE VISIT (OUTPATIENT)
Dept: UROLOGY | Facility: CLINIC | Age: 84
End: 2019-12-10
Payer: COMMERCIAL

## 2019-12-10 ENCOUNTER — TELEPHONE (OUTPATIENT)
Dept: UROLOGY | Facility: MEDICAL CENTER | Age: 84
End: 2019-12-10

## 2019-12-10 VITALS
DIASTOLIC BLOOD PRESSURE: 76 MMHG | BODY MASS INDEX: 34.07 KG/M2 | HEART RATE: 78 BPM | HEIGHT: 70 IN | WEIGHT: 238 LBS | SYSTOLIC BLOOD PRESSURE: 118 MMHG

## 2019-12-10 DIAGNOSIS — R33.9 URINARY RETENTION: ICD-10-CM

## 2019-12-10 PROCEDURE — 99211 OFF/OP EST MAY X REQ PHY/QHP: CPT | Performed by: UROLOGY

## 2019-12-10 NOTE — TELEPHONE ENCOUNTER
Patient is managed by Marii Paige at the [address] [facility] office  Patient is seen for BPH and is s/p uro lift on 5/10/19  Patient was seen in the ER last night for urinary retention and had coulter catheter placed  Called and spoke to Belia/daughter (emergency contact)  Leaking started sometime over night  Paul Goncalves reports that there is no urine coming out of catheter currently  Offered an appointment with nurse at [facility] or [facility] (which ever is closest for them) to be seen by nurse for coulter check  Paul Goncalves reports that they both offices are about the same distance  Patient scheduled with nurse at [facility] office for coulter check/change if needed at 10:00  What is the plan for patient  He currently does not have any follow up scheduled at this time

## 2019-12-10 NOTE — TELEPHONE ENCOUNTER
Patient of Dr Boris Prince seen in the [facility] office  Patient daughter Belinda Lauren called in and advised that patient went to the er and got a coulter put in  She advised that the patient stated that it feels like it is coming out and she advised that the urine is not draining in the bag and is coming out of the cathter sight  Patient daughter would like to know what she should do  Please advise

## 2019-12-10 NOTE — PROGRESS NOTES
12/10/2019    [facility]  1935  [id number]    Diagnosis  Chief Complaint     Benign Prostatic Hypertrophy; Urinary Retention          Patient presents for coulter catheter check managed by Dr Clair Urias  Follow up in 1 week for a void trail one of the appointments with RN and one appointment with an AP for follow up per Madhav De Anda PA-C    Procedure Coulter catheter check    Patient is managed by Neelima Tirado at the [address] [facility] office  Patient is seen for BPH and is s/p uro lift on 5/10/19  Patient was seen in the ER last night for urinary retention and had coulter catheter placed  Patient presents to the office for coulter catheter check  Patient had coulter catheter placed in the ER last night due to urinary retention  Was able to flush patients catheter easily and get catheter draining well  Irrigated catheter with 300ml of normal saline  No hematuria or sediment seen with irrigating  Coulter was attached to overnight bag per request  Urine seen draining into bag immediately once attached  Spoke with Madhav De Anda PA-C who recommends patient follow up next week for a void trial in the office  Erica Chambers recommends that one appointment be for RN and one appointment be with an AP for follow up and plan of care  Patient and daughter in law Jacinto Brenner agree to plan and was scheduled for next Friday 12/20/19 at 8:30 for coulter removal and 3:00 for PVR and see AP  Patient and daughter in law denies any questions or concerns at this time but are aware that they can call the office if they have any concerns         Vitals:    12/10/19 1029   BP: 118/76   BP Location: Right arm   Patient Position: Sitting   Cuff Size: Standard   Pulse: 78   Weight: 108 kg (238 lb)   Height: 5' 10" (1 778 m)           Domi La RN

## 2019-12-18 NOTE — PROGRESS NOTES
Assessment and plan:       1  Status post Urolift performed on 05/10/2019 by Dr Romulo Hines  - Patient was seen in the emergency department on 12/08/2019 and found to be in urinary retention  Pacheco catheter was placed at that time  - Catheter was removed this morning and patient's afternoon PVR was 246 mL  - We discussed the following his Urolift his PVR was 220 so although this is higher than we would prefer, he is nearly back at his baseline   - His constipation has resolved  - He will follow up in 1 month for symptom reassessment with PVR  Jerson Harris PA-C      Chief Complaint     Chief Complaint   Patient presents with    Benign Prostatic Hypertrophy    Urinary Frequency         History of Present Illness     Alysha Earl is a 80 y o  male patient known to Dr Romulo Hines for history of urinary retention  He underwent the Urolift procedure on 05/10/2019  Preoperatively the patient was catheter dependent  At his follow-up on 08/27/2019 he had an AUA symptom score of 0 with an overall bother score of 2  Unfortunately at that time the patient continued to have an elevated PVR of 220 mL  Patient was seen in the emergency department on 12/08/2019 for urinary retention and a catheter was placed  Prior to his emergency department visit he was constipated  He is now having more normal bowel movements with a combination of been a fiber and MiraLax  He continues to have an elevated PVR of 246 mL but has been urinating and this is near his postoperative PVR of 220 mL  Laboratory     Lab Results   Component Value Date    CREATININE 1 26 07/16/2019       No results found for: PSA    Recent Results (from the past 1 hour(s))   POCT Measure PVR    Collection Time: 12/20/19  3:04 PM   Result Value Ref Range    POST-VOID RESIDUAL VOLUME, ML  mL         Review of Systems     Review of Systems   Constitutional: Negative for chills and fever  HENT: Negative  Eyes: Negative  Respiratory: Negative for shortness of breath  Cardiovascular: Negative for chest pain  Gastrointestinal: Negative for constipation, diarrhea, nausea and vomiting  Genitourinary: Negative for difficulty urinating, dysuria, enuresis, flank pain, frequency, hematuria and urgency  Musculoskeletal: Negative  Allergies     No Known Allergies    Physical Exam     Physical Exam   Constitutional: He is oriented to person, place, and time  He appears well-developed and well-nourished  No distress  HENT:   Head: Normocephalic and atraumatic  Right Ear: External ear normal    Left Ear: External ear normal    Nose: Nose normal    Eyes: Right eye exhibits no discharge  Left eye exhibits no discharge  No scleral icterus  Cardiovascular: Normal rate  Pulmonary/Chest: Effort normal    Musculoskeletal:   Ambulates with the use of 2 canes for assistance   Neurological: He is alert and oriented to person, place, and time  Skin: Skin is warm and dry  He is not diaphoretic  Psychiatric: He has a normal mood and affect  His behavior is normal  Judgment and thought content normal    Nursing note and vitals reviewed          Vital Signs     Vitals:    12/20/19 0818   BP: 122/76   BP Location: Right arm   Patient Position: Sitting   Cuff Size: Standard   Pulse: 72   Weight: 108 kg (238 lb)   Height: 5' 10" (1 778 m)         Current Medications       Current Outpatient Medications:     aspirin (ECOTRIN LOW STRENGTH) 81 mg EC tablet, Take 81 mg by mouth daily  , Disp: , Rfl:     cholecalciferol (VITAMIN D3) 1,000 units tablet, Take 1 tablet (1,000 Units total) by mouth daily, Disp: 30 tablet, Rfl: 0    digoxin (LANOXIN) 0 125 mg tablet, TAKE 1 TABLET BY MOUTH EVERY DAY, Disp: 90 tablet, Rfl: 3    ELIQUIS 2 5 MG, Take 2 5 mg by mouth 2 (two) times a day, Disp: , Rfl: 2    escitalopram (LEXAPRO) 10 mg tablet, Take 1 tablet (10 mg total) by mouth daily, Disp: 30 tablet, Rfl: 0    finasteride (PROSCAR) 5 mg tablet, Take 1 tablet (5 mg total) by mouth daily, Disp: 90 tablet, Rfl: 2    lisinopril (ZESTRIL) 2 5 mg tablet, Take 2 5 mg by mouth daily, Disp: , Rfl: 3    loratadine (CLARITIN) 10 mg tablet, Take by mouth daily as needed  , Disp: , Rfl:     metoprolol tartrate (LOPRESSOR) 100 mg tablet, Take 1 tablet (100 mg total) by mouth every 12 (twelve) hours (Patient taking differently: Take 50 mg by mouth every 12 (twelve) hours  ), Disp: 60 tablet, Rfl: 0    NON FORMULARY, , Disp: , Rfl:     oxybutynin (DITROPAN) 5 mg tablet, , Disp: , Rfl:     pantoprazole (PROTONIX) 40 mg tablet, TAKE 1 TABLET (40 MG TOTAL) BY MOUTH DAILY IN THE EARLY MORNING, Disp: , Rfl: 2    pantoprazole (PROTONIX) 40 mg tablet, TAKE 1 TABLET (40 MG TOTAL) BY MOUTH DAILY IN THE EARLY MORNING, Disp: 90 tablet, Rfl: 1    polyethylene glycol (MIRALAX) 17 g packet, Take 17 g by mouth daily, Disp: 14 each, Rfl: 0    polyethylene glycol (MIRALAX) 17 g packet, Take 17 g by mouth daily, Disp: 14 each, Rfl: 0    torsemide (DEMADEX) 10 mg tablet, Take 1 tablet (10 mg total) by mouth daily, Disp: 30 tablet, Rfl: 0    bisacodyl (DULCOLAX) 10 mg suppository, Insert 1 suppository (10 mg total) into the rectum once for 1 dose, Disp: 12 suppository, Rfl: 0    docusate sodium (COLACE) 100 mg capsule, Take 1 capsule (100 mg total) by mouth 2 (two) times a day for 15 days, Disp: 30 capsule, Rfl: 0      Active Problems     Patient Active Problem List   Diagnosis    Acute kidney injury superimposed on chronic kidney disease (HCC)    Atrial fibrillation (HCC)    Chronic systolic CHF (congestive heart failure) (HCC)    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    Depression    Hyponatremia    Azotemia    Arthritis    Legally blind    Peripheral neuropathy    NICM (nonischemic cardiomyopathy) (HCC)    Other proteinuria    Essential hypertension    Gross hematuria    Urinary retention    Constipation    Benign prostatic hyperplasia with lower urinary tract symptoms    CKD (chronic kidney disease) stage 3, GFR 30-59 ml/min ([facility])         Past Medical History     Past Medical History:   Diagnosis Date    Anxiety     Arthritis     Atrial fibrillation ([address] )     Depression     GERD (gastroesophageal reflux disease)     Hypertension     Irregular heart beat     Macular degeneration          Surgical History     Past Surgical History:   Procedure Laterality Date    COLONOSCOPY      ESOPHAGOGASTRODUODENOSCOPY N/A 2/15/2018    Procedure: ESOPHAGOGASTRODUODENOSCOPY (EGD);   Surgeon: Carlie Castillo MD;  [facility];  Service: Gastroenterology    MULTIPLE TOOTH EXTRACTIONS      IA CYSTOURETHRO W/IMPLANT N/A 5/10/2019    Procedure: Chikis Lozano;  Surgeon: Mally Castellano MD;  [facility];  Service: Urology         Family History     Family History   Problem Relation Age of Onset    Cancer Mother     Cancer Father     Heart failure Brother         jeanine     Heart attack Neg Hx     Stroke Neg Hx     Anuerysm Neg Hx     Clotting disorder Neg Hx     Arrhythmia Neg Hx     Sudden death Neg Hx         scd    Coronary artery disease Neg Hx     Hypertension Neg Hx     Hyperlipidemia Neg Hx          Social History     Social History       Radiology

## 2019-12-20 ENCOUNTER — OFFICE VISIT (OUTPATIENT)
Dept: UROLOGY | Facility: CLINIC | Age: 84
End: 2019-12-20
Payer: COMMERCIAL

## 2019-12-20 VITALS
HEIGHT: 70 IN | SYSTOLIC BLOOD PRESSURE: 122 MMHG | WEIGHT: 238 LBS | BODY MASS INDEX: 34.07 KG/M2 | HEART RATE: 72 BPM | DIASTOLIC BLOOD PRESSURE: 76 MMHG

## 2019-12-20 DIAGNOSIS — R33.9 URINARY RETENTION: Primary | ICD-10-CM

## 2019-12-20 LAB — POST-VOID RESIDUAL VOLUME, ML POC: 246 ML

## 2019-12-20 PROCEDURE — 99213 OFFICE O/P EST LOW 20 MIN: CPT | Performed by: PHYSICIAN ASSISTANT

## 2019-12-20 PROCEDURE — 51798 US URINE CAPACITY MEASURE: CPT | Performed by: PHYSICIAN ASSISTANT

## 2020-01-15 NOTE — PROGRESS NOTES
Assessment and plan: 1  Status post Urolift performed on 05/10/2019 by Dr Roddy Welch  - He is emptying excellently today with a PVR of 42 mL  - He does continue to have some urgency which we discussed will be the last symptom to improve and may not completely resolve due to how long he was suffering from prostatic obstruction   - He will return approximately 4 months, at 1 year status post Urolift, and if he continues to do well at that time, annual follow-up can be considered  Vitaliy Albert PA-C      Chief Complaint     Chief Complaint   Patient presents with    Urinary Retention         History of Present Illness     Braulio Bunn is a 80 y o  male patient known to Dr Roddy Welch for history of urinary retention  He underwent a Urolift procedure on 05/10/2019  Preoperatively the patient was catheter dependent  At his follow-up in August he had an AUA symptom score of 0 with an overall bother score of 2 but continues to have an elevated PVR of 220 mL  Unfortunately, on 12/08/2019 he was seen in the emergency department for urinary retention and catheter had to be replaced  He did report that prior to his emergency department visit he was constipated and at his follow-up was having more normal bowel movements with a combination of Benafiber and MiraLax  He continued to have a PVR of 246 mL which is elevated but near his postoperative PVR of 220 mL  He is emptying excellently today with a PVR of 42 mL  He does continue to have some urgency which we can discussed will be the last of his symptoms to improve following the Urolift  He has no other health complaints today        Laboratory     Lab Results   Component Value Date    CREATININE 1 26 07/16/2019       No results found for: PSA    Recent Results (from the past 1 hour(s))   POCT Measure PVR    Collection Time: 01/17/20  2:57 PM   Result Value Ref Range    POST-VOID RESIDUAL VOLUME, ML POC 42 mL         Review of Systems Review of Systems   Constitutional: Negative for chills and fever  HENT: Negative  Eyes: Negative  Respiratory: Negative for shortness of breath  Cardiovascular: Negative for chest pain  Gastrointestinal: Negative for constipation, diarrhea, nausea and vomiting  Genitourinary: Positive for urgency  Negative for difficulty urinating, enuresis, flank pain, frequency and hematuria  Musculoskeletal: Negative  Allergies     No Known Allergies    Physical Exam     Physical Exam   Constitutional: He is oriented to person, place, and time  He appears well-developed and well-nourished  No distress  Appears slightly sad today   HENT:   Head: Normocephalic and atraumatic  Right Ear: External ear normal    Left Ear: External ear normal    Nose: Nose normal    Eyes: Right eye exhibits no discharge  Left eye exhibits no discharge  No scleral icterus  Cardiovascular: Normal rate  Pulmonary/Chest: Effort normal    Musculoskeletal:   Ambulates with assistance of 2 canes   Neurological: He is alert and oriented to person, place, and time  Skin: Skin is warm and dry  He is not diaphoretic  Psychiatric: He has a normal mood and affect  His behavior is normal  Judgment and thought content normal    Nursing note and vitals reviewed  Vital Signs     There were no vitals filed for this visit        Current Medications       Current Outpatient Medications:     aspirin (ECOTRIN LOW STRENGTH) 81 mg EC tablet, Take 81 mg by mouth daily  , Disp: , Rfl:     cholecalciferol (VITAMIN D3) 1,000 units tablet, Take 1 tablet (1,000 Units total) by mouth daily, Disp: 30 tablet, Rfl: 0    digoxin (LANOXIN) 0 125 mg tablet, TAKE 1 TABLET BY MOUTH EVERY DAY, Disp: 90 tablet, Rfl: 3    ELIQUIS 2 5 MG, Take 2 5 mg by mouth 2 (two) times a day, Disp: , Rfl: 2    escitalopram (LEXAPRO) 10 mg tablet, Take 1 tablet (10 mg total) by mouth daily, Disp: 30 tablet, Rfl: 0    finasteride (PROSCAR) 5 mg tablet, Take 1 tablet (5 mg total) by mouth daily, Disp: 90 tablet, Rfl: 2    lisinopril (ZESTRIL) 2 5 mg tablet, Take 2 5 mg by mouth daily, Disp: , Rfl: 3    loratadine (CLARITIN) 10 mg tablet, Take by mouth daily as needed  , Disp: , Rfl:     metoprolol tartrate (LOPRESSOR) 100 mg tablet, Take 1 tablet (100 mg total) by mouth every 12 (twelve) hours (Patient taking differently: Take 50 mg by mouth every 12 (twelve) hours  ), Disp: 60 tablet, Rfl: 0    NON FORMULARY, , Disp: , Rfl:     oxybutynin (DITROPAN) 5 mg tablet, , Disp: , Rfl:     pantoprazole (PROTONIX) 40 mg tablet, TAKE 1 TABLET (40 MG TOTAL) BY MOUTH DAILY IN THE EARLY MORNING, Disp: , Rfl: 2    pantoprazole (PROTONIX) 40 mg tablet, TAKE 1 TABLET (40 MG TOTAL) BY MOUTH DAILY IN THE EARLY MORNING, Disp: 90 tablet, Rfl: 1    polyethylene glycol (MIRALAX) 17 g packet, Take 17 g by mouth daily, Disp: 14 each, Rfl: 0    torsemide (DEMADEX) 10 mg tablet, Take 1 tablet (10 mg total) by mouth daily, Disp: 30 tablet, Rfl: 0    bisacodyl (DULCOLAX) 10 mg suppository, Insert 1 suppository (10 mg total) into the rectum once for 1 dose, Disp: 12 suppository, Rfl: 0    docusate sodium (COLACE) 100 mg capsule, Take 1 capsule (100 mg total) by mouth 2 (two) times a day for 15 days, Disp: 30 capsule, Rfl: 0    polyethylene glycol (MIRALAX) 17 g packet, Take 17 g by mouth daily (Patient not taking: Reported on 1/17/2020), Disp: 14 each, Rfl: 0      Active Problems     Patient Active Problem List   Diagnosis    Acute kidney injury superimposed on chronic kidney disease ([facility] [address] )    Atrial fibrillation (HCC)    Chronic systolic CHF (congestive heart failure) (HCC)    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    Depression    Hyponatremia    Azotemia    Arthritis    Legally blind    Peripheral neuropathy    NICM (nonischemic cardiomyopathy) (HCC)    Other proteinuria    Essential hypertension    Gross hematuria    Urinary retention    Constipation    Benign prostatic hyperplasia with lower urinary tract symptoms    CKD (chronic kidney disease) stage 3, GFR 30-59 ml/min ([facility])         Past Medical History     Past Medical History:   Diagnosis Date    Anxiety     Arthritis     Atrial fibrillation ([address] )     Depression     GERD (gastroesophageal reflux disease)     Hypertension     Irregular heart beat     Macular degeneration          Surgical History     Past Surgical History:   Procedure Laterality Date    COLONOSCOPY      ESOPHAGOGASTRODUODENOSCOPY N/A 2/15/2018    Procedure: ESOPHAGOGASTRODUODENOSCOPY (EGD);   Surgeon: Shiloh Bacon MD;  Location: [facility];  Service: Gastroenterology    MULTIPLE TOOTH EXTRACTIONS      OR CYSTOURETHRO W/IMPLANT N/A 5/10/2019    Procedure: Sylvester Medel;  Surgeon: Janet Cullen MD;  [facility];  Service: Urology         Family History     Family History   Problem Relation Age of Onset    Cancer Mother     Cancer Father     Heart failure Brother         jeanine     Heart attack Neg Hx     Stroke Neg Hx     Anuerysm Neg Hx     Clotting disorder Neg Hx     Arrhythmia Neg Hx     Sudden death Neg Hx         scd    Coronary artery disease Neg Hx     Hypertension Neg Hx     Hyperlipidemia Neg Hx          Social History     Social History       Radiology

## 2020-01-17 ENCOUNTER — OFFICE VISIT (OUTPATIENT)
Dept: UROLOGY | Facility: CLINIC | Age: 85
End: 2020-01-17
Payer: COMMERCIAL

## 2020-01-17 DIAGNOSIS — R33.9 URINARY RETENTION: Primary | ICD-10-CM

## 2020-01-17 LAB — POST-VOID RESIDUAL VOLUME, ML POC: 42 ML

## 2020-01-17 PROCEDURE — 99213 OFFICE O/P EST LOW 20 MIN: CPT | Performed by: PHYSICIAN ASSISTANT

## 2020-01-17 PROCEDURE — 51798 US URINE CAPACITY MEASURE: CPT | Performed by: PHYSICIAN ASSISTANT

## 2020-02-03 ENCOUNTER — TRANSCRIBE ORDERS (OUTPATIENT)
Dept: PHYSICAL THERAPY | Facility: CLINIC | Age: 85
End: 2020-02-03

## 2020-02-03 ENCOUNTER — EVALUATION (OUTPATIENT)
Dept: PHYSICAL THERAPY | Facility: CLINIC | Age: 85
End: 2020-02-03
Payer: COMMERCIAL

## 2020-02-03 DIAGNOSIS — R53.1 WEAKNESS GENERALIZED: Primary | ICD-10-CM

## 2020-02-03 PROCEDURE — 97110 THERAPEUTIC EXERCISES: CPT | Performed by: PHYSICAL THERAPIST

## 2020-02-03 PROCEDURE — 97162 PT EVAL MOD COMPLEX 30 MIN: CPT | Performed by: PHYSICAL THERAPIST

## 2020-02-03 NOTE — LETTER
February 3, 2020    [address] [address]    Patient: Rhianna Aaron   YOB: 1935   Date of Visit: 2/3/2020     Encounter Diagnosis     ICD-10-CM    1  Weakness generalized R53 1        Dear Dr Alexy Appiah:    Thank you for your recent referral of Rhianna Aaron  Please review the attached evaluation summary from Matthias's recent visit  Please verify that you agree with the plan of care by signing the attached order  If you have any questions or concerns, please do not hesitate to call  I sincerely appreciate the opportunity to share in the care of one of your patients and hope to have another opportunity to work with you in the near future  Sincerely,    Isabella Adan, PT      Referring Provider:      I certify that I have read the below Plan of Care and certify the need for these services furnished under this plan of treatment while under my care  [address] [address]  VIA Facsimile: [phone]          PT Evaluation     Today's date: 2/3/2020  Patient name: Rhianna Aaron  : 1935  MRN: [id number]  Referring provider: Sky Vazquez DO  Dx:   Encounter Diagnosis     ICD-10-CM    1  Weakness generalized R53 1                   Assessment  Assessment details: Rhianna Aaron is a pleasant 80 y o  presenting to physical therapy with MD referral for weakness (upper and lower extremity)  Problem list:  Limited shoulder AROM, decreased hip/core strength, limited lower extremity flexibility, poor balance, abnormal gait, and decreased functional upper extremity strength  Treatment to include: Manual therapy techniques, lower extremity/core strengthening, upper extremity/periscapular strengthening, neuromuscular control exercises, balance/proprioception training, gait training as needed, instruction in a comprehensive HEP, and modalities as needed       This pt would benefit from skilled PT services to address their impairments and functional limitations to maximize functional outcome  Symptom irritability: moderateBarriers to therapy: Legally blind (macular degeneration), depression, anxiety, CHF, afib, peripheral neuropathy  Understanding of Dx/Px/POC: good   Prognosis: fair    Goals  ST  Pt will be able to perform sit to stand transfer with use of BUEs in no more than 2 attempts in 3 weeks  2  Pt will improve biceps strength to at least 4+/5 in 3 weeks  LT  Pt will be able to negotiate step in/out of his home with no more than mild difficulty in 6 weeks  2  Pt will be independent in a comprehensive HEP in 6 weeks  Plan  Patient would benefit from: skilled physical therapy  Frequency: 2x week  Duration in weeks: 6  Treatment plan discussed with: patient        Subjective Evaluation    History of Present Illness  Mechanism of injury: Pt reports he feels his weakness comes and goes when the weather comes and goes  Pt reports he has experienced two falls within the past year without injury (misstep into a divot in the driveway and fall off chair lift recliner when leaning forward)  Pt feels as though his vision is his largest problem at this time  Pt also reports tenderness in his feet which he feels limits his ability to walk for prolonged times  Pt reports his main goal with therapy is to improve his upper and lower extremity strength as well as balance      Current status:  - ADLs/Functional activities:   - One step to enter down into home with one step up to get into home with use of B canes with moderate difficulty   - Sit to stand with severe difficulty   - Walking < 5 minutes with B SC or RW prior to feelings of SOB and B leg fatigue   - Standing to cook dinner with moderate fatigue  - Work: Not a working individual  - Recreation: none  Pain  No pain reported  Progression: worsening    Social Support  Steps to enter house: yes  Lives with: adult children    Treatments  Treatments tried: none  Current treatment: physical therapy  Patient Goals  Patient goals for therapy: improved balance, increased strength and independence with ADLs/IADLs          Objective     Active Range of Motion     Additional Active Range of Motion Details  Upper Quarter screen: B shoulder abduction to approximately 70 degrees, B flexion approximately 120 degrees      Strength/Myotome Testing     Left Shoulder     Planes of Motion   Flexion: 4-   Abduction: 4-     Isolated Muscles   Biceps: 4   Triceps: 4     Right Shoulder     Planes of Motion   Flexion: 4-   Abduction: 4-     Isolated Muscles   Biceps: 4+   Triceps: 4+     Left Hip   Planes of Motion   Flexion: 4+  External rotation: 4    Right Hip   Planes of Motion   Flexion: 4+  External rotation: 4    Left Knee   Flexion: 4+  Extension: 4+    Right Knee   Flexion: 4-  Extension: 4+    Left Ankle/Foot   Dorsiflexion: 4+  Plantar flexion: 4+    Right Ankle/Foot   Dorsiflexion: 4+  Plantar flexion: 4+    Additional Strength Details  SLS L: 2 seconds  SLS R: unable    Feet together, eyes open, firm surface 30 seconds mild sway  Feet together, eyes closed, firm surface: 30 seconds moderate sway    Sit to stand: multiple attempts with use of BUE support       Flowsheet Rows      Most Recent Value   PT/OT G-Codes   Current Score  28   Projected Score  49   Assessment Type  Evaluation             Precautions: Legally blind (macular degeneration), depression, anxiety, CHF (monitor SPO2), afib, peripheral neuropathy          Exercise Diary  2-3 (IE)            NuStep (arms and legs) Monitor SPO2 6 mins, L3 NV                         Standing:             - front step up 10 x 4" step NV            - lateral step up and over 10 x 4" step NV            - sit to stand (to adjustable mat to level he can transfer without use of arms, gradually lower table) 10 x NV            - heel/toe raises 2 x 10 ea NV                         TB: - rows 2 x 10 RTB NV            - pulldowns 2 x 10 RTB NV            - bicep curls 2 x 10 GTB NV            - tricep extensions 2 x 10 RTB NV                         - Lateral cone walks             - FT, EO, foam             - SLS (initially 2 finger support)                                                    * On initial evaluation, educated pt on anatomy, pathology, and exercise rationale  Educated pt to continue walking with an assistive device due to poor functional balance  Educated pt to call with any questions or concerns

## 2020-02-03 NOTE — PROGRESS NOTES
PT Evaluation     Today's date: 2/3/2020  Patient name: Shakir Bernal  : 1935  MRN: [id number]  Referring provider: Winnie Caba DO  Dx:   Encounter Diagnosis     ICD-10-CM    1  Weakness generalized R53 1                   Assessment  Assessment details: Shakir Bernal is a pleasant 80 y o  presenting to physical therapy with MD referral for weakness (upper and lower extremity)  Problem list:  Limited shoulder AROM, decreased hip/core strength, limited lower extremity flexibility, poor balance, abnormal gait, and decreased functional upper extremity strength  Treatment to include: Manual therapy techniques, lower extremity/core strengthening, upper extremity/periscapular strengthening, neuromuscular control exercises, balance/proprioception training, gait training as needed, instruction in a comprehensive HEP, and modalities as needed  This pt would benefit from skilled PT services to address their impairments and functional limitations to maximize functional outcome  Symptom irritability: moderateBarriers to therapy: Legally blind (macular degeneration), depression, anxiety, CHF, afib, peripheral neuropathy  Understanding of Dx/Px/POC: good   Prognosis: fair    Goals  ST  Pt will be able to perform sit to stand transfer with use of BUEs in no more than 2 attempts in 3 weeks  2  Pt will improve biceps strength to at least 4+/5 in 3 weeks  LT  Pt will be able to negotiate step in/out of his home with no more than mild difficulty in 6 weeks  2  Pt will be independent in a comprehensive HEP in 6 weeks  Plan  Patient would benefit from: skilled physical therapy  Frequency: 2x week  Duration in weeks: 6  Treatment plan discussed with: patient        Subjective Evaluation    History of Present Illness  Mechanism of injury: Pt reports he feels his weakness comes and goes when the weather comes and goes   Pt reports he has experienced two falls within the past year without injury (misstep into a divot in the driveway and fall off chair lift recliner when leaning forward)  Pt feels as though his vision is his largest problem at this time  Pt also reports tenderness in his feet which he feels limits his ability to walk for prolonged times  Pt reports his main goal with therapy is to improve his upper and lower extremity strength as well as balance  Current status:  - ADLs/Functional activities:   - One step to enter down into home with one step up to get into home with use of B canes with moderate difficulty   - Sit to stand with severe difficulty   - Walking < 5 minutes with B SC or RW prior to feelings of SOB and B leg fatigue   - Standing to cook dinner with moderate fatigue  - Work: Not a working individual  - Recreation: none  Pain  No pain reported  Progression: worsening    Social Support  Steps to enter house: yes  Lives with: adult children    Treatments  Treatments tried: none  Current treatment: physical therapy  Patient Goals  Patient goals for therapy: improved balance, increased strength and independence with ADLs/IADLs          Objective     Active Range of Motion     Additional Active Range of Motion Details  Upper Quarter screen: B shoulder abduction to approximately 70 degrees, B flexion approximately 120 degrees      Strength/Myotome Testing     Left Shoulder     Planes of Motion   Flexion: 4-   Abduction: 4-     Isolated Muscles   Biceps: 4   Triceps: 4     Right Shoulder     Planes of Motion   Flexion: 4-   Abduction: 4-     Isolated Muscles   Biceps: 4+   Triceps: 4+     Left Hip   Planes of Motion   Flexion: 4+  External rotation: 4    Right Hip   Planes of Motion   Flexion: 4+  External rotation: 4    Left Knee   Flexion: 4+  Extension: 4+    Right Knee   Flexion: 4-  Extension: 4+    Left Ankle/Foot   Dorsiflexion: 4+  Plantar flexion: 4+    Right Ankle/Foot   Dorsiflexion: 4+  Plantar flexion: 4+    Additional Strength Details  SLS L: 2 seconds  SLS R: unable    Feet together, eyes open, firm surface 30 seconds mild sway  Feet together, eyes closed, firm surface: 30 seconds moderate sway    Sit to stand: multiple attempts with use of BUE support       Flowsheet Rows      Most Recent Value   PT/OT G-Codes   Current Score  28   Projected Score  49   Assessment Type  Evaluation             Precautions: Legally blind (macular degeneration), depression, anxiety, CHF (monitor SPO2), afib, peripheral neuropathy          Exercise Diary  2-3 (IE)            NuStep (arms and legs) Monitor SPO2 6 mins, L3 NV                         Standing:             - front step up 10 x 4" step NV            - lateral step up and over 10 x 4" step NV            - sit to stand (to adjustable mat to level he can transfer without use of arms, gradually lower table) 10 x NV            - heel/toe raises 2 x 10 ea NV                         TB:             - rows 2 x 10 RTB NV            - pulldowns 2 x 10 RTB NV            - bicep curls 2 x 10 GTB NV            - tricep extensions 2 x 10 RTB NV                         - Lateral cone walks             - FT, EO, foam             - SLS (initially 2 finger support)                                                    * On initial evaluation, educated pt on anatomy, pathology, and exercise rationale  Educated pt to continue walking with an assistive device due to poor functional balance  Educated pt to call with any questions or concerns

## 2020-02-07 ENCOUNTER — APPOINTMENT (OUTPATIENT)
Dept: PHYSICAL THERAPY | Facility: CLINIC | Age: 85
End: 2020-02-07
Payer: COMMERCIAL

## 2020-02-11 ENCOUNTER — OFFICE VISIT (OUTPATIENT)
Dept: PHYSICAL THERAPY | Facility: CLINIC | Age: 85
End: 2020-02-11
Payer: COMMERCIAL

## 2020-02-11 DIAGNOSIS — R53.1 WEAKNESS GENERALIZED: Primary | ICD-10-CM

## 2020-02-11 PROCEDURE — 97530 THERAPEUTIC ACTIVITIES: CPT | Performed by: PHYSICAL THERAPIST

## 2020-02-11 PROCEDURE — 97110 THERAPEUTIC EXERCISES: CPT | Performed by: PHYSICAL THERAPIST

## 2020-02-11 NOTE — PROGRESS NOTES
Daily Note     Today's date: 2020  Patient name: Godfrey German  : 1935  MRN: [id number]  Referring provider: Megan Groves DO  Dx:   Encounter Diagnosis     ICD-10-CM    1  Weakness generalized R53 1                   Subjective: Patient reports no new complaints since time of initial evaluation  Objective: See treatment diary below      Assessment: Initiated exercises this date to address impairments noted during initial evaluation  Pt required seated rest breaks secondary to fatigue  Close supervision throughout all exercises secondary to pt being legally blind and poor balance  Pt required moderate verbal cues for proper sequencing with step up exercises  Sp02 was monitored throughout session and remained above 90%  Tolerated treatment well  Patient demonstrated fatigue post treatment, exhibited good technique with therapeutic exercises and would benefit from continued PT      Plan: Progress treatment as tolerated         Precautions: Legally blind (macular degeneration), depression, anxiety, CHF (monitor SPO2), afib, peripheral neuropathy          Exercise Diary  2-3 (IE) 2-11           NuStep (arms and legs) Monitor SPO2 6 mins, L3 NV Sp02 at rest 93%    6 mins, L3                        Standing:             - front step up 10 x 4" step NV 2 x 10 ea 4" step           - lateral step up and over 10 x 4" step NV 10 x 4" step           - sit to stand (to adjustable mat to level he can transfer without use of arms, gradually lower table) 10 x NV 2 x 10           - heel/toe raises 2 x 10 ea NV 2 x 10 ea           - hip abd  10 x ea           - hip ext  10 x ea                        TB:             - rows 2 x 10 RTB NV 2 x 10 RTB           - pulldowns 2 x 10 RTB NV 2 x 10 RTB           - bicep curls 2 x 10 GTB NV 2  10 GTB           - tricep extensions 2 x 10 RTB NV 2 x 10 RTB                        - Lateral cone walks             - FT, EO, foam             - SLS (initially 2 finger support)

## 2020-02-13 ENCOUNTER — OFFICE VISIT (OUTPATIENT)
Dept: PHYSICAL THERAPY | Facility: CLINIC | Age: 85
End: 2020-02-13
Payer: COMMERCIAL

## 2020-02-13 DIAGNOSIS — R53.1 WEAKNESS GENERALIZED: Primary | ICD-10-CM

## 2020-02-13 PROCEDURE — 97110 THERAPEUTIC EXERCISES: CPT

## 2020-02-13 PROCEDURE — 97530 THERAPEUTIC ACTIVITIES: CPT

## 2020-02-13 NOTE — PROGRESS NOTES
Daily Note     Today's date: 2020  Patient name: Melissa Franks  : 1935  MRN: [id number]  Referring provider: Dona Mtz DO  Dx: No diagnosis found  Subjective: ***      Objective: See treatment diary below      Assessment: Tolerated treatment {Tolerated treatment :}   Patient {assessment:[id number]}      Plan: {PLAN:}     Precautions: Legally blind (macular degeneration), depression, anxiety, CHF (monitor SPO2), afib, peripheral neuropathy          Exercise Diary  2-3 (IE) 2-11           NuStep (arms and legs) Monitor SPO2 6 mins, L3 NV Sp02 at rest 93%    6 mins, L3                        Standing:             - front step up 10 x 4" step NV 2 x 10 ea 4" step           - lateral step up and over 10 x 4" step NV 10 x 4" step           - sit to stand (to adjustable mat to level he can transfer without use of arms, gradually lower table) 10 x NV 2 x 10           - heel/toe raises 2 x 10 ea NV 2 x 10 ea           - hip abd  10 x ea           - hip ext  10 x ea                        TB:             - rows 2 x 10 RTB NV 2 x 10 RTB           - pulldowns 2 x 10 RTB NV 2 x 10 RTB           - bicep curls 2 x 10 GTB NV 2  10 GTB           - tricep extensions 2 x 10 RTB NV 2 x 10 RTB                        - Lateral cone walks             - FT, EO, foam             - SLS (initially 2 finger support)

## 2020-02-17 ENCOUNTER — OFFICE VISIT (OUTPATIENT)
Dept: PHYSICAL THERAPY | Facility: CLINIC | Age: 85
End: 2020-02-17
Payer: COMMERCIAL

## 2020-02-17 DIAGNOSIS — R53.1 WEAKNESS GENERALIZED: Primary | ICD-10-CM

## 2020-02-17 PROCEDURE — 97110 THERAPEUTIC EXERCISES: CPT

## 2020-02-17 NOTE — PROGRESS NOTES
Daily Note     Today's date: 2020  Patient name: Hood Phoenix  : 1935  MRN: [id number]  Referring provider: Elinor Beatty DO  Dx:   Encounter Diagnosis     ICD-10-CM    1  Weakness generalized R53 1        Start Time: 1145  Stop Time: 1233  Total time in clinic (min): 48 minutes    Subjective: Pt reported he had no increase in soreness post treatment session  Pt reported no recent falls at home  Objective: See treatment diary below      Assessment:  Continued with treatment session, VC required due to patient cannot see wall and required increased guidance on directions  Progressed patient number of reps completed no increase in pain noted/ some fatigue  Tolerated treatment well  Patient demonstrated fatigue post treatment, exhibited good technique with therapeutic exercises and would benefit from continued PT  Plan: Continue per plan of care  Precautions: Legally blind (macular degeneration), depression, anxiety, CHF (monitor SPO2), afib, peripheral neuropathy          Exercise Diary  2-3 (IE) 2-         NuStep (arms and legs) Monitor SPO2 6 mins, L3 NV Sp02 at rest 93%    6 mins, L3 SP02 at 93% L5 8 min  Spo2 at 92% L5 10 min  With UE                       Standing:             - front step up 10 x 4" step NV 2 x 10 ea 4" step 2x 10 4"  20x 4"          - lateral step up and over 10 x 4" step NV 10 x 4" step 2x 10 4"  20x 4" ea  - sit to stand (to adjustable mat to level he can transfer without use of arms, gradually lower table) 10 x NV 2 x 10  Table in use used UE and chair  2x 10          - heel/toe raises 2 x 10 ea NV 2 x 10 ea 2x 10  3x 10          - hip abd  10 x ea 10x  20x ea          - hip ext  10 x ea 10x  20x ea            Marches              HS curls    2x 10  20x          TB:             - rows 2 x 10 RTB NV 2 x 10 RTB  20x RTB seated          - pulldowns 2 x 10 RTB NV 2 x 10 RTB  20x RTB seated          - bicep curls 2 x 10 GTB NV 2  10 GTB  np - tricep extensions 2 x 10 RTB NV 2 x 10 RTB  np                       - Lateral cone walks    NV         - FT, EO, foam    NV         - SLS (initially 2 finger support)    NV         Bicep curls    20x 2#  20x 2# seated

## 2020-02-20 ENCOUNTER — OFFICE VISIT (OUTPATIENT)
Dept: PHYSICAL THERAPY | Facility: CLINIC | Age: 85
End: 2020-02-20
Payer: COMMERCIAL

## 2020-02-20 DIAGNOSIS — R53.1 WEAKNESS GENERALIZED: Primary | ICD-10-CM

## 2020-02-20 PROCEDURE — 97110 THERAPEUTIC EXERCISES: CPT | Performed by: PHYSICAL THERAPIST

## 2020-02-20 PROCEDURE — 97530 THERAPEUTIC ACTIVITIES: CPT | Performed by: PHYSICAL THERAPIST

## 2020-02-20 NOTE — PROGRESS NOTES
Daily Note     Today's date: 2020  Patient name: Deborah Cam  : 1935  MRN: [id number]  Referring provider: Darlene Franks,   Dx:   Encounter Diagnosis     ICD-10-CM    1  Weakness generalized R53 1                   Subjective: Patient denies any increase in pain/discomfort following previous session  Objective: See treatment diary below      Assessment: Progressed exercises this session as charted  Pt's SP02 remained above 90% entire session  Pt required minimal verbal cues for proper form and technique as well as sequencing  Tolerated treatment well  Patient demonstrated fatigue post treatment, exhibited good technique with therapeutic exercises and would benefit from continued PT      Plan: Progress treatment as tolerated  Precautions: Legally blind (macular degeneration), depression, anxiety, CHF (monitor SPO2), afib, peripheral neuropathy          Exercise Diary  2-3 (IE) - 2-20        NuStep (arms and legs) Monitor SPO2 6 mins, L3 NV Sp02 at rest 93%    6 mins, L3 SP02 at 93% L5 8 min  Spo2 at 92% L5 10 min  With UE  Spo2 at 91% L5 10 min  With UE                      Standing:             - front step up 10 x 4" step NV 2 x 10 ea 4" step 2x 10 4"  20x 4"  2 x 10 6" step        - lateral step up and over 10 x 4" step NV 10 x 4" step 2x 10 4"  20x 4" ea  20 x 6" step        - sit to stand (to adjustable mat to level he can transfer without use of arms, gradually lower table) 10 x NV 2 x 10  Table in use used UE and chair  2x 10  2 x 10 (70 deg knee flex)        - heel/toe raises 2 x 10 ea NV 2 x 10 ea 2x 10  3x 10          - hip abd  10 x ea 10x  20x ea          - hip ext  10 x ea 10x  20x ea            Marches              HS curls    2x 10  20x          TB:             - rows 2 x 10 RTB NV 2 x 10 RTB  20x RTB seated  standing 3 x 10 GTB        - pulldowns 2 x 10 RTB NV 2 x 10 RTB  20x RTB seated          - bicep curls 2 x 10 GTB NV 2  10 GTB  np          - tricep extensions 2 x 10 RTB NV 2 x 10 RTB  np                       - Lateral cone walks    NV         - FT, EO, foam    NV         - SLS (initially 2 finger support)    NV         Bicep curls    20x 2#  20x 2# seated  2 x 10 3#

## 2020-02-24 ENCOUNTER — OFFICE VISIT (OUTPATIENT)
Dept: PHYSICAL THERAPY | Facility: CLINIC | Age: 85
End: 2020-02-24
Payer: COMMERCIAL

## 2020-02-24 DIAGNOSIS — R53.1 WEAKNESS GENERALIZED: Primary | ICD-10-CM

## 2020-02-24 PROCEDURE — 97110 THERAPEUTIC EXERCISES: CPT | Performed by: PHYSICAL THERAPIST

## 2020-02-24 PROCEDURE — 97530 THERAPEUTIC ACTIVITIES: CPT | Performed by: PHYSICAL THERAPIST

## 2020-02-24 NOTE — PROGRESS NOTES
Daily Note     Today's date: 2020  Patient name: Padmini Cordova  : 1935  MRN: [id number]  Referring provider: Hernesto Campbell DO  Dx:   Encounter Diagnosis     ICD-10-CM    1  Weakness generalized R53 1                   Subjective: Patient denies any increase in pain or soreness following previous session  Objective: See treatment diary below      Assessment: Progressed exercises this session to include increased sets of functional strengthening exercises  Tolerated treatment well  Patient demonstrated fatigue post treatment and would benefit from continued PT  SPO2 monitored throughout session and remained above 90%  Plan: Progress treatment as tolerated  Precautions: Legally blind (macular degeneration), depression, anxiety, CHF (monitor SPO2), afib, peripheral neuropathy          Exercise Diary  2-3 (IE) 2- 2- 2-       NuStep (arms and legs) Monitor SPO2 6 mins, L3 NV Sp02 at rest 93%    6 mins, L3 SP02 at 93% L5 8 min  Spo2 at 92% L5 10 min  With UE  Spo2 at 91% L5 10 min  With UE   L5 8 min  With UE                     Standing:             - front step up 10 x 4" step NV 2 x 10 ea 4" step 2x 10 4"  20x 4"  2 x 10 6" step 3 x 10 ea 6 step       - lateral step up and over 10 x 4" step NV 10 x 4" step 2x 10 4"  20x 4" ea  20 x 6" step 20 x 6" step       - sit to stand (to adjustable mat to level he can transfer without use of arms, gradually lower table) 10 x NV 2 x 10  Table in use used UE and chair  2x 10  2 x 10 (70 deg knee flex) 2 x 10 (70 deg knee flex)       - heel/toe raises 2 x 10 ea NV 2 x 10 ea 2x 10  3x 10          - hip abd  10 x ea 10x  20x ea          - hip ext  10 x ea 10x  20x ea            Marches              HS curls    2x 10  20x          TB:             - rows 2 x 10 RTB NV 2 x 10 RTB  20x RTB seated  standing 3 x 10 GTB standing 3 x 10 GTB       - pulldowns 2 x 10 RTB NV 2 x 10 RTB  20x RTB seated   standing 3 x 10 GTB       - bicep curls 2 x 10 GTB NV 2  10 GTB  np          - tricep extensions 2 x 10 RTB NV 2 x 10 RTB  np                       - Lateral cone walks    NV         - FT, EO, foam    NV         - SLS (initially 2 finger support)    NV         Bicep curls    20x 2#  20x 2# seated  2 x 10 3#

## 2020-02-27 ENCOUNTER — OFFICE VISIT (OUTPATIENT)
Dept: PHYSICAL THERAPY | Facility: CLINIC | Age: 85
End: 2020-02-27
Payer: COMMERCIAL

## 2020-02-27 DIAGNOSIS — R53.1 WEAKNESS GENERALIZED: Primary | ICD-10-CM

## 2020-02-27 PROCEDURE — 97110 THERAPEUTIC EXERCISES: CPT

## 2020-02-27 PROCEDURE — 97530 THERAPEUTIC ACTIVITIES: CPT

## 2020-02-27 NOTE — PROGRESS NOTES
Daily Note     Today's date: 2020  Patient name: Kim Santos  : 1935  MRN: [id number]  Referring provider: Arabella Smith DO  Dx:   Encounter Diagnosis     ICD-10-CM    1  Weakness generalized R53 1        Start Time: 1155  Stop Time: 1245  Total time in clinic (min): 50 minutes    Subjective: PRE: patient reported feeling okay today  Pt reported he was up thinking a lot last night due to feeling a little more depressed today  POST: patient reported feeling less negative and less depressed and feeling more positive  Objective: See treatment diary below    PRE: 94SPO2, HR 74     Assessment:  Continued with treatment session only performed what patient was able to tolerate today, required increase breaks due to SOB and fatigue  All SPO2 vitals were within normal ranges  Tolerated treatment well  Patient demonstrated fatigue post treatment, exhibited good technique with therapeutic exercises and would benefit from continued PT  Plan: Continue per plan of care  Precautions: Legally blind (macular degeneration), depression, anxiety, CHF (monitor SPO2), afib, peripheral neuropathy          Exercise Diary  2-3 (IE) - 2--      NuStep (arms and legs) Monitor SPO2 6 mins, L3 NV Sp02 at rest 93%    6 mins, L3 SP02 at 93% L5 8 min  Spo2 at 92% L5 10 min  With UE  Spo2 at 91% L5 10 min  With UE   L5 8 min  With UE  10 min                    Standing:             - front step up 10 x 4" step NV 2 x 10 ea 4" step 2x 10 4"  20x 4"  2 x 10 6" step 3 x 10 ea 6 step 3x 10 6" ea  - lateral step up and over 10 x 4" step NV 10 x 4" step 2x 10 4"  20x 4" ea  20 x 6" step 20 x 6" step 20x 6"       - sit to stand (to adjustable mat to level he can transfer without use of arms, gradually lower table) 10 x NV 2 x 10  Table in use used UE and chair   2x 10  2 x 10 (70 deg knee flex) 2 x 10 (70 deg knee flex) np       - heel/toe raises 2 x 10 ea NV 2 x 10 ea 2x 10  3x 10    3x 10       - hip abd  10 x ea 10x  20x ea    3x 10       - hip ext  10 x ea 10x  20x ea      3x 10       Marches              HS curls    2x 10  20x          TB:             - rows 2 x 10 RTB NV 2 x 10 RTB  20x RTB seated  standing 3 x 10 GTB standing 3 x 10 GTB       - pulldowns 2 x 10 RTB NV 2 x 10 RTB  20x RTB seated   standing 3 x 10 GTB       - bicep curls 2 x 10 GTB NV 2  10 GTB  np          - tricep extensions 2 x 10 RTB NV 2 x 10 RTB  np                       - Lateral cone walks    NV         - FT, EO, foam    NV         - SLS (initially 2 finger support)    NV         Bicep curls    20x 2#  20x 2# seated  2 x 10 3#  2x 10 3#

## 2020-03-02 ENCOUNTER — APPOINTMENT (OUTPATIENT)
Dept: PHYSICAL THERAPY | Facility: CLINIC | Age: 85
End: 2020-03-02
Payer: COMMERCIAL

## 2020-03-04 ENCOUNTER — EVALUATION (OUTPATIENT)
Dept: PHYSICAL THERAPY | Facility: CLINIC | Age: 85
End: 2020-03-04
Payer: COMMERCIAL

## 2020-03-04 DIAGNOSIS — R53.1 WEAKNESS GENERALIZED: Primary | ICD-10-CM

## 2020-03-04 PROCEDURE — 97110 THERAPEUTIC EXERCISES: CPT | Performed by: PHYSICAL THERAPIST

## 2020-03-04 NOTE — PROGRESS NOTES
PT Re-Evaluation  and PT Discharge    Today's date: 3/4/2020  Patient name: Niki Wall  : 1935  MRN: [id number]  Referring provider: Princess Rosales DO  Dx:   Encounter Diagnosis     ICD-10-CM    1  Weakness generalized R53 1                   Assessment  Assessment details: Niki Wall is a pleasant 80 y o  presenting to physical therapy with MD referral for weakness (upper and lower extremity)  Since time of initial evaluation, pt has not made any significant improvements in upper or lower extremity strength or improvements in functional balance  Pt is not motivated to exercise and declined performing any exercises at home  At this point in time, pt will be discharged to an independent HEP  Pt states he has a poor quality of life  Encouraged pt to speak with his family members about how he feels and/or reach out to a psychologist  Spoke with pt's caregiver at conclusion of session and provided her written HEP and educated on how to utilize theraband in a door frame to anchor  Symptom irritability: moderateBarriers to therapy: Legally blind (macular degeneration), depression, anxiety, CHF, afib, peripheral neuropathy  Understanding of Dx/Px/POC: good   Prognosis: fair    Goals  ST  Pt will be able to perform sit to stand transfer with use of BUEs in no more than 2 attempts in 3 weeks  NOT MET  2  Pt will improve biceps strength to at least 4+/5 in 3 weeks  MET    LT  Pt will be able to negotiate step in/out of his home with no more than mild difficulty in 6 weeks  NOT MET  2  Pt will be independent in a comprehensive HEP in 6 weeks  NOT MET    Plan  Plan details: Discharge to an independent HEP   Discussed with patient [address] program   Patient would benefit from: skilled physical therapy  Treatment plan discussed with: patient and caregiver        Subjective Evaluation    History of Present Illness  Mechanism of injury: Pt reports he feels his weakness comes and goes when the weather comes and goes  Pt reports he has experienced two falls within the past year without injury (misstep into a divot in the driveway and fall off chair lift recliner when leaning forward)  Pt feels as though his vision is his largest problem at this time  Pt also reports tenderness in his feet which he feels limits his ability to walk for prolonged times  Pt reports his main goal with therapy is to improve his upper and lower extremity strength as well as balance  Current status:  - ADLs/Functional activities:   - One step to enter down into home with one step up to get into home with use of B canes with moderate difficulty (no change)   - Sit to stand with severe difficulty (no change)   - Walking < 5 minutes with B SC or RW prior to feelings of SOB and B leg fatigue (no change)   - Standing to cook dinner with moderate fatigue (no change)  - Work: Not a working individual  - Recreation: none    Since time of initial evaluation, pt reports no significant change in strength or endurance since initiating physical therapy  Pt states he has not been performing any exercises at home because he does not feel it is necessary  At this point in time, pt will be discharged to an independent [facility]  Pain  No pain reported  Progression: worsening    Social Support  Steps to enter house: yes  Lives with: adult children    Treatments  Treatments tried: none  Current treatment: physical therapy  Patient Goals  Patient goals for therapy: improved balance, increased strength and independence with ADLs/IADLs          Objective     Active Range of Motion     Additional Active Range of Motion Details  Upper Quarter screen: B shoulder abduction to approximately 80 degrees, B flexion approximately 120 degrees      Strength/Myotome Testing     Left Shoulder     Planes of Motion   Flexion: 4-   Abduction: 3     Isolated Muscles   Biceps: 4+   Latissimus: 3+   Triceps: 4     Right Shoulder     Planes of Motion   Flexion: 4- Abduction: 4-     Isolated Muscles   Biceps: 4+   Triceps: 4+     Left Hip   Planes of Motion   Flexion: 4+  External rotation: 4    Right Hip   Planes of Motion   Flexion: 4+  External rotation: 4+    Left Knee   Flexion: 4  Extension: 4+    Right Knee   Flexion: 4  Extension: 4+    Left Ankle/Foot   Dorsiflexion: 5  Plantar flexion: 5    Right Ankle/Foot   Dorsiflexion: 5  Plantar flexion: 5    Additional Strength Details  SLS L: 2 seconds  SLS R: 1 seconds    Feet together, eyes open, firm surface 30 seconds mild sway  Feet together, eyes closed, firm surface: 22 seconds moderate sway (worse)    Sit to stand: multiple attempts (5 attempts) with use of BUE support     Gait: pt ambulates over level surface at a slow pace with use of B SC with a flexed trunk  Precautions: Legally blind (macular degeneration), depression, anxiety, CHF (monitor SPO2), afib, peripheral neuropathy              Exercise Diary  2-3 (IE) 2-11 2/13 2/17 2-20 2-24 2/27  3-4 (RE)       NuStep (arms and legs) Monitor SPO2 6 mins, L3 NV Sp02 at rest 93%     6 mins, L3 SP02 at 93% L5 8 min  Spo2 at 92% L5 10 min  With UE  Spo2 at 91% L5 10 min  With UE   L5 8 min  With UE  10 min   L5 8 5 mph                               Standing:                       - front step up 10 x 4" step NV 2 x 10 ea 4" step 2x 10 4"  20x 4"  2 x 10 6" step 3 x 10 ea 6 step 3x 10 6" ea  Pt declined       - lateral step up and over 10 x 4" step NV 10 x 4" step 2x 10 4"  20x 4" ea  20 x 6" step 20 x 6" step 20x 6"   Pt declined       - sit to stand (to adjustable mat to level he can transfer without use of arms, gradually lower table) 10 x NV 2 x 10   Table in use used UE and chair   2x 10  2 x 10 (70 deg knee flex) 2 x 10 (70 deg knee flex) np   Pt declined       - heel/toe raises 2 x 10 ea NV 2 x 10 ea 2x 10  3x 10      3x 10   Pt declined       - hip abd   10 x ea 10x  20x ea      3x 10   Pt declined       - hip ext   10 x ea 10x  20x ea       3x 10  Pt declined       Marches                        HS curls      2x 10  20x                TB:                       - rows 2 x 10 RTB NV 2 x 10 RTB   20x RTB seated  standing 3 x 10 GTB standing 3 x 10 GTB    Pt declined       - pulldowns 2 x 10 RTB NV 2 x 10 RTB   20x RTB seated    standing 3 x 10 GTB    Pt declined       - bicep curls 2 x 10 GTB NV 2  10 GTB   np                - tricep extensions 2 x 10 RTB NV 2 x 10 RTB   np                                        - Lateral cone walks       NV               - FT, EO, foam       NV               - SLS (initially 2 finger support)       NV               Bicep curls      20x 2#  20x 2# seated  2 x 10 3#   2x 10 3#  Pt declined                                                         Pt declined performing any additional exercises this date other than NuStep and re-evaluation  Access Code: [email or web address]   URL: [email or web address]   Date: 03/04/2020   Prepared by: Carly Null      Exercises  · Standing Hip Abduction with Counter Support - 10 reps - 3 sets - 4x weekly  · Standing Hip Extension with Counter Support - 10 reps - 3 sets - 4x weekly  · Heel rises with counter support - 10 reps - 3 sets - 4x weekly  · Standing Row with Anchored Resistance - 10 reps - 3 sets - 4x weekly  · Shoulder extension with resistance - Neutral - 10 reps - 3 sets - 4x weekly  · Mini Squat with Counter Support - 10 reps - 3 sets - 4x weekly

## 2020-05-26 PROBLEM — R33.9 URINARY RETENTION: Status: RESOLVED | Noted: 2018-06-15 | Resolved: 2020-05-26

## 2020-10-10 ENCOUNTER — TELEPHONE (OUTPATIENT)
Dept: GASTROENTEROLOGY | Facility: AMBULARY SURGERY CENTER | Age: 85
End: 2020-10-10

## 2020-10-16 DIAGNOSIS — I48.91 ATRIAL FIBRILLATION, UNSPECIFIED TYPE (HCC): ICD-10-CM

## 2020-10-16 RX ORDER — DIGOXIN 125 MCG
TABLET ORAL
Qty: 90 TABLET | Refills: 3 | Status: SHIPPED | OUTPATIENT
Start: 2020-10-16

## 2021-02-12 DIAGNOSIS — Z23 ENCOUNTER FOR IMMUNIZATION: ICD-10-CM

## 2024-12-30 NOTE — ASSESSMENT & PLAN NOTE
· Possibly due to prostate enlargement verses constipation, both noted on CT scan  · Maintain [place] for now  · Added Flomax on admission  · Appreciate urology input no

## (undated) DEVICE — INVIEW CLEAR LEGGINGS: Brand: CONVERTORS

## (undated) DEVICE — STERILE SURGICAL LUBRICANT,  TUBE: Brand: SURGILUBE

## (undated) DEVICE — BIPOLAR ELECTROHEMOSTASIS CATHETER: Brand: GOLD PROBE

## (undated) DEVICE — GLOVE SRG BIOGEL 7

## (undated) DEVICE — BAG URINE DRAINAGE 2000ML ANTI RFLX LF

## (undated) DEVICE — WORKING LENGTH 235CM, WORKING CHANNEL 2.8MM: Brand: RESOLUTION 360 CLIP

## (undated) DEVICE — PACK TUR

## (undated) DEVICE — PREMIUM DRY TRAY LF: Brand: MEDLINE INDUSTRIES, INC.

## (undated) DEVICE — CHLORHEXIDINE 4PCT 4 OZ

## (undated) DEVICE — CATH FOLEY 20FR 5ML 2 WAY SILICONE ELASTIMER

## (undated) DEVICE — RESOLUTION CLIP 2.8MM X 235CM STR LF DISP

## (undated) DEVICE — UROCATCH BAG